# Patient Record
Sex: FEMALE | Race: WHITE | NOT HISPANIC OR LATINO
[De-identification: names, ages, dates, MRNs, and addresses within clinical notes are randomized per-mention and may not be internally consistent; named-entity substitution may affect disease eponyms.]

---

## 2017-12-07 ENCOUNTER — NEW PATIENT (OUTPATIENT)
Age: 77
End: 2017-12-07

## 2017-12-07 DIAGNOSIS — H35.363: ICD-10-CM

## 2017-12-07 DIAGNOSIS — H25.13: ICD-10-CM

## 2017-12-07 PROCEDURE — 99204 OFFICE O/P NEW MOD 45 MIN: CPT

## 2017-12-07 ASSESSMENT — TONOMETRY
OD_IOP_MMHG: 14
OS_IOP_MMHG: 15

## 2017-12-07 ASSESSMENT — KERATOMETRY
OD_K1POWER_DIOPTERS: 43.00
OS_K2POWER_DIOPTERS: 44.75
OD_AXISANGLE2_DEGREES: 173
OS_K1POWER_DIOPTERS: 42.00
OD_K2POWER_DIOPTERS: 45.00
OS_AXISANGLE_DEGREES: 76
OD_AXISANGLE_DEGREES: 83
OS_AXISANGLE2_DEGREES: 166

## 2017-12-07 ASSESSMENT — VISUAL ACUITY
OS_CC: 20/150+2
OD_CC: 20/60
OS_PH: 20/80-1

## 2017-12-22 ENCOUNTER — DIAGNOSTICS ONLY (OUTPATIENT)
Age: 77
End: 2017-12-22

## 2017-12-22 DIAGNOSIS — H25.13: ICD-10-CM

## 2017-12-22 PROCEDURE — 92014 COMPRE OPH EXAM EST PT 1/>: CPT

## 2017-12-22 PROCEDURE — 92136 OPHTHALMIC BIOMETRY: CPT

## 2017-12-22 ASSESSMENT — KERATOMETRY
OD_K2POWER_DIOPTERS: 45.00
OS_K1POWER_DIOPTERS: 42.25
OD_AXISANGLE_DEGREES: 80
OD_AXISANGLE2_DEGREES: 170
OD_K1POWER_DIOPTERS: 43.00
OS_AXISANGLE_DEGREES: 78
OS_K2POWER_DIOPTERS: 44.75
OS_AXISANGLE2_DEGREES: 168

## 2018-01-02 ASSESSMENT — KERATOMETRY
OS_AXISANGLE_DEGREES: 78
OD_K1POWER_DIOPTERS: 43.00
OS_AXISANGLE2_DEGREES: 168
OS_K1POWER_DIOPTERS: 42.25
OD_AXISANGLE_DEGREES: 80
OD_AXISANGLE2_DEGREES: 170
OS_K2POWER_DIOPTERS: 44.75
OD_K2POWER_DIOPTERS: 45.00

## 2018-01-03 ENCOUNTER — SURGERY/PROCEDURE (OUTPATIENT)
Age: 78
End: 2018-01-03

## 2018-01-03 DIAGNOSIS — H25.12: ICD-10-CM

## 2018-01-03 PROCEDURE — V2787 ASTIGMATISM-CORRECT FUNCTION: HCPCS

## 2018-01-03 PROCEDURE — 66984 XCAPSL CTRC RMVL W/O ECP: CPT

## 2018-01-04 ENCOUNTER — POST-OP CHECK (OUTPATIENT)
Age: 78
End: 2018-01-04

## 2018-01-04 DIAGNOSIS — Z96.1: ICD-10-CM

## 2018-01-04 DIAGNOSIS — H25.11: ICD-10-CM

## 2018-01-04 PROCEDURE — 99024 POSTOP FOLLOW-UP VISIT: CPT

## 2018-01-04 PROCEDURE — 92136 OPHTHALMIC BIOMETRY: CPT | Mod: 26,RT

## 2018-01-04 ASSESSMENT — KERATOMETRY
OS_K1POWER_DIOPTERS: 42.25
OD_AXISANGLE2_DEGREES: 170
OD_AXISANGLE_DEGREES: 80
OS_AXISANGLE2_DEGREES: 168
OD_K2POWER_DIOPTERS: 45.00
OS_AXISANGLE_DEGREES: 78
OD_K1POWER_DIOPTERS: 43.00
OS_K2POWER_DIOPTERS: 44.75

## 2018-01-04 ASSESSMENT — VISUAL ACUITY
OS_SC: 20/150-1
OS_SC: J5

## 2018-01-04 ASSESSMENT — TONOMETRY: OS_IOP_MMHG: 22

## 2018-01-12 ENCOUNTER — SURGERY/PROCEDURE (OUTPATIENT)
Age: 78
End: 2018-01-12

## 2018-01-12 ENCOUNTER — POST-OP CHECK (OUTPATIENT)
Age: 78
End: 2018-01-12

## 2018-01-12 DIAGNOSIS — H25.11: ICD-10-CM

## 2018-01-12 DIAGNOSIS — Z96.1: ICD-10-CM

## 2018-01-12 PROCEDURE — 99024 POSTOP FOLLOW-UP VISIT: CPT

## 2018-01-12 PROCEDURE — 66984 XCAPSL CTRC RMVL W/O ECP: CPT | Mod: 79,RT

## 2018-01-12 PROCEDURE — V2787 ASTIGMATISM-CORRECT FUNCTION: HCPCS

## 2018-01-12 ASSESSMENT — KERATOMETRY
OS_K2POWER_DIOPTERS: 44.75
OS_AXISANGLE_DEGREES: 78
OS_AXISANGLE2_DEGREES: 168
OS_K1POWER_DIOPTERS: 42.25
OD_K2POWER_DIOPTERS: 45.00
OD_K2POWER_DIOPTERS: 45.00
OS_K1POWER_DIOPTERS: 42.25
OD_AXISANGLE2_DEGREES: 170
OS_AXISANGLE_DEGREES: 78
OD_AXISANGLE_DEGREES: 80
OD_K1POWER_DIOPTERS: 43.00
OD_AXISANGLE2_DEGREES: 170
OS_K2POWER_DIOPTERS: 44.75
OD_AXISANGLE_DEGREES: 80
OD_K1POWER_DIOPTERS: 43.00
OS_AXISANGLE2_DEGREES: 168

## 2018-01-12 ASSESSMENT — VISUAL ACUITY
OD_SC: J2
OD_SC: 20/60+2
OS_SC: 20/80
OS_SC: J2

## 2018-01-12 ASSESSMENT — TONOMETRY
OD_IOP_MMHG: 40
OS_IOP_MMHG: 12

## 2018-01-26 ENCOUNTER — POST-OP CHECK (OUTPATIENT)
Age: 78
End: 2018-01-26

## 2018-01-26 DIAGNOSIS — Z96.1: ICD-10-CM

## 2018-01-26 PROCEDURE — 99024 POSTOP FOLLOW-UP VISIT: CPT

## 2018-01-26 ASSESSMENT — VISUAL ACUITY
OD_PH: 20/60
OD_SC: J10
OS_SC: 20/80
OD_SC: 20/100+2
OS_PH: 20/30
OS_SC: J2

## 2018-01-26 ASSESSMENT — KERATOMETRY
OS_AXISANGLE2_DEGREES: 160
OD_K1POWER_DIOPTERS: 42.75
OS_AXISANGLE_DEGREES: 70
OD_AXISANGLE_DEGREES: 81
OD_K2POWER_DIOPTERS: 45.25
OS_K1POWER_DIOPTERS: 42.50
OD_AXISANGLE2_DEGREES: 171
OS_K2POWER_DIOPTERS: 44.50

## 2018-01-26 ASSESSMENT — TONOMETRY
OD_IOP_MMHG: 17
OS_IOP_MMHG: 17

## 2018-02-08 ENCOUNTER — POST-OP CHECK (OUTPATIENT)
Age: 78
End: 2018-02-08

## 2018-02-08 DIAGNOSIS — H52.13: ICD-10-CM

## 2018-02-08 DIAGNOSIS — Z96.1: ICD-10-CM

## 2018-02-08 PROCEDURE — 92015 DETERMINE REFRACTIVE STATE: CPT

## 2018-02-08 PROCEDURE — 99024 POSTOP FOLLOW-UP VISIT: CPT

## 2018-02-08 ASSESSMENT — KERATOMETRY
OD_AXISANGLE2_DEGREES: 176
OS_AXISANGLE2_DEGREES: 156
OD_K1POWER_DIOPTERS: 43.00
OS_AXISANGLE_DEGREES: 66
OD_K2POWER_DIOPTERS: 45.00
OS_K1POWER_DIOPTERS: 43.00
OD_AXISANGLE_DEGREES: 86
OS_K2POWER_DIOPTERS: 44.25

## 2018-02-08 ASSESSMENT — VISUAL ACUITY
OD_PH: 20/40
OS_SC: 20/80+2
OS_SC: J1
OD_SC: J1
OD_SC: 20/70+1
OS_PH: 20/40+2

## 2018-02-08 ASSESSMENT — TONOMETRY
OS_IOP_MMHG: 18
OD_IOP_MMHG: 17

## 2018-08-09 ENCOUNTER — ESTABLISHED (OUTPATIENT)
Age: 78
End: 2018-08-09

## 2018-08-09 DIAGNOSIS — H35.363: ICD-10-CM

## 2018-08-09 DIAGNOSIS — Z96.1: ICD-10-CM

## 2018-08-09 PROCEDURE — 92250 FUNDUS PHOTOGRAPHY W/I&R: CPT

## 2018-08-09 PROCEDURE — 92014 COMPRE OPH EXAM EST PT 1/>: CPT

## 2018-08-09 ASSESSMENT — KERATOMETRY
OS_AXISANGLE_DEGREES: 66
OS_AXISANGLE2_DEGREES: 156
OS_K2POWER_DIOPTERS: 44.25
OD_K1POWER_DIOPTERS: 43.00
OD_K2POWER_DIOPTERS: 45.00
OD_AXISANGLE_DEGREES: 86
OD_AXISANGLE2_DEGREES: 176
OS_K1POWER_DIOPTERS: 43.00

## 2018-08-09 ASSESSMENT — VISUAL ACUITY
OD_CC: 20/30+1
OS_CC: 20/30-2

## 2018-08-09 ASSESSMENT — TONOMETRY
OD_IOP_MMHG: 13
OS_IOP_MMHG: 13

## 2019-09-06 ENCOUNTER — DILATED FUNDUS EXAM (OUTPATIENT)
Age: 79
End: 2019-09-06

## 2019-09-06 DIAGNOSIS — Z96.1: ICD-10-CM

## 2019-09-06 DIAGNOSIS — H35.363: ICD-10-CM

## 2019-09-06 DIAGNOSIS — H26.493: ICD-10-CM

## 2019-09-06 PROCEDURE — 92014 COMPRE OPH EXAM EST PT 1/>: CPT

## 2019-09-06 PROCEDURE — 92250 FUNDUS PHOTOGRAPHY W/I&R: CPT

## 2019-09-06 ASSESSMENT — TONOMETRY
OD_IOP_MMHG: 16
OS_IOP_MMHG: 15

## 2019-09-06 ASSESSMENT — VISUAL ACUITY
OS_CC: 20/30
OD_CC: 20/30-2

## 2020-10-20 ENCOUNTER — 1 YEAR COMPLETE EXAM (OUTPATIENT)
Age: 80
End: 2020-10-20

## 2020-10-20 DIAGNOSIS — H26.493: ICD-10-CM

## 2020-10-20 DIAGNOSIS — H35.363: ICD-10-CM

## 2020-10-20 DIAGNOSIS — H43.811: ICD-10-CM

## 2020-10-20 DIAGNOSIS — H04.123: ICD-10-CM

## 2020-10-20 DIAGNOSIS — H18.593: ICD-10-CM

## 2020-10-20 PROCEDURE — 92014 COMPRE OPH EXAM EST PT 1/>: CPT

## 2020-10-20 ASSESSMENT — TONOMETRY
OS_IOP_MMHG: 14
OD_IOP_MMHG: 16

## 2020-10-20 ASSESSMENT — VISUAL ACUITY
OS_CC: 20/40
OD_CC: 20/50+2
OU_CC: 20/30-2

## 2023-03-27 ENCOUNTER — NURSING HOME VISIT (OUTPATIENT)
Dept: POST ACUTE CARE | Facility: EXTERNAL LOCATION | Age: 83
End: 2023-03-27
Payer: COMMERCIAL

## 2023-03-27 DIAGNOSIS — I15.9 SECONDARY HYPERTENSION: ICD-10-CM

## 2023-03-27 DIAGNOSIS — E78.5 HYPERLIPIDEMIA, UNSPECIFIED HYPERLIPIDEMIA TYPE: ICD-10-CM

## 2023-03-27 DIAGNOSIS — M81.0 OSTEOPOROSIS, UNSPECIFIED OSTEOPOROSIS TYPE, UNSPECIFIED PATHOLOGICAL FRACTURE PRESENCE: ICD-10-CM

## 2023-03-27 DIAGNOSIS — G30.9 SEVERE ALZHEIMER'S DEMENTIA WITH MOOD DISTURBANCE, UNSPECIFIED TIMING OF DEMENTIA ONSET (MULTI): ICD-10-CM

## 2023-03-27 DIAGNOSIS — E83.42 HYPOMAGNESEMIA: ICD-10-CM

## 2023-03-27 DIAGNOSIS — F02.C3 SEVERE ALZHEIMER'S DEMENTIA WITH MOOD DISTURBANCE, UNSPECIFIED TIMING OF DEMENTIA ONSET (MULTI): ICD-10-CM

## 2023-03-27 DIAGNOSIS — G40.909 NONINTRACTABLE EPILEPSY WITHOUT STATUS EPILEPTICUS, UNSPECIFIED EPILEPSY TYPE (MULTI): ICD-10-CM

## 2023-03-27 PROCEDURE — 99309 SBSQ NF CARE MODERATE MDM 30: CPT | Performed by: INTERNAL MEDICINE

## 2023-03-27 NOTE — LETTER
Patient: Nay Frost  : 1940    Encounter Date: 2023    Name: Nay Frost  : 1940  MRN: [id number]  Visit Date: 3/27/2023  Chief Complaint: Monthly visit for management of chronic disease    HPI: 83 y/o CF who presented to [facility] due to acute confusion. Patient has a h/o alzheimer's dementia but her confusion was worse. Imaging was nondiagnostic. She was +ve for a UTI. She was treated with antibiotics. Patient improved and she was brought to [facility] for ongoing medical management and therapy services.    Subjective: Seen and examined today. Sitting up in chair in common area. Nonsensical speaking. Nursing reports no issues. I have reviewed nursing notes since my last visit and document any significant changes Reviewed orders, medications, Labs. Reviewed and updated Interval hx and meds reviewed. Reviewed chart looking at current medications, treatments, labs, x-rays etc.     ROS:  As above in subjective. Otherwise, all other systems have been reviewed and are negative for complaint.    Medications:  Medications reviewed and verified in NH chart.     Allergies:  Erythromycin, Pneumococcal Vaccines, Sulfa Antibiotics, Nuts, Bee Pollen, GRENADINE FLAVOR, IODINE, Shellfish, strawberry     Vital Signs: Reviewed in [facility]    Physical Exam:  CONSTITUTIONAL: elderly appearing, malnourished, nonsensical speaking, confused, able to follow simple commands, no distress  SKIN: Warm & Dry, no lesions, no rashes.  EYES: PERRL, EOMI, clear sclera  ENMT: Mucous membranes moist, no apparent injury, no lesions seen   HEAD/NECK: No lymphadenopathy, thyromegaly or JVD.  HEART: Regular rate & rhythm, no murmurs, 2+ equal pulses of the extremities, Normal S1 & S2.  LUNGS: Patent airways, diminished breath sounds with good chest expansion, thorax symmetric  ABDOMEN: Nondistended, soft, non-tender, +BS, no rebound tenderness or guarding.   EXTREMITIES: Normal extremities, no cyanosis, no  edema, no contusions, no clubbing  NEUROLOGIC: intact reflexes, confused     Results/Data:   Lab Results   Component Value Date    WBC 6.4 02/02/2022    HGB 12.8 02/02/2022    HCT 42.1 02/02/2022     (L) 02/02/2022    ALT 25 02/02/2022    AST 28 02/02/2022     (H) 02/02/2022    K 4.5 02/02/2022     (H) 02/02/2022    CREATININE 1.05 02/02/2022    BUN 33 (H) 02/02/2022    CO2 23 02/02/2022    INR 1.1 02/02/2022       Provider Impression:   Seizure like activity felt to be 2/2 UTI  - completed atb during hospitalization  - seizure precautions in place  - saw Neuro, Dr Meade on 7/1/2022 and did not rec medication at this time.   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 Irregular bradycardia noted (44-60bpm).  - nursing reports no seizure activity  - monitor closely, fall precautions    Alzheimer's Vascular Dementia  #mood disorder  - continue with vitamins  - c/w aricept  - consult psych as needed  - supportive care  - secure unit    HTN  - continue with metoprolol  - continue with baby ASA    Insomnia  - continue with trazodone    HLD  - continue with statin    Hypomag  - continue with supplementation    Osteoporosis  - c/w calcium+D    Constipation Prevention  - continue with stool softeners and laxatives PRN    Code Status  - DNRCCA    ----------------  Written by Penny Key RN acting as a scribe for Dr. Bernal. This note accurately reflects the work and decisions made by Dr. Bernal.     I, Dr. Bernal, attest all medical record entries made by the scribe were under my direction and were personally dictated by me. I have reviewed the chart and agree that the record accurately reflects my performance of the history, physical exam, and assessment and plan.       Electronically Signed By: Joesph Mckeon MD   5/10/23  1:32 PM

## 2023-05-04 ENCOUNTER — NURSING HOME VISIT (OUTPATIENT)
Dept: POST ACUTE CARE | Facility: EXTERNAL LOCATION | Age: 83
End: 2023-05-04
Payer: COMMERCIAL

## 2023-05-04 DIAGNOSIS — G30.9 SEVERE ALZHEIMER'S DEMENTIA WITH MOOD DISTURBANCE, UNSPECIFIED TIMING OF DEMENTIA ONSET (MULTI): ICD-10-CM

## 2023-05-04 DIAGNOSIS — I15.9 SECONDARY HYPERTENSION: ICD-10-CM

## 2023-05-04 DIAGNOSIS — F02.C3 SEVERE ALZHEIMER'S DEMENTIA WITH MOOD DISTURBANCE, UNSPECIFIED TIMING OF DEMENTIA ONSET (MULTI): ICD-10-CM

## 2023-05-04 DIAGNOSIS — E83.42 HYPOMAGNESEMIA: ICD-10-CM

## 2023-05-04 DIAGNOSIS — M81.0 OSTEOPOROSIS, UNSPECIFIED OSTEOPOROSIS TYPE, UNSPECIFIED PATHOLOGICAL FRACTURE PRESENCE: ICD-10-CM

## 2023-05-04 DIAGNOSIS — E78.5 HYPERLIPIDEMIA, UNSPECIFIED HYPERLIPIDEMIA TYPE: ICD-10-CM

## 2023-05-04 DIAGNOSIS — G40.909 NONINTRACTABLE EPILEPSY WITHOUT STATUS EPILEPTICUS, UNSPECIFIED EPILEPSY TYPE (MULTI): ICD-10-CM

## 2023-05-04 PROCEDURE — 99308 SBSQ NF CARE LOW MDM 20: CPT | Performed by: INTERNAL MEDICINE

## 2023-05-04 NOTE — LETTER
Patient: Nay Frost  : 1940    Encounter Date: 2023    Name: Nay Frost  : 1940  MRN: [id number]  Visit Date: 2023  Chief Complaint: Monthly visit for management of chronic disease    HPI: 81 y/o CF who presented to [facility] due to acute confusion. Patient has a h/o alzheimer's dementia but her confusion was worse. Imaging was nondiagnostic. She was +ve for a UTI. She was treated with antibiotics. Patient improved and she was brought to [facility] for ongoing medical management and therapy services.    Subjective: Seen and examined today. Sitting up in chair in common area. Nonsensical speaking. Nursing reports no issues. I have reviewed nursing notes since my last visit and document any significant changes Reviewed orders, medications, Labs. Reviewed and updated Interval hx and meds reviewed. Reviewed chart looking at current medications, treatments, labs, x-rays etc.     ROS:  As above in subjective. Otherwise, all other systems have been reviewed and are negative for complaint.    Medications:  Medications reviewed and verified in NH chart.     Allergies:  Erythromycin, Pneumococcal Vaccines, Sulfa Antibiotics, Nuts, Bee Pollen, GRENADINE FLAVOR, IODINE, Shellfish, strawberry     Vital Signs: Reviewed in [facility]    Physical Exam:  CONSTITUTIONAL: elderly appearing, malnourished, nonsensical speaking, confused, able to follow simple commands, no distress  SKIN: Warm & Dry, no lesions, no rashes.  EYES: PERRL, EOMI, clear sclera  ENMT: Mucous membranes moist, no apparent injury, no lesions seen   HEAD/NECK: No lymphadenopathy, thyromegaly or JVD.  HEART: Regular rate & rhythm, no murmurs, 2+ equal pulses of the extremities, Normal S1 & S2.  LUNGS: Patent airways, diminished breath sounds with good chest expansion, thorax symmetric  ABDOMEN: Nondistended, soft, non-tender, +BS, no rebound tenderness or guarding.   EXTREMITIES: Normal extremities, no cyanosis, no  edema, no contusions, no clubbing  NEUROLOGIC: intact reflexes, confused     Results/Data:   Lab Results   Component Value Date    WBC 6.4 02/02/2022    HGB 12.8 02/02/2022    HCT 42.1 02/02/2022     (L) 02/02/2022    ALT 25 02/02/2022    AST 28 02/02/2022     (H) 02/02/2022    K 4.5 02/02/2022     (H) 02/02/2022    CREATININE 1.05 02/02/2022    BUN 33 (H) 02/02/2022    CO2 23 02/02/2022    INR 1.1 02/02/2022       Provider Impression:   Seizure like activity felt to be 2/2 UTI  - completed atb during hospitalization  - seizure precautions in place  - saw Neuro, Dr Meade on 7/1/2022 and did not rec medication at this time.   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 Irregular bradycardia noted (44-60bpm).  - nursing reports no seizure activity  - monitor closely, fall precautions    Alzheimer's Vascular Dementia  #mood disorder  - continue with vitamins  - c/w aricept  - consult psych as needed  - supportive care  - secure unit    HTN  - continue with metoprolol  - continue with baby ASA    Insomnia  - continue with trazodone    HLD  - continue with statin    Hypomag  - continue with supplementation    Osteoporosis  - c/w calcium+D    Constipation Prevention  - continue with stool softeners and laxatives PRN    Code Status  - DNRCCA    ----------------  Written by Penny Key RN acting as a scribe for Dr. Bernal. This note accurately reflects the work and decisions made by Dr. Bernal.     I, Dr. Bernal, attest all medical record entries made by the scribe were under my direction and were personally dictated by me. I have reviewed the chart and agree that the record accurately reflects my performance of the history, physical exam, and assessment and plan.       Electronically Signed By: Joesph Mckeon MD   5/16/23  9:05 AM

## 2023-05-10 PROBLEM — G40.909 NONINTRACTABLE EPILEPSY WITHOUT STATUS EPILEPTICUS (MULTI): Status: ACTIVE | Noted: 2023-05-10

## 2023-05-10 PROBLEM — E83.42 HYPOMAGNESEMIA: Status: ACTIVE | Noted: 2023-05-10

## 2023-05-10 PROBLEM — E78.5 HYPERLIPIDEMIA: Status: ACTIVE | Noted: 2023-05-10

## 2023-05-10 PROBLEM — M81.0 OSTEOPOROSIS: Status: ACTIVE | Noted: 2023-05-10

## 2023-05-10 PROBLEM — I15.9 SECONDARY HYPERTENSION: Status: ACTIVE | Noted: 2023-05-10

## 2023-05-10 PROBLEM — F02.C3: Status: ACTIVE | Noted: 2023-05-10

## 2023-05-10 PROBLEM — G30.9: Status: ACTIVE | Noted: 2023-05-10

## 2023-05-10 NOTE — PROGRESS NOTES
Name: Nay Frost  : 1940  MRN: [id number]  Visit Date: 3/27/2023  Chief Complaint: Monthly visit for management of chronic disease    HPI: 83 y/o CF who presented to [facility] due to acute confusion. Patient has a h/o alzheimer's dementia but her confusion was worse. Imaging was nondiagnostic. She was +ve for a UTI. She was treated with antibiotics. Patient improved and she was brought to [facility] for ongoing medical management and therapy services.    Subjective: Seen and examined today. Sitting up in chair in common area. Nonsensical speaking. Nursing reports no issues. I have reviewed nursing notes since my last visit and document any significant changes Reviewed orders, medications, Labs. Reviewed and updated Interval hx and meds reviewed. Reviewed chart looking at current medications, treatments, labs, x-rays etc.     ROS:  As above in subjective. Otherwise, all other systems have been reviewed and are negative for complaint.    Medications:  Medications reviewed and verified in NH chart.     Allergies:  Erythromycin, Pneumococcal Vaccines, Sulfa Antibiotics, Nuts, Bee Pollen, GRENADINE FLAVOR, IODINE, Shellfish, strawberry     Vital Signs: Reviewed in [facility]    Physical Exam:  CONSTITUTIONAL: elderly appearing, malnourished, nonsensical speaking, confused, able to follow simple commands, no distress  SKIN: Warm & Dry, no lesions, no rashes.  EYES: PERRL, EOMI, clear sclera  ENMT: Mucous membranes moist, no apparent injury, no lesions seen   HEAD/NECK: No lymphadenopathy, thyromegaly or JVD.  HEART: Regular rate & rhythm, no murmurs, 2+ equal pulses of the extremities, Normal S1 & S2.  LUNGS: Patent airways, diminished breath sounds with good chest expansion, thorax symmetric  ABDOMEN: Nondistended, soft, non-tender, +BS, no rebound tenderness or guarding.   EXTREMITIES: Normal extremities, no cyanosis, no edema, no contusions, no clubbing  NEUROLOGIC: intact reflexes, confused      Results/Data:   Lab Results   Component Value Date    WBC 6.4 02/02/2022    HGB 12.8 02/02/2022    HCT 42.1 02/02/2022     (L) 02/02/2022    ALT 25 02/02/2022    AST 28 02/02/2022     (H) 02/02/2022    K 4.5 02/02/2022     (H) 02/02/2022    CREATININE 1.05 02/02/2022    BUN 33 (H) 02/02/2022    CO2 23 02/02/2022    INR 1.1 02/02/2022       Provider Impression:   Seizure like activity felt to be 2/2 UTI  - completed atb during hospitalization  - seizure precautions in place  - saw Neuro, Dr Meade on 7/1/2022 and did not rec medication at this time.   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 Irregular bradycardia noted (44-60bpm).  - nursing reports no seizure activity  - monitor closely, fall precautions    Alzheimer's Vascular Dementia  #mood disorder  - continue with vitamins  - c/w aricept  - consult psych as needed  - supportive care  - secure unit    HTN  - continue with metoprolol  - continue with baby ASA    Insomnia  - continue with trazodone    HLD  - continue with statin    Hypomag  - continue with supplementation    Osteoporosis  - c/w calcium+D    Constipation Prevention  - continue with stool softeners and laxatives PRN    Code Status  - DNRCCA    ----------------  Written by Penny Key RN acting as a scribe for Dr. Bernal. This note accurately reflects the work and decisions made by Dr. Bernal.     I, Dr. Bernal, attest all medical record entries made by the scribe were under my direction and were personally dictated by me. I have reviewed the chart and agree that the record accurately reflects my performance of the history, physical exam, and assessment and plan.

## 2023-05-15 NOTE — PROGRESS NOTES
Name: Nay Frost  : 1940  MRN: [id number]  Visit Date: 2023  Chief Complaint: Monthly visit for management of chronic disease    HPI: 83 y/o CF who presented to [facility] due to acute confusion. Patient has a h/o alzheimer's dementia but her confusion was worse. Imaging was nondiagnostic. She was +ve for a UTI. She was treated with antibiotics. Patient improved and she was brought to [facility] for ongoing medical management and therapy services.    Subjective: Seen and examined today. Sitting up in chair in common area. Nonsensical speaking. Nursing reports no issues. I have reviewed nursing notes since my last visit and document any significant changes Reviewed orders, medications, Labs. Reviewed and updated Interval hx and meds reviewed. Reviewed chart looking at current medications, treatments, labs, x-rays etc.     ROS:  As above in subjective. Otherwise, all other systems have been reviewed and are negative for complaint.    Medications:  Medications reviewed and verified in NH chart.     Allergies:  Erythromycin, Pneumococcal Vaccines, Sulfa Antibiotics, Nuts, Bee Pollen, GRENADINE FLAVOR, IODINE, Shellfish, strawberry     Vital Signs: Reviewed in [facility]    Physical Exam:  CONSTITUTIONAL: elderly appearing, malnourished, nonsensical speaking, confused, able to follow simple commands, no distress  SKIN: Warm & Dry, no lesions, no rashes.  EYES: PERRL, EOMI, clear sclera  ENMT: Mucous membranes moist, no apparent injury, no lesions seen   HEAD/NECK: No lymphadenopathy, thyromegaly or JVD.  HEART: Regular rate & rhythm, no murmurs, 2+ equal pulses of the extremities, Normal S1 & S2.  LUNGS: Patent airways, diminished breath sounds with good chest expansion, thorax symmetric  ABDOMEN: Nondistended, soft, non-tender, +BS, no rebound tenderness or guarding.   EXTREMITIES: Normal extremities, no cyanosis, no edema, no contusions, no clubbing  NEUROLOGIC: intact reflexes, confused      Results/Data:   Lab Results   Component Value Date    WBC 6.4 02/02/2022    HGB 12.8 02/02/2022    HCT 42.1 02/02/2022     (L) 02/02/2022    ALT 25 02/02/2022    AST 28 02/02/2022     (H) 02/02/2022    K 4.5 02/02/2022     (H) 02/02/2022    CREATININE 1.05 02/02/2022    BUN 33 (H) 02/02/2022    CO2 23 02/02/2022    INR 1.1 02/02/2022       Provider Impression:   Seizure like activity felt to be 2/2 UTI  - completed atb during hospitalization  - seizure precautions in place  - saw Neuro, Dr Meade on 7/1/2022 and did not rec medication at this time.   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 Irregular bradycardia noted (44-60bpm).  - nursing reports no seizure activity  - monitor closely, fall precautions    Alzheimer's Vascular Dementia  #mood disorder  - continue with vitamins  - c/w aricept  - consult psych as needed  - supportive care  - secure unit    HTN  - continue with metoprolol  - continue with baby ASA    Insomnia  - continue with trazodone    HLD  - continue with statin    Hypomag  - continue with supplementation    Osteoporosis  - c/w calcium+D    Constipation Prevention  - continue with stool softeners and laxatives PRN    Code Status  - DNRCCA    ----------------  Written by Penny Key RN acting as a scribe for Dr. Bernal. This note accurately reflects the work and decisions made by Dr. Bernal.     I, Dr. Bernal, attest all medical record entries made by the scribe were under my direction and were personally dictated by me. I have reviewed the chart and agree that the record accurately reflects my performance of the history, physical exam, and assessment and plan.

## 2023-07-15 ENCOUNTER — NURSING HOME VISIT (OUTPATIENT)
Dept: POST ACUTE CARE | Facility: EXTERNAL LOCATION | Age: 83
End: 2023-07-15
Payer: COMMERCIAL

## 2023-07-15 DIAGNOSIS — G30.9 SEVERE ALZHEIMER'S DEMENTIA WITH MOOD DISTURBANCE, UNSPECIFIED TIMING OF DEMENTIA ONSET (MULTI): ICD-10-CM

## 2023-07-15 DIAGNOSIS — I15.9 SECONDARY HYPERTENSION: ICD-10-CM

## 2023-07-15 DIAGNOSIS — E78.5 HYPERLIPIDEMIA, UNSPECIFIED HYPERLIPIDEMIA TYPE: ICD-10-CM

## 2023-07-15 DIAGNOSIS — F02.C3 SEVERE ALZHEIMER'S DEMENTIA WITH MOOD DISTURBANCE, UNSPECIFIED TIMING OF DEMENTIA ONSET (MULTI): ICD-10-CM

## 2023-07-15 DIAGNOSIS — M81.0 OSTEOPOROSIS, UNSPECIFIED OSTEOPOROSIS TYPE, UNSPECIFIED PATHOLOGICAL FRACTURE PRESENCE: ICD-10-CM

## 2023-07-15 DIAGNOSIS — G40.909 NONINTRACTABLE EPILEPSY WITHOUT STATUS EPILEPTICUS, UNSPECIFIED EPILEPSY TYPE (MULTI): ICD-10-CM

## 2023-07-15 DIAGNOSIS — E83.42 HYPOMAGNESEMIA: ICD-10-CM

## 2023-07-15 PROCEDURE — 99308 SBSQ NF CARE LOW MDM 20: CPT | Performed by: INTERNAL MEDICINE

## 2023-07-15 NOTE — LETTER
Patient: Nay Frost  : 1940    Encounter Date: 07/15/2023    Name: Nay Frost  : 1940  MRN: [id number]  Visit Date: 7/15/2023  Chief Complaint: Monthly visit for management of chronic disease    HPI: 84 y/o CF who presented to [facility] due to acute confusion. Patient has a h/o alzheimer's dementia but her confusion was worse. Imaging was nondiagnostic. She was +ve for a UTI. She was treated with antibiotics. Patient improved and she was brought to [facility] for ongoing medical management and therapy services.    Subjective: Seen and examined today. Sitting up in chair in common area. Nonsensical speaking. Nursing reports no issues. I have reviewed nursing notes since my last visit and document any significant changes Reviewed orders, medications, Labs. Reviewed and updated Interval hx and meds reviewed. Reviewed chart looking at current medications, treatments, labs, x-rays etc.     ROS:  As above in subjective. Otherwise, all other systems have been reviewed and are negative for complaint.    Medications:  Medications reviewed and verified in NH chart.     Allergies:  Erythromycin, Pneumococcal Vaccines, Sulfa Antibiotics, Nuts, Bee Pollen, GRENADINE FLAVOR, IODINE, Shellfish, strawberry     Vital Signs: Reviewed in [facility]    Physical Exam:  CONSTITUTIONAL: elderly appearing, malnourished, nonsensical speaking, confused, able to follow simple commands, no distress  SKIN: Warm & Dry, no lesions, no rashes.  EYES: PERRL, EOMI, clear sclera  ENMT: Mucous membranes moist, no apparent injury, no lesions seen   HEAD/NECK: No lymphadenopathy, thyromegaly or JVD.  HEART: Regular rate & rhythm, no murmurs, 2+ equal pulses of the extremities, Normal S1 & S2.  LUNGS: Patent airways, diminished breath sounds with good chest expansion, thorax symmetric  ABDOMEN: Nondistended, soft, non-tender, +BS, no rebound tenderness or guarding.   EXTREMITIES: Normal extremities, no cyanosis, no  edema, no contusions, no clubbing  NEUROLOGIC: intact reflexes, confused     Results/Data:   Lab Results   Component Value Date    WBC 6.4 02/02/2022    HGB 12.8 02/02/2022    HCT 42.1 02/02/2022     (L) 02/02/2022    ALT 25 02/02/2022    AST 28 02/02/2022     (H) 02/02/2022    K 4.5 02/02/2022     (H) 02/02/2022    CREATININE 1.05 02/02/2022    BUN 33 (H) 02/02/2022    CO2 23 02/02/2022    INR 1.1 02/02/2022       Provider Impression:   Seizure like activity felt to be 2/2 UTI  - completed atb during hospitalization  - seizure precautions in place  - saw Neuro, Dr Meade on 7/1/2022 and did not rec medication at this time.   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 Irregular bradycardia noted (44-60bpm).  - nursing reports no seizure activity  - monitor closely, fall precautions    Alzheimer's Vascular Dementia  #mood disorder  - continue with vitamins  - c/w aricept  - consult psych as needed  - supportive care  - secure unit    HTN  - continue with metoprolol  - continue with baby ASA    Insomnia  - continue with trazodone    HLD  - continue with statin    Hypomag  - continue with supplementation    Osteoporosis  - c/w calcium+D    Constipation Prevention  - continue with stool softeners and laxatives PRN    Code Status  - DNRCCA    ----------------  Written by Penny Key RN acting as a scribe for Dr. Bernal. This note accurately reflects the work and decisions made by Dr. Bernal.     I, Dr. Bernla, attest all medical record entries made by the scribe were under my direction and were personally dictated by me. I have reviewed the chart and agree that the record accurately reflects my performance of the history, physical exam, and assessment and plan.     Electronically Signed By: Joesph Mckeon MD   9/6/23  3:53 PM

## 2023-09-06 NOTE — PROGRESS NOTES
Name: Nay Frost  : 1940  MRN: [id number]  Visit Date: 7/15/2023  Chief Complaint: Monthly visit for management of chronic disease    HPI: 84 y/o CF who presented to [facility] due to acute confusion. Patient has a h/o alzheimer's dementia but her confusion was worse. Imaging was nondiagnostic. She was +ve for a UTI. She was treated with antibiotics. Patient improved and she was brought to [facility] for ongoing medical management and therapy services.    Subjective: Seen and examined today. Sitting up in chair in common area. Nonsensical speaking. Nursing reports no issues. I have reviewed nursing notes since my last visit and document any significant changes Reviewed orders, medications, Labs. Reviewed and updated Interval hx and meds reviewed. Reviewed chart looking at current medications, treatments, labs, x-rays etc.     ROS:  As above in subjective. Otherwise, all other systems have been reviewed and are negative for complaint.    Medications:  Medications reviewed and verified in NH chart.     Allergies:  Erythromycin, Pneumococcal Vaccines, Sulfa Antibiotics, Nuts, Bee Pollen, GRENADINE FLAVOR, IODINE, Shellfish, strawberry     Vital Signs: Reviewed in [facility]    Physical Exam:  CONSTITUTIONAL: elderly appearing, malnourished, nonsensical speaking, confused, able to follow simple commands, no distress  SKIN: Warm & Dry, no lesions, no rashes.  EYES: PERRL, EOMI, clear sclera  ENMT: Mucous membranes moist, no apparent injury, no lesions seen   HEAD/NECK: No lymphadenopathy, thyromegaly or JVD.  HEART: Regular rate & rhythm, no murmurs, 2+ equal pulses of the extremities, Normal S1 & S2.  LUNGS: Patent airways, diminished breath sounds with good chest expansion, thorax symmetric  ABDOMEN: Nondistended, soft, non-tender, +BS, no rebound tenderness or guarding.   EXTREMITIES: Normal extremities, no cyanosis, no edema, no contusions, no clubbing  NEUROLOGIC: intact reflexes, confused      Results/Data:   Lab Results   Component Value Date    WBC 6.4 02/02/2022    HGB 12.8 02/02/2022    HCT 42.1 02/02/2022     (L) 02/02/2022    ALT 25 02/02/2022    AST 28 02/02/2022     (H) 02/02/2022    K 4.5 02/02/2022     (H) 02/02/2022    CREATININE 1.05 02/02/2022    BUN 33 (H) 02/02/2022    CO2 23 02/02/2022    INR 1.1 02/02/2022       Provider Impression:   Seizure like activity felt to be 2/2 UTI  - completed atb during hospitalization  - seizure precautions in place  - saw Neuro, Dr Meade on 7/1/2022 and did not rec medication at this time.   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 Irregular bradycardia noted (44-60bpm).  - nursing reports no seizure activity  - monitor closely, fall precautions    Alzheimer's Vascular Dementia  #mood disorder  - continue with vitamins  - c/w aricept  - consult psych as needed  - supportive care  - secure unit    HTN  - continue with metoprolol  - continue with baby ASA    Insomnia  - continue with trazodone    HLD  - continue with statin    Hypomag  - continue with supplementation    Osteoporosis  - c/w calcium+D    Constipation Prevention  - continue with stool softeners and laxatives PRN    Code Status  - DNRCCA    ----------------  Written by Penny Key RN acting as a scribe for Dr. Bernal. This note accurately reflects the work and decisions made by Dr. Bernal.     I, Dr. Bernal, attest all medical record entries made by the scribe were under my direction and were personally dictated by me. I have reviewed the chart and agree that the record accurately reflects my performance of the history, physical exam, and assessment and plan.

## 2023-09-09 ENCOUNTER — NURSING HOME VISIT (OUTPATIENT)
Dept: POST ACUTE CARE | Facility: EXTERNAL LOCATION | Age: 83
End: 2023-09-09
Payer: COMMERCIAL

## 2023-09-09 DIAGNOSIS — E78.5 HYPERLIPIDEMIA, UNSPECIFIED HYPERLIPIDEMIA TYPE: ICD-10-CM

## 2023-09-09 DIAGNOSIS — E83.42 HYPOMAGNESEMIA: ICD-10-CM

## 2023-09-09 DIAGNOSIS — F02.C3 SEVERE ALZHEIMER'S DEMENTIA WITH MOOD DISTURBANCE, UNSPECIFIED TIMING OF DEMENTIA ONSET (MULTI): ICD-10-CM

## 2023-09-09 DIAGNOSIS — G30.9 SEVERE ALZHEIMER'S DEMENTIA WITH MOOD DISTURBANCE, UNSPECIFIED TIMING OF DEMENTIA ONSET (MULTI): ICD-10-CM

## 2023-09-09 DIAGNOSIS — M81.0 OSTEOPOROSIS, UNSPECIFIED OSTEOPOROSIS TYPE, UNSPECIFIED PATHOLOGICAL FRACTURE PRESENCE: ICD-10-CM

## 2023-09-09 DIAGNOSIS — I15.9 SECONDARY HYPERTENSION: ICD-10-CM

## 2023-09-09 DIAGNOSIS — G40.909 NONINTRACTABLE EPILEPSY WITHOUT STATUS EPILEPTICUS, UNSPECIFIED EPILEPSY TYPE (MULTI): ICD-10-CM

## 2023-09-09 PROCEDURE — 99308 SBSQ NF CARE LOW MDM 20: CPT | Performed by: INTERNAL MEDICINE

## 2023-09-09 NOTE — LETTER
Patient: Nay Frost  : 1940    Encounter Date: 2023    Name: Nay Frost  : 1940  MRN: [id number]  Visit Date: 2023  Chief Complaint: Monthly visit for management of chronic disease    HPI: 82 y/o CF who presented to [facility] due to acute confusion. Patient has a h/o alzheimer's dementia but her confusion was worse. Imaging was nondiagnostic. She was +ve for a UTI. She was treated with antibiotics. Patient improved and she was brought to [facility] for ongoing medical management and therapy services.    Subjective: Seen and examined today. Sitting up in chair in common area. Nonsensical speaking. Nursing reports no issues. I have reviewed nursing notes since my last visit and document any significant changes Reviewed orders, medications, Labs. Reviewed and updated Interval hx and meds reviewed. Reviewed chart looking at current medications, treatments, labs, x-rays etc.     ROS:  As above in subjective. Otherwise, all other systems have been reviewed and are negative for complaint.    Medications:  Medications reviewed and verified in NH chart.     Vital Signs: Reviewed in [facility]    Physical Exam:  CONSTITUTIONAL: elderly appearing, malnourished, nonsensical speaking, confused, able to follow simple commands, no distress  SKIN: Warm & Dry, no lesions, no rashes.  EYES: PERRL, EOMI, clear sclera  ENMT: Mucous membranes moist, no apparent injury, no lesions seen   HEAD/NECK: No lymphadenopathy, thyromegaly or JVD.  HEART: Regular rate & rhythm, no murmurs, 2+ equal pulses of the extremities, Normal S1 & S2.  LUNGS: Patent airways, diminished breath sounds with good chest expansion, thorax symmetric  ABDOMEN: Nondistended, soft, non-tender, +BS, no rebound tenderness or guarding.   EXTREMITIES: Normal extremities, no cyanosis, no edema, no contusions, no clubbing  NEUROLOGIC: intact reflexes, confused     Results/Data:   Lab Results   Component Value Date    WBC 7.9  12/07/2023    HGB 9.4 (L) 12/07/2023    HCT 30.5 (L) 12/07/2023    PLT 63 (L) 12/07/2023    ALT 26 12/04/2023    AST 28 12/04/2023     (H) 12/07/2023    K 4.0 12/07/2023     (H) 12/07/2023    CREATININE 1.01 12/07/2023    BUN 36 (H) 12/07/2023    CO2 23 12/07/2023    TSH 2.25 12/03/2023    INR 1.4 (H) 12/04/2023    HGBA1C 5.4 12/04/2023     Provider Impression:   Seizure like activity felt to be 2/2 UTI  - completed atb during hospitalization  - seizure precautions in place  - saw Neuro, Dr Meade on 7/1/2022 and did not rec medication at this time.   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 Irregular bradycardia noted (44-60bpm).  - nursing reports no seizure activity  - monitor closely, fall precautions    Alzheimer's Vascular Dementia  #mood disorder  - continue with vitamins  - c/w aricept  - consult psych as needed  - supportive care  - secure unit    HTN  - continue with metoprolol  - continue with baby ASA    Insomnia  - continue with trazodone    HLD  - continue with statin    Hypomag  - continue with supplementation    Osteoporosis  - c/w calcium+D    Constipation Prevention  - continue with stool softeners and laxatives PRN    Code Status  - DNRCCA    ----------------  Written by Penny Key RN acting as a scribe for Dr. Bernal. This note accurately reflects the work and decisions made by Dr. Bernal.     I, Dr. Bernal, attest all medical record entries made by the scribe were under my direction and were personally dictated by me. I have reviewed the chart and agree that the record accurately reflects my performance of the history, physical exam, and assessment and plan.     Electronically Signed By: Joesph Mckeon MD   1/5/24 12:59 PM

## 2023-10-09 ENCOUNTER — HOSPITAL ENCOUNTER (INPATIENT)
Facility: HOSPITAL | Age: 83
LOS: 3 days | Discharge: NURSING, REHAB OR LONG-TERM CARE | DRG: 522 | End: 2023-10-13
Attending: STUDENT IN AN ORGANIZED HEALTH CARE EDUCATION/TRAINING PROGRAM | Admitting: INTERNAL MEDICINE
Payer: COMMERCIAL

## 2023-10-09 ENCOUNTER — APPOINTMENT (OUTPATIENT)
Dept: RADIOLOGY | Facility: HOSPITAL | Age: 83
DRG: 522 | End: 2023-10-09
Payer: COMMERCIAL

## 2023-10-09 DIAGNOSIS — S72.001A CLOSED RIGHT HIP FRACTURE, INITIAL ENCOUNTER (MULTI): Primary | ICD-10-CM

## 2023-10-09 DIAGNOSIS — S72.91XA CLOSED FRACTURE OF RIGHT FEMUR, UNSPECIFIED FRACTURE MORPHOLOGY, UNSPECIFIED PORTION OF FEMUR, INITIAL ENCOUNTER (MULTI): ICD-10-CM

## 2023-10-09 DIAGNOSIS — T18.9XXA FOREIGN BODY IN DIGESTIVE TRACT, INITIAL ENCOUNTER: ICD-10-CM

## 2023-10-09 LAB
ALBUMIN SERPL BCP-MCNC: 3.9 G/DL (ref 3.4–5)
ALP SERPL-CCNC: 93 U/L (ref 33–136)
ALT SERPL W P-5'-P-CCNC: 27 U/L (ref 7–45)
ANION GAP SERPL CALC-SCNC: 13 MMOL/L (ref 10–20)
AST SERPL W P-5'-P-CCNC: 30 U/L (ref 9–39)
BASOPHILS # BLD AUTO: 0.02 X10*3/UL (ref 0–0.1)
BASOPHILS NFR BLD AUTO: 0.2 %
BILIRUB SERPL-MCNC: 0.9 MG/DL (ref 0–1.2)
BUN SERPL-MCNC: 42 MG/DL (ref 6–23)
CALCIUM SERPL-MCNC: 9.9 MG/DL (ref 8.6–10.3)
CHLORIDE SERPL-SCNC: 109 MMOL/L (ref 98–107)
CO2 SERPL-SCNC: 32 MMOL/L (ref 21–32)
CREAT SERPL-MCNC: 1.11 MG/DL (ref 0.5–1.05)
EOSINOPHIL # BLD AUTO: 0.01 X10*3/UL (ref 0–0.4)
EOSINOPHIL NFR BLD AUTO: 0.1 %
ERYTHROCYTE [DISTWIDTH] IN BLOOD BY AUTOMATED COUNT: 14.1 % (ref 11.5–14.5)
GFR SERPL CREATININE-BSD FRML MDRD: 49 ML/MIN/1.73M*2
GLUCOSE SERPL-MCNC: 122 MG/DL (ref 74–99)
HCT VFR BLD AUTO: 40.1 % (ref 36–46)
HGB BLD-MCNC: 12.6 G/DL (ref 12–16)
IMM GRANULOCYTES # BLD AUTO: 0.04 X10*3/UL (ref 0–0.5)
IMM GRANULOCYTES NFR BLD AUTO: 0.4 % (ref 0–0.9)
INR PPP: 1.2 (ref 0.9–1.1)
LYMPHOCYTES # BLD AUTO: 0.59 X10*3/UL (ref 0.8–3)
LYMPHOCYTES NFR BLD AUTO: 5.9 %
MCH RBC QN AUTO: 30.7 PG (ref 26–34)
MCHC RBC AUTO-ENTMCNC: 31.4 G/DL (ref 32–36)
MCV RBC AUTO: 98 FL (ref 80–100)
MONOCYTES # BLD AUTO: 0.36 X10*3/UL (ref 0.05–0.8)
MONOCYTES NFR BLD AUTO: 3.6 %
NEUTROPHILS # BLD AUTO: 8.93 X10*3/UL (ref 1.6–5.5)
NEUTROPHILS NFR BLD AUTO: 89.8 %
NRBC BLD-RTO: 0 /100 WBCS (ref 0–0)
PLATELET # BLD AUTO: 117 X10*3/UL (ref 150–450)
PMV BLD AUTO: 12.5 FL (ref 7.5–11.5)
POTASSIUM SERPL-SCNC: 4.1 MMOL/L (ref 3.5–5.3)
PROT SERPL-MCNC: 6.8 G/DL (ref 6.4–8.2)
PROTHROMBIN TIME: 13.4 SECONDS (ref 9.8–12.8)
RBC # BLD AUTO: 4.1 X10*6/UL (ref 4–5.2)
SODIUM SERPL-SCNC: 150 MMOL/L (ref 136–145)
WBC # BLD AUTO: 10 X10*3/UL (ref 4.4–11.3)

## 2023-10-09 PROCEDURE — 72170 X-RAY EXAM OF PELVIS: CPT | Mod: FR

## 2023-10-09 PROCEDURE — 86900 BLOOD TYPING SEROLOGIC ABO: CPT | Performed by: STUDENT IN AN ORGANIZED HEALTH CARE EDUCATION/TRAINING PROGRAM

## 2023-10-09 PROCEDURE — 72125 CT NECK SPINE W/O DYE: CPT | Performed by: RADIOLOGY

## 2023-10-09 PROCEDURE — 70450 CT HEAD/BRAIN W/O DYE: CPT | Performed by: RADIOLOGY

## 2023-10-09 PROCEDURE — 85025 COMPLETE CBC W/AUTO DIFF WBC: CPT | Performed by: STUDENT IN AN ORGANIZED HEALTH CARE EDUCATION/TRAINING PROGRAM

## 2023-10-09 PROCEDURE — 96360 HYDRATION IV INFUSION INIT: CPT

## 2023-10-09 PROCEDURE — 36415 COLL VENOUS BLD VENIPUNCTURE: CPT | Performed by: STUDENT IN AN ORGANIZED HEALTH CARE EDUCATION/TRAINING PROGRAM

## 2023-10-09 PROCEDURE — 72125 CT NECK SPINE W/O DYE: CPT | Mod: ME

## 2023-10-09 PROCEDURE — 72170 X-RAY EXAM OF PELVIS: CPT | Mod: FOREIGN READ | Performed by: RADIOLOGY

## 2023-10-09 PROCEDURE — 70450 CT HEAD/BRAIN W/O DYE: CPT | Mod: MG

## 2023-10-09 PROCEDURE — 80053 COMPREHEN METABOLIC PANEL: CPT | Performed by: STUDENT IN AN ORGANIZED HEALTH CARE EDUCATION/TRAINING PROGRAM

## 2023-10-09 PROCEDURE — 71045 X-RAY EXAM CHEST 1 VIEW: CPT | Mod: FY,FR

## 2023-10-09 PROCEDURE — 73560 X-RAY EXAM OF KNEE 1 OR 2: CPT | Mod: 50,FR

## 2023-10-09 PROCEDURE — 85610 PROTHROMBIN TIME: CPT | Performed by: STUDENT IN AN ORGANIZED HEALTH CARE EDUCATION/TRAINING PROGRAM

## 2023-10-09 PROCEDURE — 99285 EMERGENCY DEPT VISIT HI MDM: CPT | Performed by: STUDENT IN AN ORGANIZED HEALTH CARE EDUCATION/TRAINING PROGRAM

## 2023-10-09 PROCEDURE — 73560 X-RAY EXAM OF KNEE 1 OR 2: CPT | Mod: BILATERAL PROCEDURE | Performed by: RADIOLOGY

## 2023-10-09 PROCEDURE — 73552 X-RAY EXAM OF FEMUR 2/>: CPT | Mod: RT,FY,FR

## 2023-10-09 PROCEDURE — 73552 X-RAY EXAM OF FEMUR 2/>: CPT | Mod: RIGHT SIDE | Performed by: RADIOLOGY

## 2023-10-09 PROCEDURE — 71045 X-RAY EXAM CHEST 1 VIEW: CPT | Mod: FOREIGN READ | Performed by: RADIOLOGY

## 2023-10-09 ASSESSMENT — COLUMBIA-SUICIDE SEVERITY RATING SCALE - C-SSRS
1. IN THE PAST MONTH, HAVE YOU WISHED YOU WERE DEAD OR WISHED YOU COULD GO TO SLEEP AND NOT WAKE UP?: NO
1. IN THE PAST MONTH, HAVE YOU WISHED YOU WERE DEAD OR WISHED YOU COULD GO TO SLEEP AND NOT WAKE UP?: NO
6. HAVE YOU EVER DONE ANYTHING, STARTED TO DO ANYTHING, OR PREPARED TO DO ANYTHING TO END YOUR LIFE?: NO
2. HAVE YOU ACTUALLY HAD ANY THOUGHTS OF KILLING YOURSELF?: NO
6. HAVE YOU EVER DONE ANYTHING, STARTED TO DO ANYTHING, OR PREPARED TO DO ANYTHING TO END YOUR LIFE?: NO
2. HAVE YOU ACTUALLY HAD ANY THOUGHTS OF KILLING YOURSELF?: NO

## 2023-10-09 ASSESSMENT — LIFESTYLE VARIABLES
EVER HAD A DRINK FIRST THING IN THE MORNING TO STEADY YOUR NERVES TO GET RID OF A HANGOVER: NO
HAVE YOU EVER FELT YOU SHOULD CUT DOWN ON YOUR DRINKING: NO
HAVE PEOPLE ANNOYED YOU BY CRITICIZING YOUR DRINKING: NO
EVER FELT BAD OR GUILTY ABOUT YOUR DRINKING: NO

## 2023-10-09 ASSESSMENT — PAIN DESCRIPTION - DESCRIPTORS: DESCRIPTORS: THROBBING;ACHING

## 2023-10-09 ASSESSMENT — PAIN - FUNCTIONAL ASSESSMENT: PAIN_FUNCTIONAL_ASSESSMENT: FLACC (FACE, LEGS, ACTIVITY, CRY, CONSOLABILITY)

## 2023-10-09 NOTE — ED PROVIDER NOTES
HPI   Chief Complaint   Patient presents with    Fall     Unwitnessed fall c/o right hip pain. Pt Alert to self which is baseline    Knee Pain     PT presents to the ER with right knee pain. Pt states that her right knee has been sore for x2 weeks, today she felt a popping sensation and is unable to bare any weight on the knee or bend it without excruciating pain. PT states that over a year ago she fell on her knee and it hasn't been the same since. Msps are intact.        Is an 83-year-old female with significant PMH of dementia presents to the ED with cc of unwitnessed fall times today.  Patient is from nursing home and is endorsing pain in right lower extremity.  ROS is limited due to dementia.  Patient is denying any symptoms for me.  Patient is not on any blood thinners.  Patient normally uses a wheelchair and is assisted per nursing report.      History provided by:  Medical records and nursing home                      Grulla Coma Scale Score: 13                  Patient History   History reviewed. No pertinent past medical history.  History reviewed. No pertinent surgical history.  No family history on file.  Social History     Tobacco Use    Smoking status: Unknown    Smokeless tobacco: Not on file   Substance Use Topics    Alcohol use: Not on file    Drug use: Not on file       Physical Exam   ED Triage Vitals [10/09/23 1824]   Temp Heart Rate Resp BP   36.7 °C (98.1 °F) 64 18 146/74      SpO2 Temp src Heart Rate Source Patient Position   97 % -- -- --      BP Location FiO2 (%)     -- --       Physical Exam  HENT:      Head: Normocephalic.   Eyes:      Extraocular Movements: Extraocular movements intact.      Pupils: Pupils are equal, round, and reactive to light.   Cardiovascular:      Rate and Rhythm: Normal rate and regular rhythm.      Pulses: Normal pulses.      Heart sounds: Normal heart sounds.   Pulmonary:      Effort: Pulmonary effort is normal.      Breath sounds: Normal breath sounds.    Abdominal:      Palpations: Abdomen is soft.      Tenderness: There is no abdominal tenderness. There is no guarding.   Musculoskeletal:         General: Tenderness present. No deformity.      Cervical back: Normal range of motion. No tenderness.      Comments: Motion decreased in the right lower extremity with internal/external rotation of the right hip and flexion and extension of the right hip and right knee.  Pain on palpation to the right hip.  No pain on palpation to the right knee.  No pain on palpation elsewhere on exam.   Skin:     General: Skin is warm.   Neurological:      Mental Status: She is alert. Mental status is at baseline.         ED Course & MDM   Diagnoses as of 10/15/23 1141   Closed fracture of right femur, unspecified fracture morphology, unspecified portion of femur, initial encounter (CMS/[facility])       Medical Decision Making  Medical Decision Making:  Patient presented as described in HPI. Patient case including ROS, PE, and treatment and plan discussed with ED attending if attached as cosigner. Due to patients presentation orders completed include as documented.  Patient to the ER with cc of unwitnessed fall times today.  Patient is from nursing home and is ANO x0.  This is patient's baseline.  Patient has a history of dementia.  Patient was found to have fallen today and it was unwitnessed.  No known loss of consciousness no known blood thinners.  Patient normally uses a wheelchair.  Patient has pain on palpation to the right hip, nontoxic-appearing, no obvious focal deficits, decreased range of motion in the right hip with external/internal rotation and flexion and extension.  Patient has decreased range of motion of the right knee.  Patient has no pain on palpation to the right knee or elsewhere on the extremities full range of motion of the upper extremities and neck.  Patient has no mid spinous or paraspinous tenderness.  Pending imaging.  Patient's care will be continued by ER  attending and will be dispo once imaging returns.    This note has been transcribed using voice recognition and may contain grammatical errors, misplaced words, incorrect words, incorrect phrases or other errors.          Procedure  Procedures     Joan Clayton PA-C  10/09/23 1901       Joan Clayton PA-C  10/09/23 1953       Joan Clayton PA-C  10/15/23 1141

## 2023-10-10 ENCOUNTER — ANESTHESIA EVENT (OUTPATIENT)
Dept: OPERATING ROOM | Facility: HOSPITAL | Age: 83
DRG: 522 | End: 2023-10-10
Payer: COMMERCIAL

## 2023-10-10 ENCOUNTER — APPOINTMENT (OUTPATIENT)
Dept: RADIOLOGY | Facility: HOSPITAL | Age: 83
DRG: 522 | End: 2023-10-10
Payer: COMMERCIAL

## 2023-10-10 ENCOUNTER — ANESTHESIA (OUTPATIENT)
Dept: OPERATING ROOM | Facility: HOSPITAL | Age: 83
DRG: 522 | End: 2023-10-10
Payer: COMMERCIAL

## 2023-10-10 PROBLEM — T18.9XXA FOREIGN BODY IN ALIMENTARY TRACT: Status: ACTIVE | Noted: 2023-10-09

## 2023-10-10 LAB
ABO GROUP (TYPE) IN BLOOD: NORMAL
ABO GROUP (TYPE) IN BLOOD: NORMAL
ANION GAP SERPL CALC-SCNC: 14 MMOL/L (ref 10–20)
ANTIBODY SCREEN: NORMAL
BUN SERPL-MCNC: 38 MG/DL (ref 6–23)
CALCIUM SERPL-MCNC: 9.2 MG/DL (ref 8.6–10.3)
CHLORIDE SERPL-SCNC: 111 MMOL/L (ref 98–107)
CO2 SERPL-SCNC: 27 MMOL/L (ref 21–32)
CREAT SERPL-MCNC: 1 MG/DL (ref 0.5–1.05)
ERYTHROCYTE [DISTWIDTH] IN BLOOD BY AUTOMATED COUNT: 14 % (ref 11.5–14.5)
GFR SERPL CREATININE-BSD FRML MDRD: 55 ML/MIN/1.73M*2
GLUCOSE SERPL-MCNC: 128 MG/DL (ref 74–99)
HCT VFR BLD AUTO: 37.1 % (ref 36–46)
HGB BLD-MCNC: 11.6 G/DL (ref 12–16)
MCH RBC QN AUTO: 30.8 PG (ref 26–34)
MCHC RBC AUTO-ENTMCNC: 31.3 G/DL (ref 32–36)
MCV RBC AUTO: 98 FL (ref 80–100)
NRBC BLD-RTO: 0 /100 WBCS (ref 0–0)
PLATELET # BLD AUTO: 104 X10*3/UL (ref 150–450)
PMV BLD AUTO: 11.8 FL (ref 7.5–11.5)
POTASSIUM SERPL-SCNC: 3.9 MMOL/L (ref 3.5–5.3)
RBC # BLD AUTO: 3.77 X10*6/UL (ref 4–5.2)
RH FACTOR (ANTIGEN D): NORMAL
RH FACTOR (ANTIGEN D): NORMAL
SODIUM SERPL-SCNC: 148 MMOL/L (ref 136–145)
WBC # BLD AUTO: 8.4 X10*3/UL (ref 4.4–11.3)

## 2023-10-10 PROCEDURE — 1100000001 HC PRIVATE ROOM DAILY

## 2023-10-10 PROCEDURE — 36415 COLL VENOUS BLD VENIPUNCTURE: CPT | Performed by: INTERNAL MEDICINE

## 2023-10-10 PROCEDURE — 36415 COLL VENOUS BLD VENIPUNCTURE: CPT | Performed by: ORTHOPAEDIC SURGERY

## 2023-10-10 PROCEDURE — 80048 BASIC METABOLIC PNL TOTAL CA: CPT | Performed by: INTERNAL MEDICINE

## 2023-10-10 PROCEDURE — 99231 SBSQ HOSP IP/OBS SF/LOW 25: CPT | Performed by: NURSE PRACTITIONER

## 2023-10-10 PROCEDURE — 99223 1ST HOSP IP/OBS HIGH 75: CPT | Performed by: INTERNAL MEDICINE

## 2023-10-10 PROCEDURE — 99232 SBSQ HOSP IP/OBS MODERATE 35: CPT | Performed by: NURSE PRACTITIONER

## 2023-10-10 PROCEDURE — 27230 TREAT THIGH FRACTURE: CPT | Performed by: NURSE PRACTITIONER

## 2023-10-10 PROCEDURE — 74019 RADEX ABDOMEN 2 VIEWS: CPT | Performed by: RADIOLOGY

## 2023-10-10 PROCEDURE — 85027 COMPLETE CBC AUTOMATED: CPT | Performed by: INTERNAL MEDICINE

## 2023-10-10 PROCEDURE — 74019 RADEX ABDOMEN 2 VIEWS: CPT

## 2023-10-10 PROCEDURE — 2500000001 HC RX 250 WO HCPCS SELF ADMINISTERED DRUGS (ALT 637 FOR MEDICARE OP): Performed by: INTERNAL MEDICINE

## 2023-10-10 PROCEDURE — 2580000001 HC RX 258 IV SOLUTIONS: Performed by: STUDENT IN AN ORGANIZED HEALTH CARE EDUCATION/TRAINING PROGRAM

## 2023-10-10 PROCEDURE — 2500000004 HC RX 250 GENERAL PHARMACY W/ HCPCS (ALT 636 FOR OP/ED): Performed by: INTERNAL MEDICINE

## 2023-10-10 PROCEDURE — 99221 1ST HOSP IP/OBS SF/LOW 40: CPT | Performed by: NURSE PRACTITIONER

## 2023-10-10 RX ORDER — MAGNESIUM 250 MG
250 TABLET ORAL DAILY
COMMUNITY
End: 2023-12-09 | Stop reason: HOSPADM

## 2023-10-10 RX ORDER — GUAIFENESIN 600 MG/1
600 TABLET, EXTENDED RELEASE ORAL EVERY 12 HOURS PRN
Status: DISCONTINUED | OUTPATIENT
Start: 2023-10-10 | End: 2023-10-13 | Stop reason: HOSPADM

## 2023-10-10 RX ORDER — SENNOSIDES 8.6 MG/1
1 TABLET ORAL DAILY
COMMUNITY
End: 2023-12-09 | Stop reason: HOSPADM

## 2023-10-10 RX ORDER — ONDANSETRON HYDROCHLORIDE 2 MG/ML
4 INJECTION, SOLUTION INTRAVENOUS EVERY 8 HOURS PRN
Status: DISCONTINUED | OUTPATIENT
Start: 2023-10-10 | End: 2023-10-13 | Stop reason: HOSPADM

## 2023-10-10 RX ORDER — ACETAMINOPHEN 160 MG/5ML
650 SOLUTION ORAL EVERY 4 HOURS PRN
Status: DISCONTINUED | OUTPATIENT
Start: 2023-10-10 | End: 2023-10-13 | Stop reason: HOSPADM

## 2023-10-10 RX ORDER — ACETAMINOPHEN 325 MG/1
650 TABLET ORAL EVERY 4 HOURS PRN
Status: DISCONTINUED | OUTPATIENT
Start: 2023-10-10 | End: 2023-10-13 | Stop reason: HOSPADM

## 2023-10-10 RX ORDER — TRAZODONE HYDROCHLORIDE 50 MG/1
25 TABLET ORAL NIGHTLY
Status: DISCONTINUED | OUTPATIENT
Start: 2023-10-10 | End: 2023-10-13 | Stop reason: HOSPADM

## 2023-10-10 RX ORDER — LANOLIN ALCOHOL/MO/W.PET/CERES
100 CREAM (GRAM) TOPICAL DAILY
COMMUNITY
End: 2023-12-09 | Stop reason: HOSPADM

## 2023-10-10 RX ORDER — DOCUSATE SODIUM 100 MG/1
100 CAPSULE, LIQUID FILLED ORAL 2 TIMES DAILY
Status: DISCONTINUED | OUTPATIENT
Start: 2023-10-10 | End: 2023-10-13 | Stop reason: HOSPADM

## 2023-10-10 RX ORDER — ATORVASTATIN CALCIUM 40 MG/1
40 TABLET, FILM COATED ORAL DAILY
COMMUNITY
End: 2023-12-09 | Stop reason: HOSPADM

## 2023-10-10 RX ORDER — ACETAMINOPHEN 10 MG/ML
1000 INJECTION, SOLUTION INTRAVENOUS EVERY 6 HOURS SCHEDULED
Status: DISCONTINUED | OUTPATIENT
Start: 2023-10-10 | End: 2023-10-10

## 2023-10-10 RX ORDER — GUAIFENESIN/DEXTROMETHORPHAN 100-10MG/5
5 SYRUP ORAL EVERY 4 HOURS PRN
Status: DISCONTINUED | OUTPATIENT
Start: 2023-10-10 | End: 2023-10-13 | Stop reason: HOSPADM

## 2023-10-10 RX ORDER — LANOLIN ALCOHOL/MO/W.PET/CERES
100 CREAM (GRAM) TOPICAL DAILY
Status: DISCONTINUED | OUTPATIENT
Start: 2023-10-10 | End: 2023-10-13 | Stop reason: HOSPADM

## 2023-10-10 RX ORDER — MORPHINE SULFATE 2 MG/ML
1 INJECTION, SOLUTION INTRAMUSCULAR; INTRAVENOUS EVERY 4 HOURS PRN
Status: DISCONTINUED | OUTPATIENT
Start: 2023-10-10 | End: 2023-10-10

## 2023-10-10 RX ORDER — TRAZODONE HYDROCHLORIDE 50 MG/1
25 TABLET ORAL NIGHTLY
COMMUNITY
End: 2023-12-09 | Stop reason: HOSPADM

## 2023-10-10 RX ORDER — ASPIRIN 325 MG
100 TABLET, DELAYED RELEASE (ENTERIC COATED) ORAL DAILY
Status: ON HOLD | COMMUNITY
End: 2023-12-03 | Stop reason: ENTERED-IN-ERROR

## 2023-10-10 RX ORDER — CHOLECALCIFEROL (VITAMIN D3) 25 MCG
5000 TABLET ORAL DAILY
Status: DISCONTINUED | OUTPATIENT
Start: 2023-10-10 | End: 2023-10-13 | Stop reason: HOSPADM

## 2023-10-10 RX ORDER — ASPIRIN 81 MG/1
81 TABLET ORAL DAILY
Status: ON HOLD | COMMUNITY
End: 2023-10-13 | Stop reason: SDUPTHER

## 2023-10-10 RX ORDER — SODIUM CHLORIDE 450 MG/100ML
60 INJECTION, SOLUTION INTRAVENOUS CONTINUOUS
Status: DISCONTINUED | OUTPATIENT
Start: 2023-10-10 | End: 2023-10-13 | Stop reason: HOSPADM

## 2023-10-10 RX ORDER — ACETAMINOPHEN 650 MG/1
650 SUPPOSITORY RECTAL EVERY 4 HOURS PRN
Status: DISCONTINUED | OUTPATIENT
Start: 2023-10-10 | End: 2023-10-13 | Stop reason: HOSPADM

## 2023-10-10 RX ORDER — ONDANSETRON HYDROCHLORIDE 2 MG/ML
4 INJECTION, SOLUTION INTRAVENOUS EVERY 6 HOURS PRN
Status: DISCONTINUED | OUTPATIENT
Start: 2023-10-10 | End: 2023-10-13 | Stop reason: HOSPADM

## 2023-10-10 RX ORDER — FERROUS SULFATE, DRIED 160(50) MG
1 TABLET, EXTENDED RELEASE ORAL DAILY
Status: DISCONTINUED | OUTPATIENT
Start: 2023-10-10 | End: 2023-10-13 | Stop reason: HOSPADM

## 2023-10-10 RX ORDER — ACETAMINOPHEN 500 MG
5000 TABLET ORAL DAILY
COMMUNITY
End: 2023-12-09 | Stop reason: HOSPADM

## 2023-10-10 RX ORDER — MULTIVIT-MIN/IRON FUM/FOLIC AC 7.5 MG-4
1 TABLET ORAL DAILY
Status: DISCONTINUED | OUTPATIENT
Start: 2023-10-10 | End: 2023-10-13 | Stop reason: HOSPADM

## 2023-10-10 RX ORDER — DONEPEZIL HYDROCHLORIDE 5 MG/1
5 TABLET, FILM COATED ORAL NIGHTLY
COMMUNITY
End: 2023-12-09 | Stop reason: HOSPADM

## 2023-10-10 RX ORDER — MORPHINE SULFATE 2 MG/ML
2 INJECTION, SOLUTION INTRAMUSCULAR; INTRAVENOUS EVERY 4 HOURS PRN
Status: DISCONTINUED | OUTPATIENT
Start: 2023-10-10 | End: 2023-10-13 | Stop reason: HOSPADM

## 2023-10-10 RX ORDER — ADHESIVE BANDAGE
30 BANDAGE TOPICAL DAILY PRN
COMMUNITY
End: 2023-12-09 | Stop reason: HOSPADM

## 2023-10-10 RX ORDER — ALUMINUM HYDROXIDE, MAGNESIUM HYDROXIDE, AND SIMETHICONE 1200; 120; 1200 MG/30ML; MG/30ML; MG/30ML
30 SUSPENSION ORAL EVERY 6 HOURS PRN
Status: DISCONTINUED | OUTPATIENT
Start: 2023-10-10 | End: 2023-10-13 | Stop reason: HOSPADM

## 2023-10-10 RX ORDER — EPINEPHRINE 0.22MG
100 AEROSOL WITH ADAPTER (ML) INHALATION DAILY
Status: DISCONTINUED | OUTPATIENT
Start: 2023-10-10 | End: 2023-10-13 | Stop reason: HOSPADM

## 2023-10-10 RX ORDER — ATORVASTATIN CALCIUM 40 MG/1
40 TABLET, FILM COATED ORAL DAILY
Status: DISCONTINUED | OUTPATIENT
Start: 2023-10-10 | End: 2023-10-13 | Stop reason: HOSPADM

## 2023-10-10 RX ORDER — METOPROLOL TARTRATE 50 MG/1
25 TABLET ORAL DAILY
COMMUNITY
End: 2023-12-09 | Stop reason: HOSPADM

## 2023-10-10 RX ORDER — DONEPEZIL HYDROCHLORIDE 5 MG/1
5 TABLET, FILM COATED ORAL NIGHTLY
Status: DISCONTINUED | OUTPATIENT
Start: 2023-10-10 | End: 2023-10-13 | Stop reason: HOSPADM

## 2023-10-10 RX ORDER — ONDANSETRON 4 MG/1
4 TABLET, FILM COATED ORAL EVERY 8 HOURS PRN
Status: DISCONTINUED | OUTPATIENT
Start: 2023-10-10 | End: 2023-10-13 | Stop reason: HOSPADM

## 2023-10-10 RX ORDER — ASPIRIN 81 MG/1
81 TABLET ORAL DAILY
Status: DISCONTINUED | OUTPATIENT
Start: 2023-10-10 | End: 2023-10-13 | Stop reason: HOSPADM

## 2023-10-10 RX ORDER — CEFAZOLIN SODIUM 2 G/50ML
2 SOLUTION INTRAVENOUS ONCE
Status: CANCELLED | OUTPATIENT
Start: 2023-10-10

## 2023-10-10 RX ORDER — METOPROLOL TARTRATE 50 MG/1
50 TABLET ORAL DAILY
Status: DISCONTINUED | OUTPATIENT
Start: 2023-10-10 | End: 2023-10-13 | Stop reason: HOSPADM

## 2023-10-10 RX ADMIN — SODIUM CHLORIDE, SODIUM LACTATE, POTASSIUM CHLORIDE, AND CALCIUM CHLORIDE 1000 ML: 600; 310; 30; 20 INJECTION, SOLUTION INTRAVENOUS at 00:05

## 2023-10-10 RX ADMIN — SODIUM CHLORIDE 60 ML/HR: 4.5 INJECTION, SOLUTION INTRAVENOUS at 08:13

## 2023-10-10 RX ADMIN — TRAZODONE HYDROCHLORIDE 25 MG: 50 TABLET ORAL at 21:32

## 2023-10-10 RX ADMIN — DONEPEZIL HYDROCHLORIDE 5 MG: 5 TABLET ORAL at 21:32

## 2023-10-10 RX ADMIN — DOCUSATE SODIUM 100 MG: 100 CAPSULE, LIQUID FILLED ORAL at 21:32

## 2023-10-10 SDOH — SOCIAL STABILITY: SOCIAL INSECURITY: HAVE YOU HAD THOUGHTS OF HARMING ANYONE ELSE?: UNABLE TO ASSESS

## 2023-10-10 SDOH — SOCIAL STABILITY: SOCIAL INSECURITY: POSSIBLE ABUSE REPORTED TO:: OTHER (COMMENT)

## 2023-10-10 SDOH — SOCIAL STABILITY: SOCIAL INSECURITY: HAS ANYONE EVER THREATENED TO HURT YOUR FAMILY OR YOUR PETS?: UNABLE TO ASSESS

## 2023-10-10 SDOH — HEALTH STABILITY: MENTAL HEALTH: EXPERIENCED ANY OF THE FOLLOWING LIFE EVENTS: OTHER (COMMENT)

## 2023-10-10 SDOH — SOCIAL STABILITY: SOCIAL INSECURITY: ARE YOU OR HAVE YOU BEEN THREATENED OR ABUSED PHYSICALLY, EMOTIONALLY, OR SEXUALLY BY ANYONE?: UNABLE TO ASSESS

## 2023-10-10 SDOH — SOCIAL STABILITY: SOCIAL INSECURITY: ARE THERE ANY APPARENT SIGNS OF INJURIES/BEHAVIORS THAT COULD BE RELATED TO ABUSE/NEGLECT?: UNABLE TO ASSESS

## 2023-10-10 SDOH — SOCIAL STABILITY: SOCIAL INSECURITY: DO YOU FEEL UNSAFE GOING BACK TO THE PLACE WHERE YOU ARE LIVING?: UNABLE TO ASSESS

## 2023-10-10 SDOH — SOCIAL STABILITY: SOCIAL INSECURITY: ABUSE: ADULT

## 2023-10-10 SDOH — SOCIAL STABILITY: SOCIAL INSECURITY: DO YOU FEEL ANYONE HAS EXPLOITED OR TAKEN ADVANTAGE OF YOU FINANCIALLY OR OF YOUR PERSONAL PROPERTY?: UNABLE TO ASSESS

## 2023-10-10 SDOH — SOCIAL STABILITY: SOCIAL INSECURITY: DOES ANYONE TRY TO KEEP YOU FROM HAVING/CONTACTING OTHER FRIENDS OR DOING THINGS OUTSIDE YOUR HOME?: UNABLE TO ASSESS

## 2023-10-10 SDOH — SOCIAL STABILITY: SOCIAL INSECURITY: WERE YOU ABLE TO COMPLETE ALL THE BEHAVIORAL HEALTH SCREENINGS?: NO

## 2023-10-10 ASSESSMENT — ACTIVITIES OF DAILY LIVING (ADL)
HEARING - LEFT EAR: FUNCTIONAL
BATHING: NEEDS ASSISTANCE
DRESSING YOURSELF: NEEDS ASSISTANCE
HEARING - RIGHT EAR: FUNCTIONAL
LACK_OF_TRANSPORTATION: PATIENT DECLINED
FEEDING YOURSELF: NEEDS ASSISTANCE
ADEQUATE_TO_COMPLETE_ADL: UNABLE TO ASSESS
GROOMING: NEEDS ASSISTANCE
WALKS IN HOME: NEEDS ASSISTANCE
PATIENT'S MEMORY ADEQUATE TO SAFELY COMPLETE DAILY ACTIVITIES?: NO
ASSISTIVE_DEVICE: OTHER (COMMENT)
TOILETING: NEEDS ASSISTANCE
JUDGMENT_ADEQUATE_SAFELY_COMPLETE_DAILY_ACTIVITIES: NO

## 2023-10-10 ASSESSMENT — COGNITIVE AND FUNCTIONAL STATUS - GENERAL
MOBILITY SCORE: 6
CLIMB 3 TO 5 STEPS WITH RAILING: TOTAL
STANDING UP FROM CHAIR USING ARMS: A LOT
PERSONAL GROOMING: TOTAL
MOVING TO AND FROM BED TO CHAIR: TOTAL
TOILETING: TOTAL
TOILETING: A LOT
TURNING FROM BACK TO SIDE WHILE IN FLAT BAD: A LITTLE
DAILY ACTIVITIY SCORE: 13
DRESSING REGULAR LOWER BODY CLOTHING: TOTAL
MOBILITY SCORE: 6
WALKING IN HOSPITAL ROOM: TOTAL
CLIMB 3 TO 5 STEPS WITH RAILING: TOTAL
DAILY ACTIVITIY SCORE: 8
MOVING TO AND FROM BED TO CHAIR: A LOT
PATIENT BASELINE BEDBOUND: NO
MOBILITY SCORE: 12
DRESSING REGULAR UPPER BODY CLOTHING: TOTAL
HELP NEEDED FOR BATHING: TOTAL
DAILY ACTIVITIY SCORE: 6
MOVING FROM LYING ON BACK TO SITTING ON SIDE OF FLAT BED WITH BEDRAILS: A LITTLE
TURNING FROM BACK TO SIDE WHILE IN FLAT BAD: TOTAL
DRESSING REGULAR LOWER BODY CLOTHING: TOTAL
EATING MEALS: A LITTLE
MOVING FROM LYING ON BACK TO SITTING ON SIDE OF FLAT BED WITH BEDRAILS: TOTAL
HELP NEEDED FOR BATHING: TOTAL
MOVING TO AND FROM BED TO CHAIR: TOTAL
DRESSING REGULAR UPPER BODY CLOTHING: A LOT
DRESSING REGULAR LOWER BODY CLOTHING: TOTAL
TURNING FROM BACK TO SIDE WHILE IN FLAT BAD: TOTAL
PERSONAL GROOMING: TOTAL
DRESSING REGULAR UPPER BODY CLOTHING: TOTAL
STANDING UP FROM CHAIR USING ARMS: TOTAL
EATING MEALS: TOTAL
STANDING UP FROM CHAIR USING ARMS: TOTAL
TOILETING: TOTAL
HELP NEEDED FOR BATHING: A LOT
EATING MEALS: A LITTLE
WALKING IN HOSPITAL ROOM: TOTAL
PERSONAL GROOMING: A LITTLE
WALKING IN HOSPITAL ROOM: TOTAL
CLIMB 3 TO 5 STEPS WITH RAILING: TOTAL
MOVING FROM LYING ON BACK TO SITTING ON SIDE OF FLAT BED WITH BEDRAILS: TOTAL

## 2023-10-10 ASSESSMENT — ENCOUNTER SYMPTOMS
ABDOMINAL PAIN: 0
CONFUSION: 1
SORE THROAT: 0
SHORTNESS OF BREATH: 0
HEADACHES: 0
FEVER: 0

## 2023-10-10 ASSESSMENT — LIFESTYLE VARIABLES
HOW MANY STANDARD DRINKS CONTAINING ALCOHOL DO YOU HAVE ON A TYPICAL DAY: PATIENT DOES NOT DRINK
HOW OFTEN DO YOU HAVE 6 OR MORE DRINKS ON ONE OCCASION: NEVER
HOW OFTEN DO YOU HAVE A DRINK CONTAINING ALCOHOL: NEVER
AUDIT-C TOTAL SCORE: 0
AUDIT-C TOTAL SCORE: 0
SKIP TO QUESTIONS 9-10: 1

## 2023-10-10 ASSESSMENT — PATIENT HEALTH QUESTIONNAIRE - PHQ9
SUM OF ALL RESPONSES TO PHQ9 QUESTIONS 1 & 2: 0
2. FEELING DOWN, DEPRESSED OR HOPELESS: NOT AT ALL
1. LITTLE INTEREST OR PLEASURE IN DOING THINGS: NOT AT ALL

## 2023-10-10 ASSESSMENT — PAIN - FUNCTIONAL ASSESSMENT
PAIN_FUNCTIONAL_ASSESSMENT: FLACC (FACE, LEGS, ACTIVITY, CRY, CONSOLABILITY)
PAIN_FUNCTIONAL_ASSESSMENT: FLACC (FACE, LEGS, ACTIVITY, CRY, CONSOLABILITY)

## 2023-10-10 NOTE — CONSULTS
Reason For Consult  Right femoral neck fracture     History Of Present Illness  Nay Frost is a 83 y.o. female presenting after a fall.  Unclear how she fell as she is a poor historian due to dementia.  She was complaining of pain in her right leg and was sent to [facility] for evaluation.  CT head and c-spine negative for traumatic injuries.  Right pelvic/femur x-rays demonstrated a moderately displaced right hip fracture with impaction.  Medicine was consulted and have cleared her for the operating room today for right hemiarthroplasty.       Past Medical History  She has a past medical history of Alzheimer's disease (CMS/[facility]), Amnesia, COVID-19, Crohn's disease (CMS/[facility]), Hyperlipidemia, Hypertension, Insomnia, Migraine, Osteoporosis, and Seizures (CMS/[facility]).    Surgical History  She has no past surgical history on file.     Social History  She reports that she does not currently use alcohol. She reports that she does not currently use drugs. No history on file for tobacco use.    Family History  No family history on file.     Allergies  Bee pollen, Erythromycin, [place] flavor, Iodine, Pneumococcal vaccine, Shellfish derived, Strawberry, Sulfa (sulfonamide antibiotics), and Tree nuts    Review of Systems  12 point ROS limited due to patient's dementia     Physical Exam  HENT:      Head: Normocephalic and atraumatic.   Eyes:      Extraocular Movements: Extraocular movements intact.      Conjunctiva/sclera: Conjunctivae normal.      Pupils: Pupils are equal, round, and reactive to light.   Cardiovascular:      Rate and Rhythm: Normal rate and regular rhythm.      Pulses: Normal pulses.   Pulmonary:      Breath sounds: Normal breath sounds.   Abdominal:      General: Bowel sounds are normal.      Palpations: Abdomen is soft.   Musculoskeletal:      Comments: No facial grimacing to palpation of right hip and femur, does not appear to be externally rotated, no leg shortening, motion and sensations intact,  "cap refill < 2, 2+ DP/PT pulses   Skin:     General: Skin is warm and dry.      Capillary Refill: Capillary refill takes less than 2 seconds.      Findings: Bruising present.   Neurological:      General: No focal deficit present.      Mental Status: She is alert. She is disoriented.      Motor: Weakness present.      Comments: Oriented to person only         Last Recorded Vitals  Blood pressure 144/73, pulse 79, temperature 36.2 °C (97.2 °F), temperature source Temporal, resp. rate 18, height 1.6 m (5' 3\"), weight 50.1 kg (110 lb 7.2 oz), SpO2 94 %.    Relevant Results     XR knee 1-2 views bilateral    Result Date: 10/9/2023  STUDY: Knee Radiographs; 10/09/2023 INDICATION: Fall. Left knee and right knee pain. COMPARISON: None Available. ACCESSION NUMBER(S): [id number] ORDERING CLINICIAN: NOY MCMANUS TECHNIQUE:  Two view(s) of the right and left knee. FINDINGS: Right Knee: There is no displaced fracture.  The alignment is anatomic.  Mild degenerative change present.  There is osteopenia.  Vascular arterial calcifications present.  No soft tissue abnormality is seen.  There is no joint effusion. Left Knee: The total knee arthroplasty is in anatomic alignment.  There is no displaced fracture.  There is no evidence of orthopedic hardware complications.  No soft tissue abnormality is seen.    Right Knee:  No fracture. Left Knee:  No acute fracture or hardware complication Signed by Glynn Dey MD    XR femur right 2+ views    Result Date: 10/9/2023  STUDY: Femur Radiographs; 10/09/2023 8:07 pm INDICATION: Right hip pain. COMPARISON: None Available. ACCESSION NUMBER(S): [id number] ORDERING CLINICIAN: NOY MCMANUS TECHNIQUE:  Eight view(s) of the right femur. FINDINGS:  Left hip arthroplasty noted.  Deformity of the right hip present compatible with a moderately impacted and displaced fracture.  There is osteopenia.  No other fractures identified.  No soft tissue abnormality is seen.    Right hip " fracture. Signed by Glynn Dey MD    XR chest 1 view    Result Date: 10/9/2023  STUDY: Chest Radiograph;  10/9/2023 at 8:08 PM INDICATION: Fall. COMPARISON: XR chest 2/2/2022. ACCESSION NUMBER(S): [id number] ORDERING CLINICIAN: NOY MCMANUS TECHNIQUE:  Frontal chest was obtained at 8:08 PM. FINDINGS: CARDIOMEDIASTINAL SILHOUETTE: Cardiomediastinal silhouette is normal in size and configuration.  LUNGS: Lungs are clear. No consolidation is appreciated and there is no effusion or pneumothorax.  ABDOMEN: No remarkable upper abdominal findings.  BONES: No acute osseous changes.    No acute cardiopulmonary disease. There is no change when compared to the prior chest x-ray.. Signed by David Wilde MD    XR pelvis 1-2 views    Result Date: 10/9/2023  STUDY: Pelvis Radiographs; 10/09/2023 8:08 pm INDICATION: Fall.  Hip pain. COMPARISON: None Available. ACCESSION NUMBER(S): [id number] ORDERING CLINICIAN: NOY MCMANUS TECHNIQUE:  Single view(s) of the pelvis. FINDINGS:  The pelvic ring is intact. There is a fracture the neck of the right femur. The fracture is impacted. The distal fracture fragment is superior to its normal location. There is a left hip arthroplasty.    Fracture the proximal right femur. Signed by Evangelist Sandhu MD    CT cervical spine wo IV contrast    Result Date: 10/9/2023  Interpreted By:  Heath Elizabeth, STUDY: CT CERVICAL SPINE WO IV CONTRAST;  10/9/2023 7:24 pm   INDICATION: Signs/Symptoms:fall unwittnessed.   COMPARISON: None.   ACCESSION NUMBER(S): [id number]   ORDERING CLINICIAN: NOY MCMANUS   TECHNIQUE: Contiguous axial images of the cervical spine were obtained without intravenous contrast. Coronal and sagittal reformatted images were obtained from the axial images.   FINDINGS: No evidence acute fracture of the cervical spine. There is multilevel degenerative change of the cervical spine. There is multilevel intervertebral disc space narrowing and degenerative disc  disease. There is multilevel anterior osseous spurring. There is limited evaluation of the soft tissues of the spinal canal. There are multiple degenerative facet and uncovertebral arthropathy. No significant prevertebral soft tissue edema.       No evidence of acute fracture of the cervical spine.   Multilevel degenerative change of the cervical spine.   MACRO: None   Signed by: Heath Elizabeth 10/9/2023 8:06 PM Dictation workstation:   [id number]    CT head wo IV contrast    Result Date: 10/9/2023  Interpreted By:  Heath Elizabeth, STUDY: CT HEAD WO IV CONTRAST;  10/9/2023 7:24 pm   INDICATION: fall unwittnessed.   COMPARISON: 6/13/2023   ACCESSION NUMBER(S): [id number]   ORDERING CLINICIAN: NOY MCMANUS   TECHNIQUE: Contiguous axial images of the head were obtained without intravenous contrast.   FINDINGS: BRAIN PARENCHYMA:  There is cerebral atrophy and bilateral chronic white matter change. The gray white matter differentiation is preserved.  No mass effect or midline shift.   HEMORRHAGE:  No evidence of acute intracranial hemorrhage. VENTRICLES AND EXTRA-AXIAL SPACES:  The ventricles are stable in size and configuration with stable mild asymmetric dilatation of the right temporal horn. No evidence of abnormal extraaxial fluid collection. EXTRACRANIAL SOFT TISSUES:  Within normal limits. PARANASAL SINUSES/MASTOIDS:  The visualized paranasal sinuses and mastoid air cells are clear and well pneumatized. CALVARIUM:  No evidence of depressed calvarial fracture.   OTHER FINDINGS:  None       No evidence of acute intracranial hemorrhage or depressed calvarial fracture.   Cerebral atrophy and bilateral chronic white matter change.   MACRO: None   Signed by: Heath Elizabeth 10/9/2023 8:05 PM Dictation workstation:   [id number]      Scheduled medications  docusate sodium, 100 mg, oral, BID    Continuous medications     PRN medications  PRN medications: acetaminophen **OR** acetaminophen **OR** acetaminophen,  alum-mag hydroxide-simeth, dextromethorphan-guaifenesin, guaiFENesin, ondansetron **OR** ondansetron    Results for orders placed or performed during the hospital encounter of 10/09/23 (from the past 24 hour(s))   CBC and Auto Differential   Result Value Ref Range    WBC 10.0 4.4 - 11.3 x10*3/uL    nRBC 0.0 0.0 - 0.0 /100 WBCs    RBC 4.10 4.00 - 5.20 x10*6/uL    Hemoglobin 12.6 12.0 - 16.0 g/dL    Hematocrit 40.1 36.0 - 46.0 %    MCV 98 80 - 100 fL    MCH 30.7 26.0 - 34.0 pg    MCHC 31.4 (L) 32.0 - 36.0 g/dL    RDW 14.1 11.5 - 14.5 %    Platelets 117 (L) 150 - 450 x10*3/uL    MPV 12.5 (H) 7.5 - 11.5 fL    Neutrophils % 89.8 40.0 - 80.0 %    Immature Granulocytes %, Automated 0.4 0.0 - 0.9 %    Lymphocytes % 5.9 13.0 - 44.0 %    Monocytes % 3.6 2.0 - 10.0 %    Eosinophils % 0.1 0.0 - 6.0 %    Basophils % 0.2 0.0 - 2.0 %    Neutrophils Absolute 8.93 (H) 1.60 - 5.50 x10*3/uL    Immature Granulocytes Absolute, Automated 0.04 0.00 - 0.50 x10*3/uL    Lymphocytes Absolute 0.59 (L) 0.80 - 3.00 x10*3/uL    Monocytes Absolute 0.36 0.05 - 0.80 x10*3/uL    Eosinophils Absolute 0.01 0.00 - 0.40 x10*3/uL    Basophils Absolute 0.02 0.00 - 0.10 x10*3/uL   Comprehensive metabolic panel   Result Value Ref Range    Glucose 122 (H) 74 - 99 mg/dL    Sodium 150 (H) 136 - 145 mmol/L    Potassium 4.1 3.5 - 5.3 mmol/L    Chloride 109 (H) 98 - 107 mmol/L    Bicarbonate 32 21 - 32 mmol/L    Anion Gap 13 10 - 20 mmol/L    Urea Nitrogen 42 (H) 6 - 23 mg/dL    Creatinine 1.11 (H) 0.50 - 1.05 mg/dL    eGFR 49 (L) >60 mL/min/1.73m*2    Calcium 9.9 8.6 - 10.3 mg/dL    Albumin 3.9 3.4 - 5.0 g/dL    Alkaline Phosphatase 93 33 - 136 U/L    Total Protein 6.8 6.4 - 8.2 g/dL    AST 30 9 - 39 U/L    Bilirubin, Total 0.9 0.0 - 1.2 mg/dL    ALT 27 7 - 45 U/L   Protime-INR   Result Value Ref Range    Protime 13.4 (H) 9.8 - 12.8 seconds    INR 1.2 (H) 0.9 - 1.1   Type And Screen   Result Value Ref Range    ABO TYPE O     Rh TYPE POS     ANTIBODY SCREEN NEG     VERIFY ABO/Rh Group Test   Result Value Ref Range    ABO TYPE O     Rh TYPE POS    Basic metabolic panel   Result Value Ref Range    Glucose 128 (H) 74 - 99 mg/dL    Sodium 148 (H) 136 - 145 mmol/L    Potassium 3.9 3.5 - 5.3 mmol/L    Chloride 111 (H) 98 - 107 mmol/L    Bicarbonate 27 21 - 32 mmol/L    Anion Gap 14 10 - 20 mmol/L    Urea Nitrogen 38 (H) 6 - 23 mg/dL    Creatinine 1.00 0.50 - 1.05 mg/dL    eGFR 55 (L) >60 mL/min/1.73m*2    Calcium 9.2 8.6 - 10.3 mg/dL   CBC   Result Value Ref Range    WBC 8.4 4.4 - 11.3 x10*3/uL    nRBC 0.0 0.0 - 0.0 /100 WBCs    RBC 3.77 (L) 4.00 - 5.20 x10*6/uL    Hemoglobin 11.6 (L) 12.0 - 16.0 g/dL    Hematocrit 37.1 36.0 - 46.0 %    MCV 98 80 - 100 fL    MCH 30.8 26.0 - 34.0 pg    MCHC 31.3 (L) 32.0 - 36.0 g/dL    RDW 14.0 11.5 - 14.5 %    Platelets 104 (L) 150 - 450 x10*3/uL    MPV 11.8 (H) 7.5 - 11.5 fL        Assessment/Plan   83 year old female, mechanical fall.  Right femoral neck fracture with impaction  - medicine has cleared patient for the OR  - will attempt to obtain consent from patient's daughter Zeenat  - planning for right hip hemiarthroplasty today with Dr. Terrell  - will order [facility] for pre-op  - remainder of care per medicine    Patient discussed with Dr. Xander Nuñez, APRN-CNP    Addendum: Patient seen and examined and agree with above.  Multiple attempts at contacting patient's power of  have been unsuccessful.  After thoroughly reviewing associated risks, benefits and alternatives consent has been signed by another physician.

## 2023-10-10 NOTE — CONSULTS
Nutrition Assessment Note    Reason for Assessment  Reason for Assessment: Admission nursing screening, Dietitian discretion    Energy Intake: Poor < 50 %  Food and Nutrient History: Pt noted d/ox4, baseline dementia, lives in Alzheim[facility] Unit at [facility], spoke with RN Lynne, states pt does not eat well at baseline d/t confusion, has limited attention, will get up and walk around with food in her hand and sometimes take a few more bites. Pt requires a high amount of encouragement and reminds to eat, can get confused and pour drinks onto food. Will get irritated if she does not want to eat, then will refuse.    Previous Diet / Nutrition Education / Counseling: Per [facility]: Regular, thin liquids, danette cup with lid, finger foods. Boost VHC 240mL BID, House supplement Medpass 2.0 120mL TID, Magic cup 1/day      Physical Ability to Complete Tasks for Meal Preparation: No  Physical Ability to Self-Feed: Yes  Ability to Use Adaptive Eating Devices: Yes (danette cup per SNF)  Remembers to Eat: No  Recalls Eating: No    Difficulty chewing: ANGELINE  Difficulty swallowing: ANGELINE -no known history    Objective   Per Flowsheet Percent Meal intake:  none noted  Dietary Orders (From admission, onward)       Start     Ordered    10/10/23 0354  NPO Diet; Effective now  Diet effective now         10/10/23 0354                  Current Facility-Administered Medications   Medication Dose Route Frequency Provider Last Rate Last Admin    acetaminophen (Tylenol) tablet 650 mg  650 mg oral q4h PRN Bismark Tobar MD        Or    acetaminophen (Tylenol) oral liquid 650 mg  650 mg oral q4h PRN Bismark Tobar MD        Or    acetaminophen (Tylenol) suppository 650 mg  650 mg rectal q4h PRN Bismark Tobar MD        alum-mag hydroxide-simeth (Mylanta) 200-200-20 mg/5 mL oral suspension 30 mL  30 mL oral q6h PRN Bismark Tobar MD        aspirin EC tablet 81 mg  81 mg oral Daily Bismark Tobar MD        atorvastatin  (Lipitor) tablet 40 mg  40 mg oral Daily Bismark Tobar MD        calcium carbonate-vitamin D3 500 mg-5 mcg (200 unit) per tablet 1 tablet  1 tablet oral Daily Bismark Tobar MD        cholecalciferol (Vitamin D-3) tablet 5,000 Units  5,000 Units oral Daily Bismark Tobar MD        coenzyme Q-10 capsule 100 mg  100 mg oral Daily Bismark Tobar MD        dextromethorphan-guaifenesin (Robitussin DM)  mg/5 mL oral liquid 5 mL  5 mL oral q4h PRN Bismark Tobar MD        docusate sodium (Colace) capsule 100 mg  100 mg oral BID Bismark Tobar MD        donepezil (Aricept) tablet 5 mg  5 mg oral Nightly Bismark Tobar MD        guaiFENesin (Mucinex) 12 hr tablet 600 mg  600 mg oral q12h PRN Bismark Tobar MD        metoprolol tartrate (Lopressor) tablet 50 mg  50 mg oral Daily Bismark Tobar MD        morphine injection 1 mg  1 mg intravenous q4h PRN Bismark Tobar MD        multivitamin with minerals 1 tablet  1 tablet oral Daily Bismark Tobar MD        ondansetron (Zofran) tablet 4 mg  4 mg oral q8h PRN Bismark Tobar MD        Or    ondansetron (Zofran) injection 4 mg  4 mg intravenous q8h PRN Bismark Tobar MD        sodium chloride 0.45 % infusion  60 mL/hr intravenous Continuous Bismark Tobar MD 60 mL/hr at 10/10/23 0813 60 mL/hr at 10/10/23 0813    thiamine (Vitamin B-1) tablet 100 mg  100 mg oral Daily Bismark Tobar MD        traZODone (Desyrel) tablet 25 mg  25 mg oral Nightly Bismark Tobar MD         Past Medical History:   Diagnosis Date    Alzheimer's disease (CMS/HCC)     Amnesia     COVID-19     Crohn's disease (CMS/HCC)     Hyperlipidemia     Hypertension     Insomnia     Migraine     Osteoporosis     Seizures (CMS/HCC)      Principal Problem:    Closed right hip fracture, initial encounter (CMS/[facility])     Allergies: Bee pollen, Erythromycin, [place] flavor, Iodine, Pneumococcal vaccine, Shellfish derived, Strawberry, Sulfa  "(sulfonamide antibiotics), and Tree nuts      Anthropometrics:  Height: 160 cm (5' 3\")  Weight: 50.1 kg (110 lb 7.2 oz)  BMI (Calculated): 19.57      Daily Weight  10/10/23 : 50.1 kg (110 lb 7.2 oz)  07/01/22 : 55.5 kg (122 lb 6 oz)    Estimated Energy Needs: 1252-1503kcal (25-30kcal/kg CBW)  Estimated Protein Needs: 50g (1g/kg CBW)  Estimated Fluid Needs: 1mL/kcal EEN    Results from last 7 days   Lab Units 10/10/23  0506 10/09/23  2318   GLUCOSE mg/dL 128* 122*   SODIUM mmol/L 148* 150*   POTASSIUM mmol/L 3.9 4.1   CHLORIDE mmol/L 111* 109*   CO2 mmol/L 27 32   BUN mg/dL 38* 42*   CREATININE mg/dL 1.00 1.11*   EGFR mL/min/1.73m*2 55* 49*   CALCIUM mg/dL 9.2 9.9     No results found for: \"HGBA1C\"        Nutrition Focused Physical Findings: Defer at this time, pt confused, not appropriate and planned surgery today    Skin: no noted areas  (please see nursing/wound notes for further details)  Edema: none noted  Score: FLACC (Rest):  [2-4]      GI per flowsheet:  Gastrointestinal  Gastrointestinal (WDL): Within Defined Limits  Last bowel movement documented:      Diagnosis      Pt has Nutrition Diagnosis: Yes  Diagnosis Status (1): New  Nutrition Diagnosis 1: Inadequate energy intake  Related to (1): altered mental status  As Evidenced by (1): noted pt forgets to eat, often poor intakes, weight loss over 1year      Intervention:  Coordination of Care: Lynne CORREA from [facility] care   Individualized Nutrition Prescription Provided for : Ensure Plus HP BID (350kcal, 20g protein each), Magic cup 1/day (290,9g protein each)    Goals:  PO >50% meals  Consumes >50% oral nutrition supplements    Recommendation(s):  Encourage PO as able once diet advance  Ensure Plus HP BID  Magic cup 1/day    Monitoring and Evaluation   Weights  Labs  PO intake  Supplement intake/acceptance            Time Spent (min): 90 minutes  Last Date of Nutrition Visit: 10/10/23  Nutrition Follow-Up Needed?: Dietitian to reassess per " policy

## 2023-10-10 NOTE — PROGRESS NOTES
Nay Frost is a 83 y.o. female on day 0 of admission presenting with Closed right hip fracture, initial encounter (CMS/[facility]).      Subjective   Patient appears uncomfortable       Objective     Last Recorded Vitals  /87   Pulse 85   Temp 37 °C (98.6 °F) (Temporal)   Resp 18   Wt 50.1 kg (110 lb 7.2 oz)   SpO2 98%   Intake/Output last 3 Shifts:    Intake/Output Summary (Last 24 hours) at 10/10/2023 1858  Last data filed at 10/10/2023 0447  Gross per 24 hour   Intake 1000 ml   Output --   Net 1000 ml       Admission Weight  Weight: 61 kg (134 lb 7.7 oz) (10/09/23 1824)    Daily Weight  10/10/23 : 50.1 kg (110 lb 7.2 oz)    Image Results  XR knee 1-2 views bilateral  Narrative: STUDY:  Knee Radiographs; 10/09/2023  INDICATION:  Fall. Left knee and right knee pain.  COMPARISON:  None Available.  ACCESSION NUMBER(S):  [id number]  ORDERING CLINICIAN:  NOY MCMANUS  TECHNIQUE:  Two view(s) of the right and left knee.  FINDINGS:   Right Knee:  There is no displaced fracture.  The alignment is anatomic.  Mild  degenerative change present.  There is osteopenia.  Vascular arterial  calcifications present.  No soft tissue abnormality is seen.  There is  no joint effusion.   Left Knee:  The total knee arthroplasty is in anatomic alignment.  There is no  displaced fracture.  There is no evidence of orthopedic hardware  complications.  No soft tissue abnormality is seen.  Impression: Right Knee:  No fracture.  Left Knee:  No acute fracture or hardware complication  Signed by Glynn Dey MD  XR femur right 2+ views  Narrative: STUDY:  Femur Radiographs; 10/09/2023 8:07 pm  INDICATION:  Right hip pain.  COMPARISON:  None Available.  ACCESSION NUMBER(S):  [id number]  ORDERING CLINICIAN:  NOY MCMANUS  TECHNIQUE:  Eight view(s) of the right femur.  FINDINGS:    Left hip arthroplasty noted.  Deformity of the right hip present  compatible with a moderately impacted and displaced fracture.   There  is osteopenia.  No other fractures identified.  No soft tissue  abnormality is seen.  Impression: Right hip fracture.  Signed by Glynn Dey MD  XR chest 1 view  Narrative: STUDY:  Chest Radiograph;  10/9/2023 at 8:08 PM  INDICATION:  Fall.  COMPARISON:  XR chest 2/2/2022.  ACCESSION NUMBER(S):  [id number]  ORDERING CLINICIAN:  NOY MCMANUS  TECHNIQUE:  Frontal chest was obtained at 8:08 PM.  FINDINGS:  CARDIOMEDIASTINAL SILHOUETTE:  Cardiomediastinal silhouette is normal in size and configuration.     LUNGS:  Lungs are clear. No consolidation is appreciated and there is no  effusion or pneumothorax.     ABDOMEN:  No remarkable upper abdominal findings.     BONES:  No acute osseous changes.  Impression: No acute cardiopulmonary disease. There is no change when compared to  the prior chest x-ray..  Signed by David Wilde MD  XR pelvis 1-2 views  Narrative: STUDY:  Pelvis Radiographs; 10/09/2023 8:08 pm  INDICATION:  Fall.  Hip pain.  COMPARISON:  None Available.  ACCESSION NUMBER(S):  [id number]  ORDERING CLINICIAN:  NOY MCMANUS  TECHNIQUE:  Single view(s) of the pelvis.  FINDINGS:    The pelvic ring is intact. There is a fracture the neck of the right  femur. The fracture is impacted. The distal fracture fragment is  superior to its normal location. There is a left hip arthroplasty.  Impression: Fracture the proximal right femur.  Signed by Evangelist Sandhu MD  CT cervical spine wo IV contrast  Narrative: Interpreted By:  Heath Elizabeth,   STUDY:  CT CERVICAL SPINE WO IV CONTRAST;  10/9/2023 7:24 pm      INDICATION:  Signs/Symptoms:fall unwittnessed.      COMPARISON:  None.      ACCESSION NUMBER(S):  [id number]      ORDERING CLINICIAN:  NOY MCMANUS      TECHNIQUE:  Contiguous axial images of the cervical spine were obtained without  intravenous contrast. Coronal and sagittal reformatted images were  obtained from the axial images.      FINDINGS:  No evidence acute fracture of the  cervical spine. There is multilevel  degenerative change of the cervical spine. There is multilevel  intervertebral disc space narrowing and degenerative disc disease.  There is multilevel anterior osseous spurring. There is limited  evaluation of the soft tissues of the spinal canal. There are  multiple degenerative facet and uncovertebral arthropathy. No  significant prevertebral soft tissue edema.      Impression: No evidence of acute fracture of the cervical spine.      Multilevel degenerative change of the cervical spine.      MACRO:  None      Signed by: Heath Elizabeth 10/9/2023 8:06 PM  Dictation workstation:   [id number]  CT head wo IV contrast  Narrative: Interpreted By:  Heath Elizabeth,   STUDY:  CT HEAD WO IV CONTRAST;  10/9/2023 7:24 pm      INDICATION:  fall unwittnessed.      COMPARISON:  6/13/2023      ACCESSION NUMBER(S):  [id number]      ORDERING CLINICIAN:  NOY MCMANUS      TECHNIQUE:  Contiguous axial images of the head were obtained without intravenous  contrast.      FINDINGS:  BRAIN PARENCHYMA:  There is cerebral atrophy and bilateral chronic  white matter change. The gray white matter differentiation is  preserved.  No mass effect or midline shift.      HEMORRHAGE:  No evidence of acute intracranial hemorrhage.  VENTRICLES AND EXTRA-AXIAL SPACES:  The ventricles are stable in size  and configuration with stable mild asymmetric dilatation of the right  temporal horn. No evidence of abnormal extraaxial fluid collection.  EXTRACRANIAL SOFT TISSUES:  Within normal limits. PARANASAL  SINUSES/MASTOIDS:  The visualized paranasal sinuses and mastoid air  cells are clear and well pneumatized. CALVARIUM:  No evidence of  depressed calvarial fracture.      OTHER FINDINGS:  None      Impression: No evidence of acute intracranial hemorrhage or depressed calvarial  fracture.      Cerebral atrophy and bilateral chronic white matter change.      MACRO:  None      Signed by: Heath Elizabeth 10/9/2023  8:05 PM  Dictation workstation:   [id number]      Physical Exam  Constitutional:       Appearance: She is ill-appearing.   HENT:      Mouth/Throat:      Mouth: Mucous membranes are dry.   Eyes:      Extraocular Movements: Extraocular movements intact.   Cardiovascular:      Rate and Rhythm: Normal rate.   Musculoskeletal:         General: Swelling present.      Right lower leg: Edema present.   Skin:     Coloration: Skin is pale.   Neurological:      Mental Status: She is alert. She is disoriented.   Psychiatric:         Cognition and Memory: Cognition is impaired.         Relevant Results               Assessment/Plan                  Principal Problem:    Closed right hip fracture, initial encounter (CMS/[facility])    Closed right hip fracture, initial encounter (CMS/[facility])  Hypernatremia secondary to free water depletion  Azotemia related to #2  Advanced Alzheimer's Dementia     PLAN:    Orthopedics consulted  Pain control  Although there is no evidence of pt being able to do >4METS, the absence of any structural heart disease or any active cardiac symptoms permits her to undergo surgery with acceptable cardiovascular  risk  Will volume expand with hypotonic IVF  Repeat BMP  Fall precaution      Dispo: Ortho to take the patient to OR on 10/11/23.              Michell Myers, APRN-CNP

## 2023-10-10 NOTE — PROGRESS NOTES
Signed out to me pending admission.  Found to have a femur fracture.  Previous team spoke to Ortho, Dr. Terrell.  Preop labs obtained and patient has a hypernatremia.  Given a liter of IV fluids.  Patient admitted to medicine with Ortho on consult.    Francisca Marie,

## 2023-10-10 NOTE — H&P
History Of Present Illness  Nay Frost is an 83 y.o. female, [facility] resident, with history of dementia who presented with a fall and pain in right hip. Pt is not able to provide any history at all due to dementia. Per EMR and [facility] documentation, she had an unwitnessed fall at the [facility] yesterday. Afterwards, she complained of pain in right leg particularly the right hip. She was brought to [facility] ED and imaging studies revealed a fracture of the right proximal femur. Pt denies having any chest pain, palpitations or SOB. She supposedly is wheel chair bound. She has no documented history of structural heart disease.      Past Medical History  Past Medical History:   Diagnosis Date    Alzheimer's disease (CMS/[facility])     Amnesia     COVID-19     Crohn's disease (CMS/[facility])     Hyperlipidemia     Hypertension     Insomnia     Migraine     Osteoporosis     Seizures (CMS/[facility])        Surgical History  History reviewed. No pertinent surgical history.     Social History  She reports that she does not currently use alcohol. She reports that she does not currently use drugs. No history on file for tobacco use.    Family History  Patient unable to provide      Allergies  Erythromycin, Iodine, Shellfish derived, and Sulfa (sulfonamide antibiotics)    Review of Systems   Constitutional:  Negative for fever.   HENT:  Negative for sore throat.    Respiratory:  Negative for shortness of breath.    Cardiovascular:  Negative for chest pain.   Gastrointestinal:  Negative for abdominal pain.   Musculoskeletal:         See HPI   Neurological:  Negative for headaches.   Psychiatric/Behavioral:  Positive for confusion.         Physical Exam  Constitutional:       General: She is in acute distress.      Appearance: She is not ill-appearing, toxic-appearing or diaphoretic.      Comments: Elderly female who is alert and oriented only to self. She appears uncomfortable due to pain    HENT:      Head: Normocephalic and  "atraumatic.      Nose: Nose normal.      Mouth/Throat:      Mouth: Mucous membranes are dry.   Eyes:      General: No scleral icterus.        Right eye: No discharge.         Left eye: No discharge.      Conjunctiva/sclera: Conjunctivae normal.   Neck:      Vascular: No carotid bruit.   Cardiovascular:      Rate and Rhythm: Normal rate and regular rhythm.      Heart sounds: Normal heart sounds. No murmur heard.  Pulmonary:      Breath sounds: No wheezing, rhonchi or rales.   Abdominal:      General: Abdomen is flat.      Palpations: There is no mass.      Tenderness: There is no abdominal tenderness. There is no guarding or rebound.   Musculoskeletal:         General: Tenderness present.      Right lower leg: No edema.      Left lower leg: No edema.      Comments: LROM and tenderness in right hip   Lymphadenopathy:      Cervical: No cervical adenopathy.   Skin:     Findings: No lesion or rash.   Neurological:      Mental Status: She is alert. She is disoriented.   Psychiatric:         Mood and Affect: Mood normal.         Behavior: Behavior normal.          Last Recorded Vitals  Blood pressure 157/74, pulse 80, temperature 36.3 °C (97.3 °F), temperature source Temporal, resp. rate 18, height 1.651 m (5' 5\"), weight 61 kg (134 lb 7.7 oz), SpO2 94 %.    Relevant Results     Latest Reference Range & Units 10/09/23 23:18   ANTIBODY SCREEN  NEG   ABO TYPE  O   Rh Type  POS   GLUCOSE 74 - 99 mg/dL 122 (H)   SODIUM 136 - 145 mmol/L 150 (H)   POTASSIUM 3.5 - 5.3 mmol/L 4.1   CHLORIDE 98 - 107 mmol/L 109 (H)   Bicarbonate 21 - 32 mmol/L 32   Anion Gap 10 - 20 mmol/L 13   Blood Urea Nitrogen 6 - 23 mg/dL 42 (H)   Creatinine 0.50 - 1.05 mg/dL 1.11 (H)   EGFR >60 mL/min/1.73m*2 49 (L)   Calcium 8.6 - 10.3 mg/dL 9.9   Albumin 3.4 - 5.0 g/dL 3.9   Alkaline Phosphatase 33 - 136 U/L 93   ALT 7 - 45 U/L 27   AST 9 - 39 U/L 30   Bilirubin Total 0.0 - 1.2 mg/dL 0.9   Total Protein 6.4 - 8.2 g/dL 6.8   INR 0.9 - 1.1  1.2 (H)   Protime " 9.8 - 12.8 seconds 13.4 (H)   WBC 4.4 - 11.3 x10*3/uL 10.0   nRBC 0.0 - 0.0 /100 WBCs 0.0   RBC 4.00 - 5.20 x10*6/uL 4.10   HEMOGLOBIN 12.0 - 16.0 g/dL 12.6   HEMATOCRIT 36.0 - 46.0 % 40.1   MCV 80 - 100 fL 98   MCH 26.0 - 34.0 pg 30.7   MCHC 32.0 - 36.0 g/dL 31.4 (L)   RED CELL DISTRIBUTION WIDTH 11.5 - 14.5 % 14.1   Platelets 150 - 450 x10*3/uL 117 (L)   MEAN PLATELET VOLUME 7.5 - 11.5 fL 12.5 (H)   Neutrophils % 40.0 - 80.0 % 89.8   Immature Granulocytes %, Automated 0.0 - 0.9 % 0.4   Lymphocytes % 13.0 - 44.0 % 5.9   Monocytes % 2.0 - 10.0 % 3.6   Eosinophils % 0.0 - 6.0 % 0.1   Basophils % 0.0 - 2.0 % 0.2   Neutrophils Absolute 1.60 - 5.50 x10*3/uL 8.93 (H)   Immature Granulocytes Absolute, Automated 0.00 - 0.50 x10*3/uL 0.04   Lymphocytes Absolute 0.80 - 3.00 x10*3/uL 0.59 (L)   Monocytes Absolute 0.05 - 0.80 x10*3/uL 0.36   Eosinophils Absolute 0.00 - 0.40 x10*3/uL 0.01   Basophils Absolute 0.00 - 0.10 x10*3/uL 0.02   (H): Data is abnormally high  (L): Data is abnormally low    XRAY PELVIS and HIP:  Right proximal femur fracture       Assessment/Plan   Principal Problem:  Closed right hip fracture, initial encounter (CMS/[facility])  Hypernatremia secondary to free water depletion  Azotemia related to #2  Advanced Alzheimer's Dementia    PLAN:    Orthopedics consulted  Pain control  Although there is no evidence of pt being able to do >4METS, the absence of any structural heart disease or any active cardiac symptoms permits her to undergo surgery with acceptable cardiovascular  risk  Will volume expand with hypotonic IVF      I spent 60 minutes in the professional and overall care of this patient.      Bismark Tobar MD

## 2023-10-10 NOTE — CARE PLAN
Patient admitted via wheelchair to room. ID bands cross matched with L&D rn. Oriented to room. Safety precautions initiated. Bed in low position Call light in reach. D/C teachings initiated bedside.  Plan of care discussed Pt told to call for asst when aramis The patient's goals for the shift include  to feel better    The clinical goals for the shift include to rest without discomfort    Over the shift, the patient did not make progress toward the following goals. Barriers to progression include. Dementia diagnosis   Recommendations to address these barriers include .

## 2023-10-10 NOTE — CARE PLAN
The patient's goals for the shift include      The clinical goals for the shift include to rest without discomfort    Over the shift, the patient did not make progress toward the following goals. Barriers to progression include. Recommendations to address these barriers include .

## 2023-10-10 NOTE — PROGRESS NOTES
10/10/23 1142   Discharge Planning   Living Arrangements Alone  ([facility])   Support Systems Children   Assistance Needed Patient is from [facility]. Called over to [facility] to get patient baseline and message was left.Will await for return call.   Type of Residence Nursing home/residential care   Home or Post Acute Services Post acute facilities (Rehab/SNF/etc)   Type of Post Acute Facility Services Long term care   Patient expects to be discharged to: Plan is to return to [facility]   Does the patient need discharge transport arranged? Yes   RoundTrip coordination needed? Yes   Has discharge transport been arranged? No

## 2023-10-11 ENCOUNTER — APPOINTMENT (OUTPATIENT)
Dept: RADIOLOGY | Facility: HOSPITAL | Age: 83
DRG: 522 | End: 2023-10-11
Payer: COMMERCIAL

## 2023-10-11 LAB
ANION GAP SERPL CALC-SCNC: 13 MMOL/L (ref 10–20)
BUN SERPL-MCNC: 30 MG/DL (ref 6–23)
CALCIUM SERPL-MCNC: 9.3 MG/DL (ref 8.6–10.3)
CHLORIDE SERPL-SCNC: 108 MMOL/L (ref 98–107)
CO2 SERPL-SCNC: 29 MMOL/L (ref 21–32)
CREAT SERPL-MCNC: 0.9 MG/DL (ref 0.5–1.05)
ERYTHROCYTE [DISTWIDTH] IN BLOOD BY AUTOMATED COUNT: 14.2 % (ref 11.5–14.5)
GFR SERPL CREATININE-BSD FRML MDRD: 63 ML/MIN/1.73M*2
GLUCOSE SERPL-MCNC: 132 MG/DL (ref 74–99)
HCT VFR BLD AUTO: 38.4 % (ref 36–46)
HGB BLD-MCNC: 11.8 G/DL (ref 12–16)
MCH RBC QN AUTO: 30.5 PG (ref 26–34)
MCHC RBC AUTO-ENTMCNC: 30.7 G/DL (ref 32–36)
MCV RBC AUTO: 99 FL (ref 80–100)
NRBC BLD-RTO: 0 /100 WBCS (ref 0–0)
PLATELET # BLD AUTO: 99 X10*3/UL (ref 150–450)
PMV BLD AUTO: 12.1 FL (ref 7.5–11.5)
POTASSIUM SERPL-SCNC: 4.3 MMOL/L (ref 3.5–5.3)
RBC # BLD AUTO: 3.87 X10*6/UL (ref 4–5.2)
SODIUM SERPL-SCNC: 146 MMOL/L (ref 136–145)
WBC # BLD AUTO: 9.3 X10*3/UL (ref 4.4–11.3)

## 2023-10-11 PROCEDURE — 99221 1ST HOSP IP/OBS SF/LOW 40: CPT | Performed by: SURGERY

## 2023-10-11 PROCEDURE — 2500000005 HC RX 250 GENERAL PHARMACY W/O HCPCS: Performed by: NURSE ANESTHETIST, CERTIFIED REGISTERED

## 2023-10-11 PROCEDURE — 2500000004 HC RX 250 GENERAL PHARMACY W/ HCPCS (ALT 636 FOR OP/ED): Performed by: INTERNAL MEDICINE

## 2023-10-11 PROCEDURE — 2580000001 HC RX 258 IV SOLUTIONS: Performed by: NURSE ANESTHETIST, CERTIFIED REGISTERED

## 2023-10-11 PROCEDURE — A9999 DME SUPPLY OR ACCESSORY, NOS: HCPCS | Performed by: ORTHOPAEDIC SURGERY

## 2023-10-11 PROCEDURE — 2500000004 HC RX 250 GENERAL PHARMACY W/ HCPCS (ALT 636 FOR OP/ED): Performed by: ORTHOPAEDIC SURGERY

## 2023-10-11 PROCEDURE — 1100000001 HC PRIVATE ROOM DAILY

## 2023-10-11 PROCEDURE — 0SRR0J9 REPLACEMENT OF RIGHT HIP JOINT, FEMORAL SURFACE WITH SYNTHETIC SUBSTITUTE, CEMENTED, OPEN APPROACH: ICD-10-PCS | Performed by: ORTHOPAEDIC SURGERY

## 2023-10-11 PROCEDURE — 3700000001 HC GENERAL ANESTHESIA TIME - INITIAL BASE CHARGE: Performed by: ORTHOPAEDIC SURGERY

## 2023-10-11 PROCEDURE — 96372 THER/PROPH/DIAG INJ SC/IM: CPT | Performed by: ORTHOPAEDIC SURGERY

## 2023-10-11 PROCEDURE — P9045 ALBUMIN (HUMAN), 5%, 250 ML: HCPCS | Mod: JZ | Performed by: NURSE ANESTHETIST, CERTIFIED REGISTERED

## 2023-10-11 PROCEDURE — C1776 JOINT DEVICE (IMPLANTABLE): HCPCS | Performed by: ORTHOPAEDIC SURGERY

## 2023-10-11 PROCEDURE — 87081 CULTURE SCREEN ONLY: CPT | Mod: CMCLAB,GEALAB | Performed by: NURSE PRACTITIONER

## 2023-10-11 PROCEDURE — 36415 COLL VENOUS BLD VENIPUNCTURE: CPT | Performed by: NURSE PRACTITIONER

## 2023-10-11 PROCEDURE — 3600000005 HC OR TIME - INITIAL BASE CHARGE - PROCEDURE LEVEL FIVE: Performed by: ORTHOPAEDIC SURGERY

## 2023-10-11 PROCEDURE — 2500000001 HC RX 250 WO HCPCS SELF ADMINISTERED DRUGS (ALT 637 FOR MEDICARE OP): Performed by: INTERNAL MEDICINE

## 2023-10-11 PROCEDURE — 80048 BASIC METABOLIC PNL TOTAL CA: CPT | Performed by: NURSE PRACTITIONER

## 2023-10-11 PROCEDURE — 3700000002 HC GENERAL ANESTHESIA TIME - EACH INCREMENTAL 1 MINUTE: Performed by: ORTHOPAEDIC SURGERY

## 2023-10-11 PROCEDURE — 7100000001 HC RECOVERY ROOM TIME - INITIAL BASE CHARGE: Performed by: ORTHOPAEDIC SURGERY

## 2023-10-11 PROCEDURE — 99232 SBSQ HOSP IP/OBS MODERATE 35: CPT | Performed by: NURSE PRACTITIONER

## 2023-10-11 PROCEDURE — 72170 X-RAY EXAM OF PELVIS: CPT | Performed by: RADIOLOGY

## 2023-10-11 PROCEDURE — 27236 TREAT THIGH FRACTURE: CPT | Performed by: ORTHOPAEDIC SURGERY

## 2023-10-11 PROCEDURE — 7100000002 HC RECOVERY ROOM TIME - EACH INCREMENTAL 1 MINUTE: Performed by: ORTHOPAEDIC SURGERY

## 2023-10-11 PROCEDURE — 2580000001 HC RX 258 IV SOLUTIONS: Performed by: ANESTHESIOLOGY

## 2023-10-11 PROCEDURE — 2780000003 HC OR 278 NO HCPCS: Performed by: ORTHOPAEDIC SURGERY

## 2023-10-11 PROCEDURE — 85027 COMPLETE CBC AUTOMATED: CPT | Performed by: NURSE PRACTITIONER

## 2023-10-11 PROCEDURE — 2500000004 HC RX 250 GENERAL PHARMACY W/ HCPCS (ALT 636 FOR OP/ED): Performed by: NURSE ANESTHETIST, CERTIFIED REGISTERED

## 2023-10-11 PROCEDURE — 2500000001 HC RX 250 WO HCPCS SELF ADMINISTERED DRUGS (ALT 637 FOR MEDICARE OP): Performed by: ORTHOPAEDIC SURGERY

## 2023-10-11 PROCEDURE — 3600000010 HC OR TIME - EACH INCREMENTAL 1 MINUTE - PROCEDURE LEVEL FIVE: Performed by: ORTHOPAEDIC SURGERY

## 2023-10-11 PROCEDURE — A6213 FOAM DRG >16<=48 SQ IN W/BDR: HCPCS | Performed by: ORTHOPAEDIC SURGERY

## 2023-10-11 PROCEDURE — 2720000007 HC OR 272 NO HCPCS: Performed by: ORTHOPAEDIC SURGERY

## 2023-10-11 PROCEDURE — 72170 X-RAY EXAM OF PELVIS: CPT | Mod: FY

## 2023-10-11 DEVICE — DISTAL SPACER
Type: IMPLANTABLE DEVICE | Site: HIP | Status: FUNCTIONAL
Brand: ACCOLADE

## 2023-10-11 DEVICE — TOBRA FULL DOSE ANTIBIOTIC BONE CEMENT, 10 PACK CATALOG NUMBER IS 6197-9-010
Type: IMPLANTABLE DEVICE | Site: HIP | Status: FUNCTIONAL
Brand: SIMPLEX

## 2023-10-11 DEVICE — BIPOLAR COMPONENT
Type: IMPLANTABLE DEVICE | Site: HIP | Status: FUNCTIONAL
Brand: UHR

## 2023-10-11 DEVICE — 132 DEGREE CEMENTED HIP STEM
Type: IMPLANTABLE DEVICE | Site: HIP | Status: FUNCTIONAL
Brand: ACCOLADE

## 2023-10-11 DEVICE — V40 FEMORAL HEAD
Type: IMPLANTABLE DEVICE | Site: HIP | Status: FUNCTIONAL
Brand: V40 HEAD

## 2023-10-11 RX ORDER — ATROPINE SULFATE 0.4 MG/ML
INJECTION, SOLUTION ENDOTRACHEAL; INTRAMEDULLARY; INTRAMUSCULAR; INTRAVENOUS; SUBCUTANEOUS AS NEEDED
Status: DISCONTINUED | OUTPATIENT
Start: 2023-10-11 | End: 2023-10-11

## 2023-10-11 RX ORDER — ALBUMIN HUMAN 50 G/1000ML
SOLUTION INTRAVENOUS AS NEEDED
Status: DISCONTINUED | OUTPATIENT
Start: 2023-10-11 | End: 2023-10-11

## 2023-10-11 RX ORDER — ALBUTEROL SULFATE 0.83 MG/ML
2.5 SOLUTION RESPIRATORY (INHALATION) ONCE AS NEEDED
Status: DISCONTINUED | OUTPATIENT
Start: 2023-10-11 | End: 2023-10-11 | Stop reason: HOSPADM

## 2023-10-11 RX ORDER — CEFAZOLIN SODIUM 2 G/100ML
2 INJECTION, SOLUTION INTRAVENOUS EVERY 8 HOURS
Status: COMPLETED | OUTPATIENT
Start: 2023-10-11 | End: 2023-10-12

## 2023-10-11 RX ORDER — ROCURONIUM BROMIDE 10 MG/ML
INJECTION, SOLUTION INTRAVENOUS AS NEEDED
Status: DISCONTINUED | OUTPATIENT
Start: 2023-10-11 | End: 2023-10-11

## 2023-10-11 RX ORDER — SODIUM CHLORIDE, SODIUM LACTATE, POTASSIUM CHLORIDE, CALCIUM CHLORIDE 600; 310; 30; 20 MG/100ML; MG/100ML; MG/100ML; MG/100ML
100 INJECTION, SOLUTION INTRAVENOUS CONTINUOUS
Status: DISCONTINUED | OUTPATIENT
Start: 2023-10-11 | End: 2023-10-11 | Stop reason: HOSPADM

## 2023-10-11 RX ORDER — PROPOFOL 10 MG/ML
INJECTION, EMULSION INTRAVENOUS AS NEEDED
Status: DISCONTINUED | OUTPATIENT
Start: 2023-10-11 | End: 2023-10-11

## 2023-10-11 RX ORDER — PHENYLEPHRINE HCL IN 0.9% NACL 0.4MG/10ML
SYRINGE (ML) INTRAVENOUS AS NEEDED
Status: DISCONTINUED | OUTPATIENT
Start: 2023-10-11 | End: 2023-10-11

## 2023-10-11 RX ORDER — FENTANYL CITRATE 50 UG/ML
INJECTION, SOLUTION INTRAMUSCULAR; INTRAVENOUS AS NEEDED
Status: DISCONTINUED | OUTPATIENT
Start: 2023-10-11 | End: 2023-10-11

## 2023-10-11 RX ORDER — ENOXAPARIN SODIUM 100 MG/ML
30 INJECTION SUBCUTANEOUS EVERY 12 HOURS SCHEDULED
Status: DISCONTINUED | OUTPATIENT
Start: 2023-10-11 | End: 2023-10-13 | Stop reason: HOSPADM

## 2023-10-11 RX ORDER — CEFAZOLIN 1 G/1
INJECTION, POWDER, FOR SOLUTION INTRAVENOUS AS NEEDED
Status: DISCONTINUED | OUTPATIENT
Start: 2023-10-11 | End: 2023-10-11

## 2023-10-11 RX ORDER — ONDANSETRON HYDROCHLORIDE 2 MG/ML
8 INJECTION, SOLUTION INTRAVENOUS ONCE
Status: DISCONTINUED | OUTPATIENT
Start: 2023-10-11 | End: 2023-10-11 | Stop reason: HOSPADM

## 2023-10-11 RX ORDER — ACETAMINOPHEN 325 MG/1
650 TABLET ORAL EVERY 4 HOURS PRN
Status: DISCONTINUED | OUTPATIENT
Start: 2023-10-11 | End: 2023-10-11 | Stop reason: HOSPADM

## 2023-10-11 RX ADMIN — ATORVASTATIN CALCIUM 40 MG: 40 TABLET, FILM COATED ORAL at 10:32

## 2023-10-11 RX ADMIN — DOCUSATE SODIUM 100 MG: 100 CAPSULE, LIQUID FILLED ORAL at 10:31

## 2023-10-11 RX ADMIN — Medication 10 MCG: at 13:07

## 2023-10-11 RX ADMIN — Medication 1 TABLET: at 09:00

## 2023-10-11 RX ADMIN — ONDANSETRON 4 MG: 2 INJECTION INTRAMUSCULAR; INTRAVENOUS at 13:51

## 2023-10-11 RX ADMIN — GUAIFENESIN 600 MG: 600 TABLET ORAL at 10:35

## 2023-10-11 RX ADMIN — ROCURONIUM BROMIDE 25 MG: 10 INJECTION, SOLUTION INTRAVENOUS at 12:54

## 2023-10-11 RX ADMIN — MULTIPLE VITAMINS W/ MINERALS TAB 1 TABLET: TAB at 10:31

## 2023-10-11 RX ADMIN — FENTANYL CITRATE 25 MCG: 50 INJECTION, SOLUTION INTRAMUSCULAR; INTRAVENOUS at 13:40

## 2023-10-11 RX ADMIN — Medication 2 UNITS: at 14:19

## 2023-10-11 RX ADMIN — ALBUMIN (HUMAN) 250 ML: 12.5 INJECTION, SOLUTION INTRAVENOUS at 14:34

## 2023-10-11 RX ADMIN — PROPOFOL 50 MG: 10 INJECTION, EMULSION INTRAVENOUS at 12:54

## 2023-10-11 RX ADMIN — ONDANSETRON 4 MG: 2 INJECTION INTRAMUSCULAR; INTRAVENOUS at 16:51

## 2023-10-11 RX ADMIN — METOPROLOL TARTRATE 50 MG: 50 TABLET, FILM COATED ORAL at 10:32

## 2023-10-11 RX ADMIN — CEFAZOLIN SODIUM 2 G: 1 POWDER, FOR SOLUTION INTRAMUSCULAR; INTRAVENOUS at 13:14

## 2023-10-11 RX ADMIN — Medication 80 MCG: at 14:19

## 2023-10-11 RX ADMIN — ENOXAPARIN SODIUM 30 MG: 30 INJECTION SUBCUTANEOUS at 21:11

## 2023-10-11 RX ADMIN — ASPIRIN 81 MG: 81 TABLET, COATED ORAL at 10:32

## 2023-10-11 RX ADMIN — SUGAMMADEX 200 MG: 100 INJECTION, SOLUTION INTRAVENOUS at 14:59

## 2023-10-11 RX ADMIN — FENTANYL CITRATE 25 MCG: 50 INJECTION, SOLUTION INTRAMUSCULAR; INTRAVENOUS at 15:00

## 2023-10-11 RX ADMIN — ATROPINE SULFATE 0.4 MG: 0.4 INJECTION, SOLUTION INTRAMUSCULAR; INTRAVENOUS; SUBCUTANEOUS at 13:05

## 2023-10-11 RX ADMIN — SODIUM CHLORIDE 60 ML/HR: 4.5 INJECTION, SOLUTION INTRAVENOUS at 05:22

## 2023-10-11 RX ADMIN — Medication: at 15:15

## 2023-10-11 RX ADMIN — Medication 100 MG: at 10:31

## 2023-10-11 RX ADMIN — Medication 8 UNITS: at 13:15

## 2023-10-11 RX ADMIN — TRAZODONE HYDROCHLORIDE 25 MG: 50 TABLET ORAL at 21:10

## 2023-10-11 RX ADMIN — SODIUM CHLORIDE, SODIUM LACTATE, POTASSIUM CHLORIDE, AND CALCIUM CHLORIDE: 600; 310; 30; 20 INJECTION, SOLUTION INTRAVENOUS at 12:47

## 2023-10-11 RX ADMIN — Medication 5000 UNITS: at 10:32

## 2023-10-11 RX ADMIN — FENTANYL CITRATE 25 MCG: 50 INJECTION, SOLUTION INTRAMUSCULAR; INTRAVENOUS at 12:54

## 2023-10-11 RX ADMIN — SODIUM CHLORIDE, POTASSIUM CHLORIDE, SODIUM LACTATE AND CALCIUM CHLORIDE 100 ML/HR: 600; 310; 30; 20 INJECTION, SOLUTION INTRAVENOUS at 16:51

## 2023-10-11 RX ADMIN — THIAMINE HCL TAB 100 MG 100 MG: 100 TAB at 10:31

## 2023-10-11 RX ADMIN — Medication 4 UNITS: at 12:58

## 2023-10-11 RX ADMIN — DOCUSATE SODIUM 100 MG: 100 CAPSULE, LIQUID FILLED ORAL at 21:10

## 2023-10-11 RX ADMIN — Medication 2 L/MIN: at 18:30

## 2023-10-11 RX ADMIN — Medication 1 TABLET: at 10:30

## 2023-10-11 RX ADMIN — CEFAZOLIN SODIUM 2 G: 2 INJECTION, SOLUTION INTRAVENOUS at 18:21

## 2023-10-11 RX ADMIN — Medication 2 UNITS: at 14:40

## 2023-10-11 RX ADMIN — DONEPEZIL HYDROCHLORIDE 5 MG: 5 TABLET ORAL at 21:14

## 2023-10-11 RX ADMIN — Medication 100 MCG: at 13:03

## 2023-10-11 SDOH — HEALTH STABILITY: MENTAL HEALTH: CURRENT SMOKER: 0

## 2023-10-11 ASSESSMENT — ACTIVITIES OF DAILY LIVING (ADL): LACK_OF_TRANSPORTATION: NO

## 2023-10-11 ASSESSMENT — COGNITIVE AND FUNCTIONAL STATUS - GENERAL
TURNING FROM BACK TO SIDE WHILE IN FLAT BAD: TOTAL
MOVING TO AND FROM BED TO CHAIR: TOTAL
TOILETING: TOTAL
DRESSING REGULAR UPPER BODY CLOTHING: TOTAL
TURNING FROM BACK TO SIDE WHILE IN FLAT BAD: TOTAL
STANDING UP FROM CHAIR USING ARMS: TOTAL
STANDING UP FROM CHAIR USING ARMS: TOTAL
CLIMB 3 TO 5 STEPS WITH RAILING: TOTAL
CLIMB 3 TO 5 STEPS WITH RAILING: TOTAL
HELP NEEDED FOR BATHING: TOTAL
EATING MEALS: TOTAL
DRESSING REGULAR LOWER BODY CLOTHING: TOTAL
MOVING TO AND FROM BED TO CHAIR: TOTAL
DAILY ACTIVITIY SCORE: 6
MOVING FROM LYING ON BACK TO SITTING ON SIDE OF FLAT BED WITH BEDRAILS: TOTAL
PERSONAL GROOMING: TOTAL
PERSONAL GROOMING: A LOT
DRESSING REGULAR UPPER BODY CLOTHING: TOTAL
DAILY ACTIVITIY SCORE: 7
WALKING IN HOSPITAL ROOM: TOTAL
MOBILITY SCORE: 7
MOBILITY SCORE: 6
TOILETING: TOTAL
WALKING IN HOSPITAL ROOM: TOTAL
MOVING FROM LYING ON BACK TO SITTING ON SIDE OF FLAT BED WITH BEDRAILS: A LOT
HELP NEEDED FOR BATHING: TOTAL
EATING MEALS: TOTAL
DRESSING REGULAR LOWER BODY CLOTHING: TOTAL

## 2023-10-11 NOTE — ANESTHESIA POSTPROCEDURE EVALUATION
Patient: Nay Frost    Procedure Summary       Date: 10/11/23 Room / Location: [facility] OR 02 / Virtual [facility] OR    Anesthesia Start: 1247 Anesthesia Stop: 1519    Procedure: RIGHT PENELOPE HIP ARTHROPLASTY (Right: Hip) Diagnosis:       Closed right hip fracture, initial encounter (CMS/[facility])      (Closed right hip fracture, initial encounter (CMS/[facility]) [S72.001A])    Surgeons: Ronnell Terrell MD Responsible Provider: Gael Jeong MD    Anesthesia Type: general ASA Status: 4 - Emergent            Anesthesia Type: general    Vitals Value Taken Time   /63 10/11/23 1619   Temp 36.1 °C (97 °F) 10/11/23 1519   Pulse 65 10/11/23 1619   Resp 14 10/11/23 1619   SpO2 100 % 10/11/23 1619       Anesthesia Post Evaluation    Patient location during evaluation: PACU  Patient participation: complete - patient participated  Level of consciousness: awake  Pain management: adequate  Multimodal analgesia pain management approach  Airway patency: patent  Two or more strategies used to mitigate risk of obstructive sleep apnea  Cardiovascular status: acceptable  Respiratory status: acceptable  Hydration status: acceptable        No notable events documented.

## 2023-10-11 NOTE — ANESTHESIA PREPROCEDURE EVALUATION
Patient: Nay CASILLAS [facility]    Procedure Information       Date/Time: 10/11/23 0900    Procedure: RIGHT PENELOPE HIP ARTHROPLASTY (Right: Hip)    Location: [facility] PROC 1 / [facility] OR    Surgeons: Ronnell Terrell MD            Relevant Problems   Cardiovascular   (+) Hyperlipidemia   (+) Secondary hypertension      Neuro/Psych   (+) Nonintractable epilepsy without status epilepticus (CMS/HCC)   (+) Severe Alzheimer's dementia with mood disturbance (CMS/HCC)       Clinical information reviewed:   Tobacco  Allergies  Meds  Problems  Med Hx  Surg Hx   Fam Hx  Soc   Hx        NPO Detail:  NPO/Void Status  Date of Last Liquid: 10/10/23  Time of Last Liquid: 0000  Date of Last Solid: 10/10/23         Physical Exam    Airway  Mallampati: II     Cardiovascular - normal exam     Dental    Pulmonary    Abdominal          Anesthesia Plan    ASA 4 - emergent     general     The patient is not a current smoker.  Patient was not previously instructed to abstain from smoking on day of procedure.  Patient did not smoke on day of procedure.  Education provided regarding risk of obstructive sleep apnea.  intravenous induction   Anesthetic plan and risks discussed with patient.  Use of blood products discussed with patient who.    Plan discussed with CRNA.

## 2023-10-11 NOTE — PROGRESS NOTES
Nay Frost is a 83 y.o. female on day 1 of admission presenting with Closed right hip fracture, initial encounter (CMS/[facility]).    Subjective   Patient bit fork last night and bit off a portion of the fork.  Witnessed by nursing.  Surgery was notified and would like to perform EGD but staff unable to contact POA (daughter Zeenat).  Yesterday, I did speak with her daughter to update her on her mom's plan of care.  She was advised that her mom needed a hemiarthroplasty of her hip due to her fracture.  Daughter asked what type of insurance her mom had and how much the surgery would cost.  I advised her I did not know those answers but it was in her mom's best interest to have her fracture repaired.  The daughter then told me that she is out of the country and she is 8 hours ahead of us.  She then abruptly ended the call and turned her phone off.  Since then, several attempts have been made by nursing and other providers to obtain contact but phone is pushed straight to voicemail.          Objective     Physical Exam  HENT:      Head: Normocephalic and atraumatic.   Eyes:      Extraocular Movements: Extraocular movements intact.      Conjunctiva/sclera: Conjunctivae normal.      Pupils: Pupils are equal, round, and reactive to light.   Cardiovascular:      Rate and Rhythm: Normal rate and regular rhythm.      Pulses: Normal pulses.   Pulmonary:      Breath sounds: Normal breath sounds.   Abdominal:      General: Bowel sounds are normal.      Palpations: Abdomen is soft.   Musculoskeletal:      Comments: Decreased ROM to right hip due to pain, TTP over right lateral hip, 2+ DP/PT RLE, cap refill < 2 RLE, sensations intact distal to injury   Skin:     General: Skin is warm and dry.      Capillary Refill: Capillary refill takes less than 2 seconds.   Neurological:      General: No focal deficit present.      Mental Status: She is alert and oriented to person, place, and time.       Last Recorded Vitals  Blood  "pressure 129/63, pulse 72, temperature 36.4 °C (97.5 °F), temperature source Temporal, resp. rate 16, height 1.6 m (5' 3\"), weight 50.1 kg (110 lb 7.2 oz), SpO2 95 %.  Intake/Output last 3 Shifts:  I/O last 3 completed shifts:  In: 1000 (20 mL/kg) [IV Piggyback:1000]  Out: - (0 mL/kg)   Weight: 50.1 kg     Relevant Results            Scheduled medications  aspirin, 81 mg, oral, Daily  atorvastatin, 40 mg, oral, Daily  calcium carbonate-vitamin D3, 1 tablet, oral, Daily  cholecalciferol, 5,000 Units, oral, Daily  co-enzyme Q-10, 100 mg, oral, Daily  docusate sodium, 100 mg, oral, BID  donepezil, 5 mg, oral, Nightly  metoprolol tartrate, 50 mg, oral, Daily  multivitamin with minerals, 1 tablet, oral, Daily  thiamine, 100 mg, oral, Daily  traZODone, 25 mg, oral, Nightly      Continuous medications  sodium chloride, 60 mL/hr, Last Rate: 60 mL/hr (10/11/23 1247)      PRN medications  PRN medications: acetaminophen **OR** acetaminophen **OR** acetaminophen, alum-mag hydroxide-simeth, dextromethorphan-guaifenesin, guaiFENesin, morphine, ondansetron **OR** ondansetron, ondansetron      Results for orders placed or performed during the hospital encounter of 10/09/23 (from the past 24 hour(s))   CBC   Result Value Ref Range    WBC 9.3 4.4 - 11.3 x10*3/uL    nRBC 0.0 0.0 - 0.0 /100 WBCs    RBC 3.87 (L) 4.00 - 5.20 x10*6/uL    Hemoglobin 11.8 (L) 12.0 - 16.0 g/dL    Hematocrit 38.4 36.0 - 46.0 %    MCV 99 80 - 100 fL    MCH 30.5 26.0 - 34.0 pg    MCHC 30.7 (L) 32.0 - 36.0 g/dL    RDW 14.2 11.5 - 14.5 %    Platelets 99 (L) 150 - 450 x10*3/uL    MPV 12.1 (H) 7.5 - 11.5 fL   Basic Metabolic Panel   Result Value Ref Range    Glucose 132 (H) 74 - 99 mg/dL    Sodium 146 (H) 136 - 145 mmol/L    Potassium 4.3 3.5 - 5.3 mmol/L    Chloride 108 (H) 98 - 107 mmol/L    Bicarbonate 29 21 - 32 mmol/L    Anion Gap 13 10 - 20 mmol/L    Urea Nitrogen 30 (H) 6 - 23 mg/dL    Creatinine 0.90 0.50 - 1.05 mg/dL    eGFR 63 >60 mL/min/1.73m*2    " Calcium 9.3 8.6 - 10.3 mg/dL      Assessment/Plan   Principal Problem:    Closed right hip fracture, initial encounter (CMS/[facility])  Active Problems:    Foreign body in alimentary tract    83 year old female, fall with right hip fracture  - planning for OR today with Dr. Terrell for hemiarthroplasty  - post op labs tomorrow   - NPO   - MIVF  - pain control per primary  - PT/OT post-op  - will plan to return to [facility] [place]  - remainder of care per primary team.      Plan discussed with Dr. Terrell who agrees with above       I spent 30 minutes in the professional and overall care of this patient.    Bethanie Nuñez, APRN-CNP

## 2023-10-11 NOTE — TREATMENT PLAN
Multiple attempts made by myself and orthopedic surgical team to contact daughter (Zeenat) who is listed as POA for patient without response. I myself have called and left three messages for daughter asking for emergent response but now number is going directly to voicemail. Spoke with Dr. Terrell (orthopedic surgery) and we agree that having surgical correction urgently is in the best interest of the patient as we have already delayed surgery for 24 hours and further delay can cause increased mortality for patient. We both signed consent and will proceed to surgery later today.    Of note, patient is unable to consent for herself due to advanced dementia.

## 2023-10-11 NOTE — PROGRESS NOTES
Nay Frost is a 83 y.o. female on day 0 of admission presenting with Closed right hip fracture, initial encounter (CMS/[facility]).    Subjective   Called to assess pt since she bit down on her fork and swallow a piece while nurse was assisted with her feed. 1 cm of one prong and 2 cm from a second prong are missing. See picture in chart. Nursing notified surgery. Attempted to notify LESLIE Epperson but no answer and left a message to call floor asap. She has no complaints in the ROS.     Objective   Last Recorded Vitals  /87   Pulse 85   Temp 37 °C (98.6 °F) (Temporal)   Resp 18   Wt 50.1 kg (110 lb 7.2 oz)   SpO2 98%   Intake/Output last 3 Shifts:    Intake/Output Summary (Last 24 hours) at 10/10/2023 231  Last data filed at 10/10/2023 0440  Gross per 24 hour   Intake 1000 ml   Output --   Net 1000 ml     Admission Weight  Weight: 61 kg (134 lb 7.7 oz) (10/09/23 1824)    Daily Weight  10/10/23 : 50.1 kg (110 lb 7.2 oz)    Image Results  Awaiting KUB results.    ROS  Review of Systems -Limited d/t dementia/AMS  Constitutional:  Negative for chills, diaphoresis, fever and unexpected weight change.   Respiratory: Neg for cough, shortness of breath and wheezing.    Cardiovascular:  Negative for chest pain, leg swelling.   Gastrointestinal:  Negative for abdominal pain, nausea and vomiting.   Psychiatric/Behavioral: Positive for confusion (baseline). The patient is not nervous/anxious.      PE:  Constitutional:       Appearance: Normal appearance. She is normal weight. In no acute distress.Resting comfortably.  HENT:      Head: Normocephalic and atraumatic.      Mouth/Throat:      Mouth: Mucous membranes are moist.      Pharynx: Oropharynx is clear. No oropharyngeal exudate or posterior oropharyngeal erythema.   Neck: No JVD or use of accessory muscles.  Eyes:      Extraocular Movements: Extraocular movements intact.      Conjunctiva/sclera: Conjunctivae normal.   Cardiovascular:      Rate and Rhythm:  Normal rate and regular rhythm.      Pulses: Normal pulses.      Heart sounds: Normal heart sounds.   Pulmonary:      Effort: No respiratory distress.      Breath sounds: Negative for  wheezing or rhonchi. Negative for tachypnea & labored breathing, No cyanosis.     General: Bowel sounds are normal. There is no distension.      Palpations: Abdomen is soft.      Tenderness: There is no abdominal tenderness. There is no guarding or rebound.   Extremities: No swelling.   Skin:     Coloration: Skin is pale.   Neurological:      Mental Status: She is oriented to 0 (baseline).  Psychiatric:         Mood and Affect: Mood normal.         Behavior: Behavior normal. Pleasantly confused.      Assessment/Plan   Principal Problem:    Closed right hip fracture, initial encounter (CMS/[facility])  Active Problems:    Foreign body in alimentary tract  Attempted to call Zeenat daughter () regarding above and message left to call back asap (consent needed). Also left message with family friend listed as contact Deja Fountain to call back asap.  Surgery notified and aware and plans on a possible EGD tonight to remove foreign object, but consent needed first and not able to reach POA.  Keep NPO  KUB-f/u  Surgery consulted    DNR-CCA    Iris King, APRN-CNP

## 2023-10-11 NOTE — ANESTHESIA PROCEDURE NOTES
Airway  Date/Time: 10/11/2023 12:58 PM  Urgency: elective      Staffing  Performed: CRNA   Authorized by: Gael Jeong MD    Performed by: UMM Decker-SREEKANTH  Patient location during procedure: OR    Indications and Patient Condition  Indications for airway management: anesthesia  Sedation level: deep  Preoxygenated: yes  Mask difficulty assessment: 1 - vent by mask    Final Airway Details  Final airway type: endotracheal airway      Successful airway: ETT  Cuffed: yes   Successful intubation technique: video laryngoscopy  Facilitating devices/methods: intubating stylet  Endotracheal tube insertion site: oral  Blade size: #3  ETT size (mm): 7.5  Cormack-Lehane Classification: grade I - full view of glottis  Placement verified by: capnometry   Number of attempts at approach: 1  Ventilation between attempts: none  Number of other approaches attempted: 0

## 2023-10-11 NOTE — CARE PLAN
The patient's goals for the shift include  sleep    Problem: Pain - Adult  Goal: Verbalizes/displays adequate comfort level or baseline comfort level  Outcome: Progressing     Problem: Safety - Adult  Goal: Free from fall injury  Outcome: Progressing     Problem: Skin  Goal: Prevent/minimize sheer/friction injuries  Outcome: Progressing     Problem: Fall/Injury  Goal: Not fall by end of shift  Outcome: Progressing  Goal: Be free from injury by end of the shift  Outcome: Progressing       The clinical goals for the shift include to rest without discomfort    Over the shift, the patient was able to sleep for at least 5 hours. Patients VS remained stable. Patient accidentally swallowed a piece of a fork when eating. Dr. Marx was notified and the incident is being taken care of. Patient is resting quietly.

## 2023-10-11 NOTE — PROGRESS NOTES
Nay Frost is a 83 y.o. female on day 1 of admission presenting with Closed right hip fracture, initial encounter (CMS/[facility]).      Subjective   Patient appears uncomfortable       Objective     Last Recorded Vitals  /63 (BP Location: Left arm, Patient Position: Lying)   Pulse 65   Temp 36.1 °C (97 °F) (Temporal)   Resp 14   Wt 50.1 kg (110 lb 7.2 oz)   SpO2 100%   Intake/Output last 3 Shifts:    Intake/Output Summary (Last 24 hours) at 10/11/2023 1643  Last data filed at 10/11/2023 1505  Gross per 24 hour   Intake 760 ml   Output 100 ml   Net 660 ml         Admission Weight  Weight: 61 kg (134 lb 7.7 oz) (10/09/23 1824)    Daily Weight  10/10/23 : 50.1 kg (110 lb 7.2 oz)    Image Results  XR pelvis 1-2 views  Narrative: Interpreted By:  Kj Glover,   STUDY:  XR PELVIS 1-2 VIEWS;  10/11/2023 3:53 pm      INDICATION:  Signs/Symptoms:Post op hip.      COMPARISON:  None.      ACCESSION NUMBER(S):  [id number]      ORDERING CLINICIAN:  KIA RIVAS      FINDINGS:  Portable AP views of the pelvis demonstrates bilateral total hip  arthroplasties with intact alignment. Limited exam due to exclusion  of the superior aspect of the pelvis.      Impression: No acute fracture or dislocation.          Signed by: Kj Glover 10/11/2023 4:22 PM  Dictation workstation:   [id number]  XR abdomen 2 views supine and decubitus  Narrative: Interpreted By:  Heath Elizabeth,   STUDY:  XR ABDOMEN 2 VIEWS SUPINE AND DECUBITUS;  10/10/2023 10:48 pm      INDICATION:  Signs/Symptoms:swallow foreign object-fork prong.      COMPARISON:  Pelvis radiograph 10/9/2023      ACCESSION NUMBER(S):  [id number]      ORDERING CLINICIAN:  DARELL AYON      FINDINGS:  There is nonspecific nonobstructive bowel gas pattern. No evidence of  significant free abdominal air.      No evidence of radiopaque foreign body.      There is redemonstration of displaced fracture of the right femoral  neck. Left hip arthroplasty.       Impression: No evidence of radiopaque foreign body.      Nonspecific nonobstructive bowel gas pattern.      Stable displaced fracture of right femoral neck.      MACRO:  None      Signed by: Heath Elizabeth 10/11/2023 3:18 AM  Dictation workstation:   [id number]      Physical Exam  Constitutional:       Appearance: She is ill-appearing.   HENT:      Mouth/Throat:      Mouth: Mucous membranes are dry.   Eyes:      Extraocular Movements: Extraocular movements intact.   Cardiovascular:      Rate and Rhythm: Normal rate.   Musculoskeletal:         General: Swelling present.      Right lower leg: Edema present.   Skin:     Coloration: Skin is pale.   Neurological:      Mental Status: She is alert. She is disoriented.   Psychiatric:         Cognition and Memory: Cognition is impaired.         Relevant Results               Assessment/Plan                  Principal Problem:    Closed right hip fracture, initial encounter (CMS/[facility])  Active Problems:    Foreign body in alimentary tract    #Closed right hip fracture, initial encounter (CMS/[facility])  #Orthopedics consulted  Pain control  Although there is no evidence of pt being able to do >4METS, the absence of any structural heart disease or any active cardiac symptoms permits her to undergo surgery with acceptable cardiovascular  risk  Fall precaution  PT/OT eval    #Hypernatremia secondary to free water depletion  #Azotemia related to #2  Will volume expand with hypotonic IVF    #Advanced Alzheimer's Dementia  Will continue home medications     #DVT prophylaxis  Will discuss with ortho    Dispo: Discharge HH when medically ready.              UMM Conroy-CNP

## 2023-10-11 NOTE — PROGRESS NOTES
10/11/23 1007   Discharge Planning   Living Arrangements Other (Comment)  ([facility] long term)   Support Systems Children   Assistance Needed Call placed to [facility] at this time to obtain patient's baseline with no answer from the nurse.   Type of Residence Nursing home/residential care   Who is requesting discharge planning? Provider   Home or Post Acute Services Post acute facilities (Rehab/SNF/etc)   Type of Post Acute Facility Services Long term care   Patient expects to be discharged to: Plan is for patient to return to [facility] no pre-cert is needed.   Does the patient need discharge transport arranged? Yes   RoundTrip coordination needed? Yes   Has discharge transport been arranged? No   What day is the transport expected? 10/14/23   Financial Resource Strain   How hard is it for you to pay for the very basics like food, housing, medical care, and heating? Patient refu  (Patient lives in LTC facility)   Housing Stability   In the last 12 months, was there a time when you were not able to pay the mortgage or rent on time? DE  (Patient lives in LTC facility)   In the last 12 months, was there a time when you did not have a steady place to sleep or slept in a shelter (including now)? DE  (Patient lives in LTC facility)   Transportation Needs   In the past 12 months, has lack of transportation kept you from medical appointments or from getting medications? no   In the past 12 months, has lack of transportation kept you from meetings, work, or from getting things needed for daily living? No   Patient Choice   Patient / Family choosing to utilize agency / facility established prior to hospitalization Yes       10/12/2023 1040 LSW reached [facility] regarding this patient. LSW was told by [facility] the daughter prefers to be reached via Email rather then telephone because she is currently out of the country. Her Email is [email or web address]. Per [facility] daughter can also be  reached via text at [phone].

## 2023-10-11 NOTE — OP NOTE
Pre-Operative Diagnosis: right hip fracture  Post-Operative Diagnosis: same  Procedure: right hip hemiarthroplasty for femoral neck fracture  Surgeon: Xander  Assistant: Padmini  Anesthesia: General  Complications: none  Estimated blood loss: 100 mL  Specimen: none  Findings: Hematoma suggestive of subacute fracture  Implants:  Implants       Type Name Action Serial No.      Joint CEMENT, BONE SIMPLEX P W/TOBRA - [id number] Implanted      Joint CEMENT, BONE SIMPLEX P W/TOBRA - DANA - [id number] Implanted HI     Joint SPACER, DISTAL 13MM MED ACCOLADE - [id number] Implanted      Joint BIPOLAR, UHR, 26 X 46MM - [id number] Implanted      Joint HEAD, FEMUR V40 26/-3 COCR - [id number] Implanted      Joint STEM, FEMUR 132D SZ 4 137MM ACCOLADE C - [id number] Implanted             Disposition: Good/PACU      Indications: Patient presented to the hospital and found to have displaced femoral neck fracture.  Patient's power of  was not reachable for formal consent after multiple attempts.  Because of this discussion with hospitalist team was undertaken with review of the risks, benefits and alternatives and decision made to proceed with operative intervention, as indicated.      Operative course: Patient was greeted in the preoperative holding area and the operative site was marked with indelible marker. They were brought back to the operating room suite where general anesthetic was induced by the anesthesia team. Patient was then positioned in a lateral position with the affected side up.   Lower extremity was then prepped and draped in standard sterile fashion and timeout procedure was performed as per standard protocol. A curvilinear posterolateral incision was made centered overlying the greater trochanter. Dissection was carried down to the fascia which was divided in line with the incision. Hemostasis obtained with electrocautery. A Charnley retractor was placed, taking care to protect the underlying sciatic nerve. The  trochanteric bursa was reflected posteriorly and the short external rotators were identified and released off of their femoral attachments.  0 Vicryl tag sutures were placed into the short external rotators while carefully protecting the nearby sciatic nerve.  A revision femoral neck cut was then performed while protecting the surrounding soft tissues. Following this the femoral head was removed with the assistance of the corkscrew device. The acetabulum was confirmed to be clear of debris and trial heads were placed until appropriate size was determined. Attention was then taken to the preparation of the femoral canal. A box cutting osteotome was used to lateralize the insertion site. Canal finder was placed and following this canal was sequentially broached to an appropriate size and a trial reduction was performed. The hip had excellent stability as well as appropriate soft tissue tensioning. The trial implants were removed and canal was prepped for cementing. Distal cement restrictor was placed and implant was inserted once cement was placed. After cement hardened a bipolar head was attached and hip was once again reduced. The hip was confirmed to be stable in the position of sleep as well as with the hip flexed to 90° with over 60° internal rotation. Soft tissue tensions were appropriate. Wound was copiously irrigated and Betadine soak was performed as per standard protocol. Wound was again irrigated and following this short external rotators were repaired to the greater trochanter with nonabsorbable suture. Fascia was then reapproximated with 0 Vicryl in a figure-of-eight fashion. 2-0 Monocryl was utilized for a buried interrupted stitch for the skin.  3-0 V-Loc subcuticular stitch was used for final skin closure followed by Exofin mesh dressing on the skin.  Patient was moved to their hospital bed and awoken from anesthesia uneventfully.    Patient will be weightbearing as tolerated with posterior hip  precautions. They will remain on DVT prophylaxis for at least 6 weeks postoperatively.  Plan for follow-up radiographs at 6 week postoperative appointment.

## 2023-10-11 NOTE — CONSULTS
Reason For Consult  Swallowed foreign body    History Of Present Illness  Nay Frost is a 83 y.o. female with PMH of dementia who is admitted currently for a hip fracture on the right. [facility] her [facility]. She is unable to provide any history 2/2 dementia. During her hospitalization, she evidently bit down on a plastic fork while being fed and broke off two prongs. Before the plastic could be fished out of her mouth, she swallowed the plastic. She denies abdominal pain today and is nontender on exam. She is currently NPO for operative fixation of the right hip today. She was going to have an EGD last night, although the daughter POA is apparently out of the country and hung up/proceeded to not accept any phone calls from providers. Consent therefore was not obtained in a timely fashion and EGD was unable to be performed.     Past Medical History  She has a past medical history of Alzheimer's disease (CMS/[facility]), Amnesia, COVID-19, Crohn's disease (CMS/[facility]), Dementia (CMS/[facility]), Hip fracture, right (CMS/[facility]) (10/09/2023), Hyperlipidemia, Hypertension, Insomnia, Migraine, Osteoporosis, and Seizures (CMS/[facility]).    Surgical History  She has no past surgical history on file.     Social History  She reports that she does not currently use alcohol. She reports that she does not currently use drugs. No history on file for tobacco use.    Family History  No family history on file.     Allergies  Bee pollen, Erythromycin, [place] flavor, Iodine, Pneumococcal vaccine, Shellfish derived, Strawberry, Sulfa (sulfonamide antibiotics), and Tree nuts    Review of Systems  A complete 10 point review of systems was performed and is negative except as noted in the history of present illness.       Physical Exam  Constitutional: No acute distress, sitting up in bed.  Neuro: Alert, oriented x 0. Follows some commands.   Eyes: EOMI. No scleral icterus. Conjunctiva pink.  ENT: MMM.   Heart: Regular rate.  Respiratory: No increased  "work of breathing or audible wheeze.  Abdomen: Soft, nondistended.  Nontender.  MSK: Limited ROM right hip.  Vascular: Palpable pulses throughout, no pitting edema.  Skin: No rashes.   Psychological: Appropriate mood and behavior.       Last Recorded Vitals  Blood pressure 132/54, pulse 64, temperature 35.9 °C (96.6 °F), resp. rate 16, height 1.6 m (5' 3\"), weight 50.1 kg (110 lb 7.2 oz), SpO2 97 %.    Relevant Results  .  Results for orders placed or performed during the hospital encounter of 10/09/23 (from the past 24 hour(s))   CBC   Result Value Ref Range    WBC 9.3 4.4 - 11.3 x10*3/uL    nRBC 0.0 0.0 - 0.0 /100 WBCs    RBC 3.87 (L) 4.00 - 5.20 x10*6/uL    Hemoglobin 11.8 (L) 12.0 - 16.0 g/dL    Hematocrit 38.4 36.0 - 46.0 %    MCV 99 80 - 100 fL    MCH 30.5 26.0 - 34.0 pg    MCHC 30.7 (L) 32.0 - 36.0 g/dL    RDW 14.2 11.5 - 14.5 %    Platelets 99 (L) 150 - 450 x10*3/uL    MPV 12.1 (H) 7.5 - 11.5 fL   Basic Metabolic Panel   Result Value Ref Range    Glucose 132 (H) 74 - 99 mg/dL    Sodium 146 (H) 136 - 145 mmol/L    Potassium 4.3 3.5 - 5.3 mmol/L    Chloride 108 (H) 98 - 107 mmol/L    Bicarbonate 29 21 - 32 mmol/L    Anion Gap 13 10 - 20 mmol/L    Urea Nitrogen 30 (H) 6 - 23 mg/dL    Creatinine 0.90 0.50 - 1.05 mg/dL    eGFR 63 >60 mL/min/1.73m*2    Calcium 9.3 8.6 - 10.3 mg/dL        XR abdomen 2 views supine and decubitus    Result Date: 10/11/2023    No evidence of radiopaque foreign body.   Nonspecific nonobstructive bowel gas pattern.   Stable displaced fracture of right femoral neck.   MACRO: None   Signed by: Heath Elizabeth 10/11/2023 3:18 AM Dictation workstation:   [id number]       Assessment/Plan   83 year old demented female who is admitted for right hip fracture s/p fall at [facility]. Surgery consulted for foreign body ingestion after biting down on plastic fork while being fed last night. Swallowed 2 plastic prongs from the fork. Was planning EGD for possible retrieval last night, although POA could not " be contacted in order to receive consent in a timely fashion.     Will plan to monitor for abdominal symptoms during hospital stay. Cannot follow FB on imaging given it is not radiopaque.     Plan:  -- Advance diet as tolerated  -- Monitor for abdominal symptoms, blood in stool etc      Patient was seen by and discussed with  Dr. Marx who is in agreement with plan.      Francisca Delacruz PA-C    I personally obtained the key portions of the history and physical exam. I reviewed the documentation and discussed the patient with SHERRY/resident. I agree with the assessment and plan as documented in the note. I made necessary editing and addendum.  Was not able to reach family for consent for EGD last night   Two pieces of plastic fork prongs small: one about 1cm, the other one about 2cm. Likely she will be able to pass them with no problem.   Will monitor for now. No intervention is needed at this point.

## 2023-10-12 LAB
APPEARANCE UR: ABNORMAL
BACTERIA #/AREA URNS AUTO: ABNORMAL /HPF
BILIRUB UR STRIP.AUTO-MCNC: NEGATIVE MG/DL
COLOR UR: YELLOW
GLUCOSE UR STRIP.AUTO-MCNC: NEGATIVE MG/DL
HYALINE CASTS #/AREA URNS AUTO: ABNORMAL /LPF
KETONES UR STRIP.AUTO-MCNC: ABNORMAL MG/DL
LEUKOCYTE ESTERASE UR QL STRIP.AUTO: ABNORMAL
NITRITE UR QL STRIP.AUTO: POSITIVE
PH UR STRIP.AUTO: 5 [PH]
PROT UR STRIP.AUTO-MCNC: ABNORMAL MG/DL
RBC # UR STRIP.AUTO: NEGATIVE /UL
RBC #/AREA URNS AUTO: ABNORMAL /HPF
SP GR UR STRIP.AUTO: 1.02
SQUAMOUS #/AREA URNS AUTO: ABNORMAL /HPF
UROBILINOGEN UR STRIP.AUTO-MCNC: <2 MG/DL
WBC #/AREA URNS AUTO: ABNORMAL /HPF

## 2023-10-12 PROCEDURE — 2500000004 HC RX 250 GENERAL PHARMACY W/ HCPCS (ALT 636 FOR OP/ED): Performed by: NURSE PRACTITIONER

## 2023-10-12 PROCEDURE — 97166 OT EVAL MOD COMPLEX 45 MIN: CPT | Mod: GO

## 2023-10-12 PROCEDURE — 81001 URINALYSIS AUTO W/SCOPE: CPT | Performed by: NURSE PRACTITIONER

## 2023-10-12 PROCEDURE — 99232 SBSQ HOSP IP/OBS MODERATE 35: CPT | Performed by: NURSE PRACTITIONER

## 2023-10-12 PROCEDURE — 96372 THER/PROPH/DIAG INJ SC/IM: CPT | Performed by: ORTHOPAEDIC SURGERY

## 2023-10-12 PROCEDURE — 2500000004 HC RX 250 GENERAL PHARMACY W/ HCPCS (ALT 636 FOR OP/ED): Performed by: ORTHOPAEDIC SURGERY

## 2023-10-12 PROCEDURE — 97161 PT EVAL LOW COMPLEX 20 MIN: CPT | Mod: GP

## 2023-10-12 PROCEDURE — 2500000001 HC RX 250 WO HCPCS SELF ADMINISTERED DRUGS (ALT 637 FOR MEDICARE OP): Performed by: ORTHOPAEDIC SURGERY

## 2023-10-12 PROCEDURE — 1100000001 HC PRIVATE ROOM DAILY

## 2023-10-12 RX ORDER — CEFAZOLIN SODIUM 2 G/100ML
2 INJECTION, SOLUTION INTRAVENOUS EVERY 8 HOURS
Status: COMPLETED | OUTPATIENT
Start: 2023-10-12 | End: 2023-10-13

## 2023-10-12 RX ADMIN — DOCUSATE SODIUM 100 MG: 100 CAPSULE, LIQUID FILLED ORAL at 09:34

## 2023-10-12 RX ADMIN — DONEPEZIL HYDROCHLORIDE 5 MG: 5 TABLET ORAL at 21:38

## 2023-10-12 RX ADMIN — METOPROLOL TARTRATE 50 MG: 50 TABLET, FILM COATED ORAL at 09:33

## 2023-10-12 RX ADMIN — THIAMINE HCL TAB 100 MG 100 MG: 100 TAB at 09:33

## 2023-10-12 RX ADMIN — ATORVASTATIN CALCIUM 40 MG: 40 TABLET, FILM COATED ORAL at 09:33

## 2023-10-12 RX ADMIN — MULTIPLE VITAMINS W/ MINERALS TAB 1 TABLET: TAB at 09:34

## 2023-10-12 RX ADMIN — Medication 2 L/MIN: at 10:00

## 2023-10-12 RX ADMIN — ACETAMINOPHEN 650 MG: 325 TABLET ORAL at 09:34

## 2023-10-12 RX ADMIN — DOCUSATE SODIUM 100 MG: 100 CAPSULE, LIQUID FILLED ORAL at 21:38

## 2023-10-12 RX ADMIN — ENOXAPARIN SODIUM 30 MG: 30 INJECTION SUBCUTANEOUS at 21:40

## 2023-10-12 RX ADMIN — CEFAZOLIN SODIUM 2 G: 2 INJECTION, SOLUTION INTRAVENOUS at 13:50

## 2023-10-12 RX ADMIN — ASPIRIN 81 MG: 81 TABLET, COATED ORAL at 09:33

## 2023-10-12 RX ADMIN — ENOXAPARIN SODIUM 30 MG: 30 INJECTION SUBCUTANEOUS at 09:34

## 2023-10-12 RX ADMIN — CEFAZOLIN SODIUM 2 G: 2 INJECTION, SOLUTION INTRAVENOUS at 04:04

## 2023-10-12 RX ADMIN — Medication 5000 UNITS: at 09:33

## 2023-10-12 RX ADMIN — Medication 100 MG: at 09:34

## 2023-10-12 RX ADMIN — SODIUM CHLORIDE 60 ML/HR: 4.5 INJECTION, SOLUTION INTRAVENOUS at 09:37

## 2023-10-12 RX ADMIN — TRAZODONE HYDROCHLORIDE 25 MG: 50 TABLET ORAL at 21:38

## 2023-10-12 RX ADMIN — Medication 1 TABLET: at 09:34

## 2023-10-12 RX ADMIN — CEFAZOLIN SODIUM 2 G: 2 INJECTION, SOLUTION INTRAVENOUS at 21:38

## 2023-10-12 ASSESSMENT — COGNITIVE AND FUNCTIONAL STATUS - GENERAL
DRESSING REGULAR LOWER BODY CLOTHING: TOTAL
HELP NEEDED FOR BATHING: TOTAL
TOILETING: A LOT
DRESSING REGULAR UPPER BODY CLOTHING: TOTAL
DRESSING REGULAR LOWER BODY CLOTHING: TOTAL
TURNING FROM BACK TO SIDE WHILE IN FLAT BAD: A LOT
WALKING IN HOSPITAL ROOM: TOTAL
MOVING FROM LYING ON BACK TO SITTING ON SIDE OF FLAT BED WITH BEDRAILS: TOTAL
EATING MEALS: TOTAL
DRESSING REGULAR UPPER BODY CLOTHING: TOTAL
TURNING FROM BACK TO SIDE WHILE IN FLAT BAD: A LOT
MOBILITY SCORE: 8
TOILETING: A LOT
PERSONAL GROOMING: TOTAL
CLIMB 3 TO 5 STEPS WITH RAILING: TOTAL
MOVING FROM LYING ON BACK TO SITTING ON SIDE OF FLAT BED WITH BEDRAILS: A LOT
TOILETING: TOTAL
CLIMB 3 TO 5 STEPS WITH RAILING: TOTAL
STANDING UP FROM CHAIR USING ARMS: TOTAL
EATING MEALS: TOTAL
DAILY ACTIVITIY SCORE: 7
DRESSING REGULAR UPPER BODY CLOTHING: TOTAL
MOBILITY SCORE: 8
EATING MEALS: A LOT
DAILY ACTIVITIY SCORE: 6
STANDING UP FROM CHAIR USING ARMS: TOTAL
HELP NEEDED FOR BATHING: TOTAL
MOVING TO AND FROM BED TO CHAIR: TOTAL
PERSONAL GROOMING: TOTAL
DAILY ACTIVITIY SCORE: 8
WALKING IN HOSPITAL ROOM: TOTAL
PERSONAL GROOMING: TOTAL
CLIMB 3 TO 5 STEPS WITH RAILING: TOTAL
HELP NEEDED FOR BATHING: TOTAL
MOVING TO AND FROM BED TO CHAIR: TOTAL
TURNING FROM BACK TO SIDE WHILE IN FLAT BAD: TOTAL
STANDING UP FROM CHAIR USING ARMS: TOTAL
MOVING FROM LYING ON BACK TO SITTING ON SIDE OF FLAT BED WITH BEDRAILS: A LOT
WALKING IN HOSPITAL ROOM: TOTAL
MOVING TO AND FROM BED TO CHAIR: TOTAL
MOBILITY SCORE: 6
DRESSING REGULAR LOWER BODY CLOTHING: TOTAL

## 2023-10-12 ASSESSMENT — PAIN SCALES - WONG BAKER
WONGBAKER_NUMERICALRESPONSE: NO HURT
WONGBAKER_NUMERICALRESPONSE: HURTS LITTLE BIT

## 2023-10-12 ASSESSMENT — PAIN SCALES - PAIN ASSESSMENT IN ADVANCED DEMENTIA (PAINAD)
FACIALEXPRESSION: SMILING OR INEXPRESSIVE
BODYLANGUAGE: RELAXED
FACIALEXPRESSION: SMILING OR INEXPRESSIVE
TOTALSCORE: 0
BODYLANGUAGE: TENSE, DISTRESSED PACING, FIDGETING
BODYLANGUAGE: TENSE, DISTRESSED PACING, FIDGETING
TOTALSCORE: 0
NEGVOCALIZATION: OCCASIONAL MOAN/GROAN, LOW SPEECH, NEGATIVE/DISAPPROVING QUALITY
BODYLANGUAGE: OCCASIONAL MOAN/GROAN, LOW SPEECH, NEGATIVE/DISAPPROVING QUALITY
NEGVOCALIZATION: OCCASIONAL MOAN/GROAN, LOW SPEECH, NEGATIVE/DISAPPROVING QUALITY
BODYLANGUAGE: TENSE, DISTRESSED PACING, FIDGETING
CONSOLABILITY: NO NEED TO CONSOLE
FACIALEXPRESSION: SMILING OR INEXPRESSIVE
CONSOLABILITY: NO NEED TO CONSOLE
BREATHING: NORMAL
FACIALEXPRESSION: SMILING OR INEXPRESSIVE
BREATHING: NORMAL
FACIALEXPRESSION: SAD, FRIGHTENED, FROWN
BODYLANGUAGE: RELAXED
TOTALSCORE: REPOSITIONED

## 2023-10-12 ASSESSMENT — PAIN - FUNCTIONAL ASSESSMENT
PAIN_FUNCTIONAL_ASSESSMENT: PAINAD (PAIN ASSESSMENT IN ADVANCED DEMENTIA SCALE)
PAIN_FUNCTIONAL_ASSESSMENT: WONG-BAKER FACES

## 2023-10-12 NOTE — PROGRESS NOTES
Nay Frost is a 83 y.o. female on day 2 of admission presenting with Closed right hip fracture, initial encounter (CMS/[facility]).      Subjective   Patient appears uncomfortable       Objective     Last Recorded Vitals  BP (!) 102/46   Pulse 102   Temp 36 °C (96.8 °F)   Resp 16   Wt 50.1 kg (110 lb 7.2 oz)   SpO2 (!) 74%   Intake/Output last 3 Shifts:    Intake/Output Summary (Last 24 hours) at 10/12/2023 1607  Last data filed at 10/12/2023 1138  Gross per 24 hour   Intake --   Output 1280 ml   Net -1280 ml         Admission Weight  Weight: 61 kg (134 lb 7.7 oz) (10/09/23 1824)    Daily Weight  10/10/23 : 50.1 kg (110 lb 7.2 oz)    Image Results  XR pelvis 1-2 views  Narrative: Interpreted By:  Kj Glover,   STUDY:  XR PELVIS 1-2 VIEWS;  10/11/2023 3:53 pm      INDICATION:  Signs/Symptoms:Post op hip.      COMPARISON:  None.      ACCESSION NUMBER(S):  [id number]      ORDERING CLINICIAN:  KIA RIVAS      FINDINGS:  Portable AP views of the pelvis demonstrates bilateral total hip  arthroplasties with intact alignment. Limited exam due to exclusion  of the superior aspect of the pelvis.      Impression: No acute fracture or dislocation.          Signed by: Kj Glover 10/11/2023 4:22 PM  Dictation workstation:   [id number]  XR abdomen 2 views supine and decubitus  Narrative: Interpreted By:  Heath Elizabeth,   STUDY:  XR ABDOMEN 2 VIEWS SUPINE AND DECUBITUS;  10/10/2023 10:48 pm      INDICATION:  Signs/Symptoms:swallow foreign object-fork prong.      COMPARISON:  Pelvis radiograph 10/9/2023      ACCESSION NUMBER(S):  [id number]      ORDERING CLINICIAN:  DARELL AYON      FINDINGS:  There is nonspecific nonobstructive bowel gas pattern. No evidence of  significant free abdominal air.      No evidence of radiopaque foreign body.      There is redemonstration of displaced fracture of the right femoral  neck. Left hip arthroplasty.      Impression: No evidence of radiopaque foreign body.       Nonspecific nonobstructive bowel gas pattern.      Stable displaced fracture of right femoral neck.      MACRO:  None      Signed by: Heath Farleycher 10/11/2023 3:18 AM  Dictation workstation:   [id number]      Physical Exam  Constitutional:       Appearance: She is ill-appearing.   HENT:      Mouth/Throat:      Mouth: Mucous membranes are dry.   Eyes:      Extraocular Movements: Extraocular movements intact.   Cardiovascular:      Rate and Rhythm: Normal rate.   Musculoskeletal:         General: Swelling present.      Right lower leg: Edema present.   Skin:     Coloration: Skin is pale.   Neurological:      Mental Status: She is alert. She is disoriented.   Psychiatric:         Cognition and Memory: Cognition is impaired.         Relevant Results  No current facility-administered medications on file prior to encounter.     Current Outpatient Medications on File Prior to Encounter   Medication Sig Dispense Refill    aspirin 81 mg EC tablet Take 1 tablet (81 mg) by mouth once daily.      atorvastatin (Lipitor) 40 mg tablet Take 1 tablet (40 mg) by mouth once daily.      bisacodyL 5 mg tablet Take 2 tablets (10 mg) by mouth once daily as needed for constipation. Do not crush, chew, or split.      calcium citrate-vitamin D2 250 mg-2.5 mcg (100 unit) tablet Take 1 tablet by mouth once daily.      cholecalciferol (Vitamin D-3) 5,000 Units tablet Take 1 tablet (5,000 Units) by mouth once daily.      co-enzyme Q-10 50 mg capsule Take 2 capsules (100 mg) by mouth once daily.      donepezil (Aricept) 5 mg tablet Take 1 tablet (5 mg) by mouth once daily at bedtime.      magnesium 250 mg tablet Take 1 tablet (250 mg) by mouth once daily.      magnesium hydroxide (Milk of Magnesia) 400 mg/5 mL suspension Take 30 mL by mouth once daily as needed for constipation.      metoprolol tartrate (Lopressor) 50 mg tablet Take 1 tablet by mouth once daily.      multivitamin capsule Take 1 capsule by mouth once daily.      sennosides  (Senokot) 8.6 mg tablet Take 1 tablet (8.6 mg) by mouth once daily.      thiamine 100 mg tablet Take 1 tablet (100 mg) by mouth once daily.      traZODone (Desyrel) 50 mg tablet Take 0.5 tablets (25 mg) by mouth once daily at bedtime.        Results for orders placed or performed during the hospital encounter of 10/09/23 (from the past 24 hour(s))   Urinalysis with Reflex Microscopic   Result Value Ref Range    Color, Urine Yellow Straw, Yellow    Appearance, Urine Hazy (N) Clear    Specific Gravity, Urine 1.021 1.005 - 1.035    pH, Urine 5.0 5.0, 5.5, 6.0, 6.5, 7.0, 7.5, 8.0    Protein, Urine 30 (1+) (N) NEGATIVE mg/dL    Glucose, Urine NEGATIVE NEGATIVE mg/dL    Blood, Urine NEGATIVE NEGATIVE    Ketones, Urine 5 (TRACE) (A) NEGATIVE mg/dL    Bilirubin, Urine NEGATIVE NEGATIVE    Urobilinogen, Urine <2.0 <2.0 mg/dL    Nitrite, Urine POSITIVE (A) NEGATIVE    Leukocyte Esterase, Urine MODERATE (2+) (A) NEGATIVE   Urinalysis Microscopic Only   Result Value Ref Range    WBC, Urine 21-50 (A) 1-5, NONE /HPF    RBC, Urine 1-2 NONE, 1-2, 3-5 /HPF    Squamous Epithelial Cells, Urine 1-9 (SPARSE) Reference range not established. /HPF    Bacteria, Urine 1+ (A) NONE SEEN /HPF    Hyaline Casts, Urine OCCASIONAL (A) NONE /LPF                   Assessment/Plan                  Principal Problem:    Closed right hip fracture, initial encounter (CMS/[facility])  Active Problems:    Foreign body in alimentary tract    #Closed right hip fracture, initial encounter (CMS/[facility])  Orthopedics following  Pain control  Although there is no evidence of pt being able to do >4METS, the absence of any structural heart disease or any active cardiac symptoms permits her to undergo surgery with acceptable cardiovascular  risk  Fall precautions  Will return to  once medically stable    #Urinary retention s/p anesthetic  #UTI  Place patient on the bedside commode if possible  Pending UC  Will continue to cefazolin   Bladder scan q 8 hours and straight  cath >400  Will try Flomax if not better  Strict I&O  Repeat RFP     #Hypernatremia secondary to free water depletion (improving)  #Azotemia related to #2  Will continue volume expansion with hypotonic IVF    #Advanced Alzheimer's Dementia  Will continue home medications     #DVT prophylaxis  Lovenox started    Dispo: Discharge HH when medically ready.              Michell Myers, APRN-CNP

## 2023-10-12 NOTE — PROGRESS NOTES
"Nay Frost is a 83 y.o. female on day 2 of admission presenting with Closed right hip fracture, initial encounter (CMS/[facility]).    Subjective   Patient is POD 1 from right hip hemiarthroplasty.  Unable to obtain review of systems due to patient's advanced dementia and inability to answer questions appropriately.  Patient appears comfortable on AM exam and nursing staff report no new concerns.     Objective     Physical Exam  HENT:      Head: Normocephalic and atraumatic.   Eyes:      Extraocular Movements: Extraocular movements intact.      Conjunctiva/sclera: Conjunctivae normal.      Pupils: Pupils are equal, round, and reactive to light.   Cardiovascular:      Rate and Rhythm: Normal rate and regular rhythm.      Pulses: Normal pulses.   Pulmonary:      Breath sounds: Normal breath sounds.   Abdominal:      General: Bowel sounds are normal.      Palpations: Abdomen is soft.   Musculoskeletal:      Cervical back: Neck supple.      Comments: Right hip incision dressing C/D/I, soft tissues around surgical site soft and compressible, motion and sensations intact, 2+ DP/PT pulses on RLE, capillary refill < 2 RLE   Skin:     General: Skin is warm and dry.      Capillary Refill: Capillary refill takes less than 2 seconds.   Neurological:      Mental Status: Mental status is at baseline. She is disoriented.      Comments: Responds to name, confused to place and time, follows simple commands with encouragement        Last Recorded Vitals  Blood pressure 125/61, pulse 81, temperature 36 °C (96.8 °F), resp. rate 16, height 1.6 m (5' 3\"), weight 50.1 kg (110 lb 7.2 oz), SpO2 95 %.  Intake/Output last 3 Shifts:  I/O last 3 completed shifts:  In: 760 (15.2 mL/kg) [I.V.:60 (1.2 mL/kg); IV Piggyback:700]  Out: 400 (8 mL/kg) [Urine:300 (0.2 mL/kg/hr); Blood:100]  Weight: 50.1 kg     Relevant Results      Scheduled medications  aspirin, 81 mg, oral, Daily  atorvastatin, 40 mg, oral, Daily  calcium carbonate-vitamin D3, 1 " tablet, oral, Daily  ceFAZolin, 2 g, intravenous, q8h  cholecalciferol, 5,000 Units, oral, Daily  co-enzyme Q-10, 100 mg, oral, Daily  docusate sodium, 100 mg, oral, BID  donepezil, 5 mg, oral, Nightly  enoxaparin, 30 mg, subcutaneous, q12h PHU  metoprolol tartrate, 50 mg, oral, Daily  multivitamin with minerals, 1 tablet, oral, Daily  thiamine, 100 mg, oral, Daily  traZODone, 25 mg, oral, Nightly      Continuous medications  oxygen, 2 L/min  sodium chloride, 60 mL/hr, Last Rate: Stopped (10/11/23 1247)      PRN medications  PRN medications: acetaminophen **OR** acetaminophen **OR** acetaminophen, alum-mag hydroxide-simeth, dextromethorphan-guaifenesin, guaiFENesin, morphine, ondansetron **OR** ondansetron, ondansetron  No results found for this or any previous visit (from the past 24 hour(s)).               Assessment/Plan   Principal Problem:    Closed right hip fracture, initial encounter (CMS/[facility])  Active Problems:    Foreign body in alimentary tract    Right hip fracture:  - POD 1 from right hip hemiarthroplasty  - doing well post-operatively  - PT evaluate and treat  - continue recs per medicine  - does not appear uncomfortable  - will need to follow up in ortho clinic 2 weeks post-op  - ortho will sign off, please page with any questions or concerns    Plan discussed with Dr. Terrell who agrees with above.       I spent 25 minutes in the professional and overall care of this patient.    Bethanie Nuñez, APRN-CNP

## 2023-10-12 NOTE — CARE PLAN
Ethics consult placed re: decision maker.  Consulted Dr. David.  Will contact Mabel Wilkins re: other contact numbers.  ADDED [id number] - Mabel Wilkins , Samara, called back to  to say pt's dtr is currently out of the country and can be reached by email at [email or web address] or text at [phone].  Consulted with [facility] who is messaging the team with this information.  I will update Dr. David.

## 2023-10-12 NOTE — CARE PLAN
The patient's goals for the shift include  get out of bed         The clinical goals for the shift include Rest without discomfort    Over the shift, the patient did not make progress toward the following goals. Barriers to progression include advance dementia. Recommendations to address these barriers include reorient.

## 2023-10-12 NOTE — CARE PLAN
The patient's goals for the shift include  sleeping    The clinical goals for the shift include to rest without discomfort      Over the shift, the patient did make progress toward the following goals. Barriers to progression include pain. Recommendations to address these barriers include medications, relaxation, repositioning, and ice.   Patient tolerated turning this shift. Patient was able to void. Patient was able to sleep at least 6 hours this shift, on and off.

## 2023-10-12 NOTE — DISCHARGE INSTR - ACTIVITY
Total Hip Replacement Posterior Approach: for 6 weeks: NO bending more than 90 degrees at the waist, NO crossing legs or ankles, NO twisting, No rolling your leg inward, NO sitting on low surfaces.  Light activity. Frequent rest with ice. Hip precautions 6 + weeks.  Pillow between legs in beds. Elevated Commode.  Do not remove Mepilex AG dressing from the hip  until follow up visit in 2 weeks with doctor. If dressing becomes saturated and starts leaking  notify your surgeon.   Call the office if having increased redness, pain, swelling, drainage at incision or if you have fever and chills.

## 2023-10-12 NOTE — CARE PLAN
POC  Problem: Balance  Goal: Pt will demo good static/dynamic sitting balance and FAIR standing balance during ADL and functional activity with UE support for improved independence and participation in ADL   Outcome: Progressing     Problem: Dressings Lower Extremities  Goal: Patient to complete lower body dressing at mod A  Outcome: Progressing     Problem: Dressing Upper Extremities  Goal: Patient will dress upper body at min A  Outcome: Progressing     Problem: Toileting  Goal: Patient will complete toileting tasks with FWW at mod A  Outcome: Progressing     Problem: Transfers   Goal: Pt to demonstrate transfers with FWW at min A  Outcome: Progressing  Goal: Pt will demonstrate bed mobility at min A  Outcome: Progressing

## 2023-10-12 NOTE — PROGRESS NOTES
Occupational Therapy    Evaluation    Patient Name: Nay Frost  MRN: [id number]  Today's Date: 10/12/2023  Time Calculation  Start Time: 0904  Stop Time: 0924  Time Calculation (min): 20 min    Assessment  IP OT Assessment  OT Assessment: Pt is a 84 y/o woman who was admitted for fall and is POD #1 from a R hip hemiarthroplasty. Pt is from [facility] and has signifcant cognitive deficits. Unable to gather PLOF from patient or family. Pt presents with pain, decreased balance, endurance, and strength. Pt is a high fall risk.  Prognosis: Fair  Evaluation/Treatment Tolerance: Patient tolerated treatment well  Medical Staff Made Aware: Yes  Plan:  Treatment Interventions: ADL retraining, Functional transfer training, UE strengthening/ROM, Endurance training  OT Frequency: 3 times per week  OT Discharge Recommendations: Low intensity level of continued care, 24 hr supervision due to cognition  OT Recommended Transfer Status: Dependent    Subjective   Current Problem:  1. Closed right hip fracture, initial encounter (CMS/[facility])  Case Request Operating Room: Right hip hemiarthroplasty    Case Request Operating Room: Right hip hemiarthroplasty      2. Foreign body in digestive tract, initial encounter  CANCELED: Case Request Operating Room: Esophagogastroduodenoscopy with Removal Foreign Body    CANCELED: Case Request Operating Room: Esophagogastroduodenoscopy with Removal Foreign Body        General:  General  Reason for Referral: Pt is a 84 y/o woman who was admitted s/p fall and is POD #1 from a R hip hemiarthroplasty  Past Medical History Relevant to Rehab: PMHx: Alzheimer's disease, Amnesia, COVID-19, Crohn's disease, Hyperlipidemia, Hypertension, Insomnia   , Migraine, Osteoporosis, Seizures  Family/Caregiver Present: No  Co-Treatment: PT  Co-Treatment Reason: To maximize pt's outcomes  Prior to Session Communication: Bedside nurse  Patient Position Received: Bed, 3 rail up, Alarm on  General Comment: Pt was lying in  the bed and agreeable to OT eval. Pt oriented to self and is pleasantly confused. Pt is requiring two person assist for bed mobility and is dependent for stand pivot transfer. Pt unable to understand/follow hip precautions  Precautions:  LE Weight Bearing Status: Weight Bearing as Tolerated (R LE)  Medical Precautions: Fall precautions (IV, pure-wick, SCD's, waffle boots, alarms)  Post-Surgical Precautions: Right hip precautions (posterior hip precautions)  Precautions Comment: Pt unable to understand posterior hip precautions d/t cognition  Pain:  Pain Assessment  [place] FACES Pain Rating: Hurts little bit  Pain Type: Surgical pain  Pain Location: Hip  Pain Orientation: Right    Objective   Cognition:  Overall Cognitive Status: Impaired at baseline  Attention: Exceptions to WFL  Memory: Exceptions to WFL  Safety/Judgement: Exceptions to WFL  Home Living:  Type of Home: Long Term Care facility  Lives With: Alone   Prior Function:  Level of [place]: Unable to asses at this time  Prior Function Comments: Pt is from [facility]. Unable to gather PLOF d/t pt's cognition and pt's daughter being unavailable. Anticipate pt needs assist with ADL's d/t safety/ cognition.  ADL:  ADL Comments: Anticipate pt to be total A for LB ADL's and max-total A for UB ADL's  Activity Tolerance:  Endurance: Tolerates less than 10 min exercise, no significant change in vital signs  Bed Mobility/Transfers: Bed Mobility  Bed Mobility: Yes  Bed Mobility 1  Bed Mobility 1: Supine to sitting  Level of Assistance 1: Maximum assistance, Maximum verbal cues, Maximum tactile cues (Max A x 2)  Bed Mobility Comments 1: Pt sat EOB and tended to lean towards the L side to offset pain on the R hip. Pt requiring max-min A for sitting balance   and Transfers  Transfer: Yes  Transfer 1  Trials/Comments 1: Attempted x2 sit to stands from the bed with the FWW at max A x2. Pt able to stand up on the 2nd trial, but both legs seem to buckle and pt  would sit down abruptly.  Transfers 2  Transfer From 2: Bed to  Transfer to 2: Chair with arms  Technique 2: Stand pivot  Transfer Level of Assistance 2: Dependent     Strength:  Strength Comments: Decreased UB strength bilaterally    Extremities: RUE   RUE : Exceptions to WFL and LUE   LUE: Within Functional Limits    Outcome Measures: [facility] Daily Activity  Putting on and taking off regular lower body clothing: Total  Bathing (including washing, rinsing, drying): Total  Putting on and taking off regular upper body clothing: Total  Toileting, which includes using toilet, bedpan or urinal: Total  Taking care of personal grooming such as brushing teeth: Total  Eating Meals: Total  Daily Activity - Total Score: 6      Education Documentation  No documentation found.  Education Comments  No comments found.      Goals:   Encounter Problems       Encounter Problems (Active)             Balance       Pt will demo good static/dynamic sitting balance and FAIR standing balance during ADL and functional activity with UE support for improved independence and participation in ADL  (Progressing)       Start:  10/12/23    Expected End:  10/26/23               Dressing Upper Extremities       Patient will dress upper body at min A (Progressing)       Start:  10/12/23    Expected End:  10/26/23               Dressings Lower Extremities       Patient to complete lower body dressing at mod A (Progressing)       Start:  10/12/23    Expected End:  10/26/23                     Toileting       Patient will complete toileting tasks with FWW at mod A (Progressing)       Start:  10/12/23    Expected End:  10/26/23               Transfers       transfers (Progressing)       Start:  10/12/23    Expected End:  10/26/23       Pt will complete all functional transfers with Jose Antonio x1 necessary to initiate return to PLOF               Transfers        Pt to demonstrate transfers with FWW at min A (Progressing)       Start:  10/12/23    Expected End:   10/26/23            Pt will demonstrate bed mobility at min A (Progressing)       Start:  10/12/23    Expected End:  10/26/23

## 2023-10-12 NOTE — CONSULTS
"Reason For Consult  Absence of decision maker    History Of Present Illness  Nay Frost is a 83 y.o. female presenting with Hip fracture.    Per H&P  \"Nay Frost is an 83 y.o. female, [facility] resident, with history of dementia who presented with a fall and pain in right hip. Pt is not able to provide any history at all due to dementia. Per EMR and ECF documentation, she had an unwitnessed fall at the F yesterday. Afterwards, she complained of pain in right leg particularly the right hip. She was brought to [facility] ED and imaging studies revealed a fracture of the right proximal femur. Pt denies having any chest pain, palpitations or SOB. She supposedly is wheel chair bound. She has no documented history of structural heart disease.\"    Per CNP when patient swallowed a form piece  \"Patient bit fork last night and bit off a portion of the fork.  Witnessed by nursing.  Surgery was notified and would like to perform EGD but staff unable to contact POA (daughter Zeenat).  Yesterday, I did speak with her daughter to update her on her mom's plan of care.  She was advised that her mom needed a hemiarthroplasty of her hip due to her fracture.  Daughter asked what type of insurance her mom had and how much the surgery would cost.  I advised her I did not know those answers but it was in her mom's best interest to have her fracture repaired.  The daughter then told me that she is out of the country and she is 8 hours ahead of us.  She then abruptly ended the call and turned her phone off.  Since then, several attempts have been made by nursing and other providers to obtain contact but phone is pushed straight to voicemail.\"      Per IM note  \"Multiple attempts made by myself and orthopedic surgical team to contact daughter (Zeenat) who is listed as POA for patient without response. I myself have called and left three messages for daughter asking for emergent response but now number is " [facility] Spoke with Dr. Terrell (orthopedic surgery) and we agree that having surgical correction urgently is in the best interest of the patient as we have already delayed surgery for 24 hours and further delay can cause increased mortality for patient. We both signed consent and will proceed to surgery later today. \"       Past Medical History  She has a past medical history of Alzheimer's disease (CMS/[facility]), Amnesia, COVID-19, Crohn's disease (CMS/[facility]), Dementia (CMS/[facility]), Hip fracture, right (CMS/[facility]) (10/09/2023), Hyperlipidemia, Hypertension, Insomnia, Migraine, Osteoporosis, and Seizures (CMS/[facility]).    Surgical History  She has no past surgical history on file.     Social History  She reports that she does not currently use alcohol. She reports that she does not currently use drugs. No history on file for tobacco use.    Family History  No family history on file.     Allergies  Bee pollen, Erythromycin, [place] flavor, Iodine, Pneumococcal vaccine, Shellfish derived, Strawberry, Sulfa (sulfonamide antibiotics), and Tree nuts         Last Recorded Vitals  Blood pressure 125/61, pulse 81, temperature 36 °C (96.8 °F), resp. rate 16, height 1.6 m (5' 3\"), weight 50.1 kg (110 lb 7.2 oz), SpO2 95 %.       Assessment/Plan   Issues with contacting [facility]  From an ethics standpoint in this case, it is appropriate to proceed with any treatments that are felt by the medical team to be necessary emergently to keep the patient alive until the [facility]- can be reached again. Based on the information in the notes on the chart, this would be the cause in terms of the planned surgery as Im and ortho felt delay would have increased the risk of mortality to the patient.    I spent 15 minutes in the professional and overall care of this patient.      Tan David, DO    "

## 2023-10-12 NOTE — CARE PLAN
Problem: Balance  Goal: balance  Description: Pt will demo static/dynamic sitting balance x10 min and standing balance x3+ minutes with B UE support and Jose Antonio x1 to improve stability and decrease falls    Outcome: Progressing     Problem: Mobility  Goal: strength  Description: X10+ reps ther ex to increase general strength and improve functional independence     Outcome: Progressing  Goal: bed mobility   Description: Pt will completed bed mobility with Jose Antonio x1 necessary for homegoing     Outcome: Progressing     Problem: Transfers  Goal: transfers  Description: Pt will complete all functional transfers with Jose Antonio x1 necessary to initiate return to PLOF     Outcome: Progressing

## 2023-10-12 NOTE — CARE PLAN
Problem: Nutrition  Goal: Less than 5 days NPO/clear liquids  10/12/2023 1017 by Lynne Tee V, RN  Outcome: Progressing  10/12/2023 1008 by Lynne Tee V, RN  Outcome: Progressing  Goal: Oral intake greater than 50%  10/12/2023 1017 by Lynne Tee V, RN  Outcome: Progressing  10/12/2023 1008 by Lynne Tee V, RN  Outcome: Progressing  Goal: Oral intake greater 75%  10/12/2023 1017 by Lynne Tee V, RN  Outcome: Progressing  10/12/2023 1008 by Lynne Tee V, RN  Outcome: Progressing  Goal: Consume prescribed supplement  10/12/2023 1017 by Lynne Tee V, RN  Outcome: Progressing  10/12/2023 1008 by Lynne Tee V, RN  Outcome: Progressing  Goal: Adequate PO fluid intake  10/12/2023 1017 by Lynne Tee V, RN  Outcome: Progressing  10/12/2023 1008 by Lynne Tee V, RN  Outcome: Progressing  Goal: Nutrition support goals are met within 48 hrs  10/12/2023 1017 by Lynne Tee V, RN  Outcome: Progressing  10/12/2023 1008 by Lynne Tee V, RN  Outcome: Progressing  Goal: Nutrition support is meeting 75% of nutrient needs  10/12/2023 1017 by Lynne Tee V, RN  Outcome: Progressing  10/12/2023 1008 by Lynne Tee V, RN  Outcome: Progressing  Goal: Tube feed tolerance  10/12/2023 1017 by Lynne Tee V, RN  Outcome: Progressing  10/12/2023 1008 by Lynne Tee V, RN  Outcome: Progressing  Goal: BG  mg/dL  10/12/2023 1017 by Lynne Tee V, RN  Outcome: Progressing  10/12/2023 1008 by Lynne Tee V, RN  Outcome: Progressing  Goal: Lab values WNL  10/12/2023 1017 by Lynne Tee V, RN  Outcome: Progressing  10/12/2023 1008 by Lynne Tee V, RN  Outcome: Progressing  Goal: Electrolytes WNL  10/12/2023 1017 by Lynne Tee V, RN  Outcome: Progressing  10/12/2023 1008 by Lynne Tee V, RN  Outcome: Progressing  Goal: Promote healing  10/12/2023 1017 by Lynne Tee V RN  Outcome: Progressing  10/12/2023 1008 by Lynne Tee V,  RN  Outcome: Progressing  Goal: Maintain stable weight  10/12/2023 1017 by Lynne Tee V, RN  Outcome: Progressing  10/12/2023 1008 by Lynne Tee V, RN  Outcome: Progressing  Goal: Reduce weight from edema/fluid  10/12/2023 1017 by Lynne Tee V, RN  Outcome: Progressing  10/12/2023 1008 by Lynne Tee V, RN  Outcome: Progressing  Goal: Gradual weight gain  10/12/2023 1017 by Lynne Tee V, RN  Outcome: Progressing  10/12/2023 1008 by Lynne Tee V, RN  Outcome: Progressing  Goal: Improve ostomy output  10/12/2023 1017 by Lynne Tee V, RN  Outcome: Progressing  10/12/2023 1008 by Lynne Tee V, RN  Outcome: Progressing     Problem: Pain - Adult  Goal: Verbalizes/displays adequate comfort level or baseline comfort level  10/12/2023 1017 by Lynne Tee V, RN  Outcome: Progressing  10/12/2023 1008 by Lynne Tee V, RN  Outcome: Progressing     Problem: Safety - Adult  Goal: Free from fall injury  10/12/2023 1017 by Lynne Tee V, RN  Outcome: Progressing  10/12/2023 1008 by Lynne Tee V, RN  Outcome: Progressing     Problem: Discharge Planning  Goal: Discharge to home or other facility with appropriate resources  10/12/2023 1017 by Lynne Tee V, RN  Outcome: Progressing  10/12/2023 1008 by Lynne Tee V, RN  Outcome: Progressing     Problem: Chronic Conditions and Co-morbidities  Goal: Patient's chronic conditions and co-morbidity symptoms are monitored and maintained or improved  10/12/2023 1017 by Lynne Tee V, RN  Outcome: Progressing  10/12/2023 1008 by Lynne Tee V, RN  Outcome: Progressing     Problem: Skin  Goal: Decreased wound size/increased tissue granulation at next dressing change  10/12/2023 1017 by Lynne Tee V, RN  Outcome: Progressing  10/12/2023 1008 by Lynne Tee V, RN  Outcome: Progressing  Goal: Participates in plan/prevention/treatment measures  10/12/2023 1017 by Lynne Tee V, RN  Outcome: Progressing  10/12/2023  1008 by Lynne Tee V, RN  Outcome: Progressing  Goal: Prevent/manage excess moisture  10/12/2023 1017 by Lynne Tee V, RN  Outcome: Progressing  10/12/2023 1008 by Lynne Tee V, RN  Outcome: Progressing  Goal: Prevent/minimize sheer/friction injuries  10/12/2023 1017 by Lynne Tee V, RN  Outcome: Progressing  10/12/2023 1008 by Lynne Tee V, RN  Outcome: Progressing  Goal: Promote/optimize nutrition  10/12/2023 1017 by Lynne Tee V, RN  Outcome: Progressing  10/12/2023 1008 by Lynne Tee V, RN  Outcome: Progressing  Goal: Promote skin healing  10/12/2023 1017 by Lynne Tee V, RN  Outcome: Progressing  10/12/2023 1008 by Lynne Tee V, RN  Outcome: Progressing     Problem: Fall/Injury  Goal: Not fall by end of shift  10/12/2023 1017 by Lynne Tee V, RN  Outcome: Progressing  10/12/2023 1008 by Lynne Tee V, RN  Outcome: Progressing  Goal: Be free from injury by end of the shift  10/12/2023 1017 by Lynne Tee V, RN  Outcome: Progressing  10/12/2023 1008 by Lynne Tee V, RN  Outcome: Progressing  Goal: Verbalize understanding of personal risk factors for fall in the hospital  10/12/2023 1017 by Lynne Tee V, RN  Outcome: Progressing  10/12/2023 1008 by Lynne Tee V, RN  Outcome: Progressing  Goal: Verbalize understanding of risk factor reduction measures to prevent injury from fall in the home  10/12/2023 1017 by Lynne Tee V, RN  Outcome: Progressing  10/12/2023 1008 by Lynne Tee V, RN  Outcome: Progressing  Goal: Use assistive devices by end of the shift  10/12/2023 1017 by Lynne Tee V, RN  Outcome: Progressing  10/12/2023 1008 by Lynne Tee V, RN  Outcome: Progressing  Goal: Pace activities to prevent fatigue by end of the shift  10/12/2023 1017 by Lynne Tee V, RN  Outcome: Progressing  10/12/2023 1008 by Lynne Tee V, RN  Outcome: Progressing   The patient's goals for the shift include      The clinical goals  for the shift include out of bed    Over the shift, the patient did not make progress toward the following goals. Barriers to progression include dementia. Recommendations to address these barriers include reorient patient.

## 2023-10-13 ENCOUNTER — PHARMACY VISIT (OUTPATIENT)
Dept: PHARMACY | Facility: CLINIC | Age: 83
End: 2023-10-13
Payer: COMMERCIAL

## 2023-10-13 VITALS
RESPIRATION RATE: 16 BRPM | OXYGEN SATURATION: 93 % | TEMPERATURE: 97.3 F | HEART RATE: 59 BPM | WEIGHT: 110.45 LBS | SYSTOLIC BLOOD PRESSURE: 100 MMHG | HEIGHT: 63 IN | BODY MASS INDEX: 19.57 KG/M2 | DIASTOLIC BLOOD PRESSURE: 68 MMHG

## 2023-10-13 PROBLEM — T18.9XXA FOREIGN BODY IN ALIMENTARY TRACT: Status: RESOLVED | Noted: 2023-10-09 | Resolved: 2023-10-13

## 2023-10-13 PROBLEM — N39.0 UTI (URINARY TRACT INFECTION): Status: RESOLVED | Noted: 2023-10-13 | Resolved: 2023-10-13

## 2023-10-13 PROBLEM — N39.0 UTI (URINARY TRACT INFECTION): Status: ACTIVE | Noted: 2023-10-13

## 2023-10-13 LAB
ALBUMIN SERPL BCP-MCNC: 2.7 G/DL (ref 3.4–5)
ALP SERPL-CCNC: 65 U/L (ref 33–136)
ALT SERPL W P-5'-P-CCNC: 3 U/L (ref 7–45)
ANION GAP SERPL CALC-SCNC: 9 MMOL/L (ref 10–20)
AST SERPL W P-5'-P-CCNC: 41 U/L (ref 9–39)
BILIRUB SERPL-MCNC: 0.6 MG/DL (ref 0–1.2)
BUN SERPL-MCNC: 32 MG/DL (ref 6–23)
CALCIUM SERPL-MCNC: 8.6 MG/DL (ref 8.6–10.3)
CHLORIDE SERPL-SCNC: 108 MMOL/L (ref 98–107)
CO2 SERPL-SCNC: 30 MMOL/L (ref 21–32)
CREAT SERPL-MCNC: 1.17 MG/DL (ref 0.5–1.05)
ERYTHROCYTE [DISTWIDTH] IN BLOOD BY AUTOMATED COUNT: 14.1 % (ref 11.5–14.5)
GFR SERPL CREATININE-BSD FRML MDRD: 46 ML/MIN/1.73M*2
GLUCOSE SERPL-MCNC: 104 MG/DL (ref 74–99)
HCT VFR BLD AUTO: 26.9 % (ref 36–46)
HGB BLD-MCNC: 8.3 G/DL (ref 12–16)
MCH RBC QN AUTO: 30.3 PG (ref 26–34)
MCHC RBC AUTO-ENTMCNC: 30.9 G/DL (ref 32–36)
MCV RBC AUTO: 98 FL (ref 80–100)
NRBC BLD-RTO: 0 /100 WBCS (ref 0–0)
PLATELET # BLD AUTO: 82 X10*3/UL (ref 150–450)
PMV BLD AUTO: 12.3 FL (ref 7.5–11.5)
POTASSIUM SERPL-SCNC: 3.9 MMOL/L (ref 3.5–5.3)
PROT SERPL-MCNC: 5 G/DL (ref 6.4–8.2)
RBC # BLD AUTO: 2.74 X10*6/UL (ref 4–5.2)
SODIUM SERPL-SCNC: 143 MMOL/L (ref 136–145)
STAPHYLOCOCCUS SPEC CULT: NORMAL
WBC # BLD AUTO: 7.5 X10*3/UL (ref 4.4–11.3)

## 2023-10-13 PROCEDURE — 2500000001 HC RX 250 WO HCPCS SELF ADMINISTERED DRUGS (ALT 637 FOR MEDICARE OP): Performed by: ORTHOPAEDIC SURGERY

## 2023-10-13 PROCEDURE — 2500000004 HC RX 250 GENERAL PHARMACY W/ HCPCS (ALT 636 FOR OP/ED): Performed by: ORTHOPAEDIC SURGERY

## 2023-10-13 PROCEDURE — 36415 COLL VENOUS BLD VENIPUNCTURE: CPT | Performed by: NURSE PRACTITIONER

## 2023-10-13 PROCEDURE — 96372 THER/PROPH/DIAG INJ SC/IM: CPT | Performed by: ORTHOPAEDIC SURGERY

## 2023-10-13 PROCEDURE — 80053 COMPREHEN METABOLIC PANEL: CPT | Performed by: NURSE PRACTITIONER

## 2023-10-13 PROCEDURE — 99238 HOSP IP/OBS DSCHRG MGMT 30/<: CPT | Performed by: NURSE PRACTITIONER

## 2023-10-13 PROCEDURE — 85027 COMPLETE CBC AUTOMATED: CPT | Performed by: NURSE PRACTITIONER

## 2023-10-13 PROCEDURE — 2500000004 HC RX 250 GENERAL PHARMACY W/ HCPCS (ALT 636 FOR OP/ED): Performed by: NURSE PRACTITIONER

## 2023-10-13 RX ORDER — ASPIRIN 81 MG/1
81 TABLET ORAL 2 TIMES DAILY
Qty: 60 TABLET | Refills: 0 | Status: SHIPPED | OUTPATIENT
Start: 2023-10-13 | End: 2023-11-12

## 2023-10-13 RX ORDER — TAMSULOSIN HYDROCHLORIDE 0.4 MG/1
0.4 CAPSULE ORAL
Qty: 30 CAPSULE | Refills: 0 | Status: SHIPPED | OUTPATIENT
Start: 2023-10-13 | End: 2023-12-09 | Stop reason: HOSPADM

## 2023-10-13 RX ORDER — TRAMADOL HYDROCHLORIDE 50 MG/1
50 TABLET ORAL EVERY 12 HOURS PRN
Qty: 10 TABLET | Refills: 0 | Status: SHIPPED | OUTPATIENT
Start: 2023-10-13 | End: 2023-12-09 | Stop reason: HOSPADM

## 2023-10-13 RX ADMIN — ENOXAPARIN SODIUM 30 MG: 30 INJECTION SUBCUTANEOUS at 09:02

## 2023-10-13 RX ADMIN — Medication 1 TABLET: at 09:01

## 2023-10-13 RX ADMIN — THIAMINE HCL TAB 100 MG 100 MG: 100 TAB at 09:01

## 2023-10-13 RX ADMIN — ATORVASTATIN CALCIUM 40 MG: 40 TABLET, FILM COATED ORAL at 09:01

## 2023-10-13 RX ADMIN — CEFAZOLIN SODIUM 2 G: 2 INJECTION, SOLUTION INTRAVENOUS at 04:45

## 2023-10-13 RX ADMIN — SODIUM CHLORIDE 60 ML/HR: 4.5 INJECTION, SOLUTION INTRAVENOUS at 06:23

## 2023-10-13 RX ADMIN — ASPIRIN 81 MG: 81 TABLET, COATED ORAL at 09:03

## 2023-10-13 RX ADMIN — ACETAMINOPHEN 650 MG: 325 TABLET ORAL at 09:02

## 2023-10-13 RX ADMIN — MULTIPLE VITAMINS W/ MINERALS TAB 1 TABLET: TAB at 09:01

## 2023-10-13 RX ADMIN — Medication 5000 UNITS: at 09:02

## 2023-10-13 RX ADMIN — DOCUSATE SODIUM 100 MG: 100 CAPSULE, LIQUID FILLED ORAL at 09:01

## 2023-10-13 RX ADMIN — CEFAZOLIN SODIUM 2 G: 2 INJECTION, SOLUTION INTRAVENOUS at 12:53

## 2023-10-13 RX ADMIN — Medication 100 MG: at 09:03

## 2023-10-13 ASSESSMENT — COGNITIVE AND FUNCTIONAL STATUS - GENERAL
MOVING FROM LYING ON BACK TO SITTING ON SIDE OF FLAT BED WITH BEDRAILS: A LOT
PERSONAL GROOMING: TOTAL
CLIMB 3 TO 5 STEPS WITH RAILING: TOTAL
DRESSING REGULAR LOWER BODY CLOTHING: TOTAL
WALKING IN HOSPITAL ROOM: TOTAL
TURNING FROM BACK TO SIDE WHILE IN FLAT BAD: A LOT
HELP NEEDED FOR BATHING: TOTAL
STANDING UP FROM CHAIR USING ARMS: TOTAL
DAILY ACTIVITIY SCORE: 7
DRESSING REGULAR UPPER BODY CLOTHING: TOTAL
TOILETING: TOTAL
EATING MEALS: A LOT
MOVING TO AND FROM BED TO CHAIR: TOTAL
MOBILITY SCORE: 8

## 2023-10-13 ASSESSMENT — ACTIVITIES OF DAILY LIVING (ADL): LACK_OF_TRANSPORTATION: NO

## 2023-10-13 ASSESSMENT — PAIN SCALES - PAIN ASSESSMENT IN ADVANCED DEMENTIA (PAINAD)
NEGVOCALIZATION: OCCASIONAL MOAN/GROAN, LOW SPEECH, NEGATIVE/DISAPPROVING QUALITY
TOTALSCORE: 2
CONSOLABILITY: NO NEED TO CONSOLE
BODYLANGUAGE: RELAXED
BREATHING: NORMAL
FACIALEXPRESSION: SAD, FRIGHTENED, FROWN

## 2023-10-13 NOTE — PROGRESS NOTES
10/13/23 1038   Discharge Planning   Living Arrangements Other (Comment)  ([facility] long term)   Support Systems Children   Assistance Needed patient alert to self, dependent for ADL's   Type of Residence Nursing home/residential care   Who is requesting discharge planning? Provider   Home or Post Acute Services Post acute facilities (Rehab/SNF/etc)   Type of Post Acute Facility Services Long term care   Patient expects to be discharged to: [facility] SNF   RoundTrip coordination needed? Yes   Has discharge transport been arranged? No   What day is the transport expected? 10/14/23   Financial Resource Strain   How hard is it for you to pay for the very basics like food, housing, medical care, and heating? Patient refu  (Patient lives in LTC facility)   Housing Stability   In the last 12 months, was there a time when you were not able to pay the mortgage or rent on time? AL  (Patient lives in LTC facility)   In the last 12 months, was there a time when you did not have a steady place to sleep or slept in a shelter (including now)? AL  (Patient lives in LTC facility)   Transportation Needs   In the past 12 months, has lack of transportation kept you from medical appointments or from getting medications? no   In the past 12 months, has lack of transportation kept you from meetings, work, or from getting things needed for daily living? No     10/13/23 1041: patient continues treatment for UTI, not medically ready for DC per physician    10/13/2023 1102: made aware by provider that patient is ready for discharge. Daughter made aware of transfer back to [facility] at 3pm via [facility] stretcher. Bedside nurse notified of discharge plan, transport time and nurse to nurse number.

## 2023-10-13 NOTE — NURSING NOTE
Report called the [facility].  Re:updated on blood pressure and NP Louis strickland with pt being discharged.

## 2023-10-13 NOTE — NURSING NOTE
[facility] on unit to take patient. Mannual BP obtained by bedside nurse and was 98/42. Call placed to [facility] by this nurse to inform. [facility] had this nurse re-ceck BP in opposite arm. BP readings in left arm were 100/57 and 107/50. Per CNP, Michell Myers, patient is OK to DC to [facility] at this time.

## 2023-10-13 NOTE — CARE PLAN
The patient's goals for the shift include  discharge    The clinical goals for the shift include pt will remain hemodynamically stable    Over the shift, the patient did not make progress toward the following goals. Barriers to progression include poor mentation. Recommendations to address these barriers include reorient.

## 2023-10-13 NOTE — SIGNIFICANT EVENT
Pt. Is not able to do IS at all. She also tried sticking my pulse ox that was on her finger in her mouth and eating it.

## 2023-10-13 NOTE — DISCHARGE SUMMARY
Discharge Diagnosis  Closed right hip fracture, initial encounter (CMS/[facility])    Issues Requiring Follow-Up  Right hip fracture post op     Test Results Pending At Discharge  Pending Labs       No current pending labs.            Hospital Course    Nay Frost is an 83 y.o. female, [facility] resident, with history of dementia who presented with a fall and pain in right hip.       Principal Problem:    Closed right hip fracture, initial encounter (CMS/[facility])  Active Problems:       #Closed right hip fracture, initial encounter (CMS/[facility])  Orthopedics following  Pain control  Fall precautions     #Urinary retention s/p anesthetic  #UTI  Mathew catheter now and follow with urology in 2 weeks  Flomax added  Cefazolin complete     #Hypernatremia secondary to free water depletion (improving)  #Azotemia related to #2  Fluids given    #Foreign body in alimentary tract  No family available patient condition unchanged  Ethics consulted- family will not respond to calls and was unwilling to provide consent for procedures. Daughter hung up on the provider who attempted to contact her.       #DVT prophylaxis  ASA 81mg BID X30 days    Pertinent Physical Exam At Time of Discharge  Physical Exam    Home Medications     Medication List      CHANGE how you take these medications     aspirin 81 mg EC tablet; Take 1 tablet (81 mg) by mouth 2 times a day.;   What changed: when to take this     CONTINUE taking these medications     atorvastatin 40 mg tablet; Commonly known as: Lipitor   bisacodyL 5 mg tablet   calcium citrate-vitamin D2 250 mg-2.5 mcg (100 unit) tablet   cholecalciferol 5,000 Units tablet; Commonly known as: Vitamin D-3   co-enzyme Q-10 50 mg capsule   donepezil 5 mg tablet; Commonly known as: Aricept   magnesium 250 mg tablet   magnesium hydroxide 400 mg/5 mL suspension; Commonly known as: Milk of   Magnesia   metoprolol tartrate 50 mg tablet; Commonly known as: Lopressor   multivitamin capsule   sennosides 8.6 mg tablet;  Commonly known as: Senokot   thiamine 100 mg tablet; Commonly known as: Vitamin B-1   traZODone 50 mg tablet; Commonly known as: Desyrel       Results for orders placed or performed during the hospital encounter of 10/09/23 (from the past 24 hour(s))   Urinalysis with Reflex Microscopic   Result Value Ref Range    Color, Urine Yellow Straw, Yellow    Appearance, Urine Hazy (N) Clear    Specific Gravity, Urine 1.021 1.005 - 1.035    pH, Urine 5.0 5.0, 5.5, 6.0, 6.5, 7.0, 7.5, 8.0    Protein, Urine 30 (1+) (N) NEGATIVE mg/dL    Glucose, Urine NEGATIVE NEGATIVE mg/dL    Blood, Urine NEGATIVE NEGATIVE    Ketones, Urine 5 (TRACE) (A) NEGATIVE mg/dL    Bilirubin, Urine NEGATIVE NEGATIVE    Urobilinogen, Urine <2.0 <2.0 mg/dL    Nitrite, Urine POSITIVE (A) NEGATIVE    Leukocyte Esterase, Urine MODERATE (2+) (A) NEGATIVE   Urinalysis Microscopic Only   Result Value Ref Range    WBC, Urine 21-50 (A) 1-5, NONE /HPF    RBC, Urine 1-2 NONE, 1-2, 3-5 /HPF    Squamous Epithelial Cells, Urine 1-9 (SPARSE) Reference range not established. /HPF    Bacteria, Urine 1+ (A) NONE SEEN /HPF    Hyaline Casts, Urine OCCASIONAL (A) NONE /LPF   CBC   Result Value Ref Range    WBC 7.5 4.4 - 11.3 x10*3/uL    nRBC 0.0 0.0 - 0.0 /100 WBCs    RBC 2.74 (L) 4.00 - 5.20 x10*6/uL    Hemoglobin 8.3 (L) 12.0 - 16.0 g/dL    Hematocrit 26.9 (L) 36.0 - 46.0 %    MCV 98 80 - 100 fL    MCH 30.3 26.0 - 34.0 pg    MCHC 30.9 (L) 32.0 - 36.0 g/dL    RDW 14.1 11.5 - 14.5 %    Platelets 82 (L) 150 - 450 x10*3/uL    MPV 12.3 (H) 7.5 - 11.5 fL   Comprehensive Metabolic Panel   Result Value Ref Range    Glucose 104 (H) 74 - 99 mg/dL    Sodium 143 136 - 145 mmol/L    Potassium 3.9 3.5 - 5.3 mmol/L    Chloride 108 (H) 98 - 107 mmol/L    Bicarbonate 30 21 - 32 mmol/L    Anion Gap 9 (L) 10 - 20 mmol/L    Urea Nitrogen 32 (H) 6 - 23 mg/dL    Creatinine 1.17 (H) 0.50 - 1.05 mg/dL    eGFR 46 (L) >60 mL/min/1.73m*2    Calcium 8.6 8.6 - 10.3 mg/dL    Albumin 2.7 (L) 3.4 - 5.0  g/dL    Alkaline Phosphatase 65 33 - 136 U/L    Total Protein 5.0 (L) 6.4 - 8.2 g/dL    AST 41 (H) 9 - 39 U/L    Bilirubin, Total 0.6 0.0 - 1.2 mg/dL    ALT 3 (L) 7 - 45 U/L       Outpatient Follow-Up  Future Appointments   Date Time Provider Department Center   10/23/2023  1:00 PM Mara Diallo PA-C [id number]       [facility]-CNP

## 2023-10-13 NOTE — CONSULTS
Reason For Consult  Response to order for ethics consult due to lack of a decision maker.    History Of Present Illness  Nay Frost is a 83 y.o. female presenting with a closed right hip fracture.  Additionally, it appears that she may have swallowed part of a plastic fork. The patient does not have sufficient capacity to make medical decisions or to designate a proxy decision maker.     Past Medical History  She has advanced Alzheimer's disease    Family History  The patient has a daughter.     Assessment/Plan     I reviewed the patient's chart and spoke with Dr. Becky Bello on 10/12 in response to the order for an ethics consult.  It appears that the daughter has not been responsive to provider telephone calls and advised someone on the care team that she is out of the country at this time.  Becky Kaufman advised me that two physicians had signed off on the surgery as a medical emergency.    RECOMMENDATIONS:  It is ethically appropriate that the care team proceed with standard of care in emergency situations, following evaluation by two physicians who agree on the emergent status of the situation and on the recommended treatment.  For non-emergent care, I suggest that the care team connect with social work and request the establishment of an emergency guardianship so that there is someone in place with the legal authority and ability to provide consent.  This should remain in place unless and until the daughter is willing and able to engage with the care team and meet the responsibilities of a surrogate decision maker.        Bella Diggs JD

## 2023-10-13 NOTE — CARE PLAN
The patient's goals for the shift include      Problem: Nutrition  Goal: Oral intake greater than 50%  Outcome: Not Progressing     Problem: Safety - Adult  Goal: Free from fall injury  Outcome: Progressing     Problem: Skin  Goal: Prevent/manage excess moisture  Outcome: Progressing       The clinical goals for the shift include pt will remain hemodynamically stable    Barriers include pt retaining urine  Encouraged PO intake and assisted  Pain under control    Problem: Nutrition  Goal: Oral intake greater than 50%  Outcome: Not Progressing  Goal: Adequate PO fluid intake  Outcome: Not Progressing
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
## 2023-10-20 PROBLEM — S00.03XA SCALP HEMATOMA: Status: ACTIVE | Noted: 2023-10-20

## 2023-10-20 PROBLEM — R56.9 CONVULSIONS (MULTI): Status: ACTIVE | Noted: 2023-10-20

## 2023-10-20 PROBLEM — R53.1 WEAKNESS GENERALIZED: Status: ACTIVE | Noted: 2023-10-20

## 2023-10-20 PROBLEM — G89.29 CHRONIC PAIN: Status: ACTIVE | Noted: 2023-10-20

## 2023-10-20 PROBLEM — K59.09 CONSTIPATION, CHRONIC: Status: ACTIVE | Noted: 2023-10-20

## 2023-10-20 PROBLEM — F03.90 DEMENTIA WITHOUT BEHAVIORAL DISTURBANCE (MULTI): Status: ACTIVE | Noted: 2023-10-20

## 2023-10-20 PROBLEM — R93.89 ABNORMAL CT SCAN: Status: ACTIVE | Noted: 2023-10-20

## 2023-10-20 RX ORDER — EPINEPHRINE 0.22MG
1 AEROSOL WITH ADAPTER (ML) INHALATION DAILY
COMMUNITY
Start: 2022-07-01 | End: 2023-12-09 | Stop reason: HOSPADM

## 2023-10-20 RX ORDER — ADHESIVE BANDAGE
BANDAGE TOPICAL
Status: ON HOLD | COMMUNITY
End: 2023-12-03 | Stop reason: ENTERED-IN-ERROR

## 2023-10-20 RX ORDER — DIAPER,BRIEF,INFANT-TODD,DISP
1 EACH MISCELLANEOUS DAILY
COMMUNITY
Start: 2022-07-01 | End: 2023-12-09 | Stop reason: HOSPADM

## 2023-10-20 RX ORDER — LORAZEPAM 1 MG/1
TABLET ORAL
Status: ON HOLD | COMMUNITY
Start: 2022-12-01 | End: 2023-12-03 | Stop reason: ENTERED-IN-ERROR

## 2023-10-20 RX ORDER — VIT C/E/ZN/COPPR/LUTEIN/ZEAXAN 250MG-90MG
CAPSULE ORAL
Status: ON HOLD | COMMUNITY
End: 2023-12-03 | Stop reason: ENTERED-IN-ERROR

## 2023-10-20 RX ORDER — FLAXSEED OIL 1000 MG
1000 CAPSULE ORAL DAILY
Status: ON HOLD | COMMUNITY
End: 2023-12-03 | Stop reason: ENTERED-IN-ERROR

## 2023-10-20 RX ORDER — ASPIRIN 81 MG/1
1 TABLET ORAL DAILY
COMMUNITY
Start: 2022-07-01 | End: 2023-12-09 | Stop reason: HOSPADM

## 2023-10-20 RX ORDER — AMOXICILLIN 500 MG
CAPSULE ORAL
Status: ON HOLD | COMMUNITY
End: 2023-12-03 | Stop reason: ENTERED-IN-ERROR

## 2023-10-20 RX ORDER — LEVOFLOXACIN 500 MG/1
TABLET, FILM COATED ORAL
Status: ON HOLD | COMMUNITY
Start: 2023-01-05 | End: 2023-12-03 | Stop reason: ENTERED-IN-ERROR

## 2023-10-20 RX ORDER — TALC
1 POWDER (GRAM) TOPICAL DAILY
Status: ON HOLD | COMMUNITY
Start: 2022-07-01 | End: 2023-12-03 | Stop reason: ENTERED-IN-ERROR

## 2023-10-20 RX ORDER — DEXAMETHASONE 6 MG/1
TABLET ORAL
Status: ON HOLD | COMMUNITY
Start: 2023-01-05 | End: 2023-12-03 | Stop reason: ENTERED-IN-ERROR

## 2023-10-20 RX ORDER — METOPROLOL SUCCINATE 50 MG/1
TABLET, EXTENDED RELEASE ORAL
Status: ON HOLD | COMMUNITY
End: 2023-12-03 | Stop reason: ENTERED-IN-ERROR

## 2023-10-20 RX ORDER — BISACODYL 10 MG/1
SUPPOSITORY RECTAL
Status: ON HOLD | COMMUNITY
End: 2023-12-03 | Stop reason: ENTERED-IN-ERROR

## 2023-10-23 ENCOUNTER — OFFICE VISIT (OUTPATIENT)
Dept: ORTHOPEDIC SURGERY | Facility: CLINIC | Age: 83
End: 2023-10-23
Payer: COMMERCIAL

## 2023-10-23 DIAGNOSIS — S72.001A CLOSED RIGHT HIP FRACTURE, INITIAL ENCOUNTER (MULTI): Primary | ICD-10-CM

## 2023-10-23 DIAGNOSIS — Z96.649 STATUS POST HIP HEMIARTHROPLASTY: ICD-10-CM

## 2023-10-23 PROCEDURE — 1036F TOBACCO NON-USER: CPT

## 2023-10-23 PROCEDURE — 99024 POSTOP FOLLOW-UP VISIT: CPT

## 2023-10-23 PROCEDURE — 1159F MED LIST DOCD IN RCRD: CPT

## 2023-10-23 PROCEDURE — 1160F RVW MEDS BY RX/DR IN RCRD: CPT

## 2023-10-23 PROCEDURE — 1111F DSCHRG MED/CURRENT MED MERGE: CPT

## 2023-10-23 NOTE — PROGRESS NOTES
History of Present Illness  Chief Complaint   Patient presents with    Right Hip - Post-op     Pt has already startedtherapy-Does not seem to be in any pain. Here today with caregivers from [facility].     S/p side: right hip hemiarthroplasty on 10/11/23  Patient returns today denying any significant pain.  She is currently staying at [facility] and is doing OT and PT there. Denies any new neuro deficits. No fever or drainage.  PMH of Alzheimer's so not a good historian     Review of Systems   GENERAL: Negative for malaise, significant weight loss, fever.  Slightly agitated   MUSCULOSKELETAL: see HPI  NEURO:  Negative     Examination  side: right hip  Healthy incision without erythema, warmth, or drainage.  Original post op sterile dressing was removed by myself.  Patient able to stand while assisted for a minute, arrive in wheelchair.      Assessment:  Patient status post side: right hip mikey arthroplasty     Plan  Patient to continue to weight bear as tolerated.  Wound is healing nicely. Discussed incision care with escort from the nursing facility.  Follow-up: in 6 week

## 2023-11-20 ENCOUNTER — OFFICE VISIT (OUTPATIENT)
Dept: ORTHOPEDIC SURGERY | Facility: CLINIC | Age: 83
End: 2023-11-20
Payer: COMMERCIAL

## 2023-11-20 DIAGNOSIS — S72.001A CLOSED RIGHT HIP FRACTURE, INITIAL ENCOUNTER (MULTI): Primary | ICD-10-CM

## 2023-11-20 DIAGNOSIS — Z96.649 STATUS POST HIP HEMIARTHROPLASTY: ICD-10-CM

## 2023-11-20 PROCEDURE — 1159F MED LIST DOCD IN RCRD: CPT

## 2023-11-20 PROCEDURE — 1160F RVW MEDS BY RX/DR IN RCRD: CPT

## 2023-11-20 PROCEDURE — 99024 POSTOP FOLLOW-UP VISIT: CPT

## 2023-11-20 PROCEDURE — 1036F TOBACCO NON-USER: CPT

## 2023-11-20 NOTE — PROGRESS NOTES
HPI  Nay Frost is a 83 y.o. female in office today for follow up of side: right hip hemiarthroplasty.  Patient has dementia and not able to answer questions well today. she did not complain of any pain when asked about it.      Physical Exam  Constitutional: well developed, well nourished female in no acute distress  Psychiatric: normal mood, appropriate affect  Eyes: sclera anicteric  HENT: normocephalic/atraumatic  CV: regular rate and rhythm   Respiratory: non labored breathing  Integumentary: no rash  Neurological: moves all extremities    Right Hip Exam     Tenderness   The patient is experiencing no tenderness.     Other   Erythema: absent  Scars: present  Sensation: normal            Imaging/Lab:  X-rays were taken 11/15/23 by an outside entity, disc provided and images were reviewed by myself and show no evidence of loosening or complications with the right hip hardware    Assessment  Assessment: post right hip hemiarthroplasty    Plan  Plan:  History, physical exam, and imaging were reviewed with patient. Patient to continue to work with PT/OT at the nursing facility.  Weightbearing as tolerated.  Paperwork completed for the SNF  Follow Up: as needed with any issues or concerns.    All questions were answered for the patient prior to end of exam and patient addressed their understanding.    Mara Diallo PA-C  11/20/23

## 2023-12-03 ENCOUNTER — APPOINTMENT (OUTPATIENT)
Dept: RADIOLOGY | Facility: HOSPITAL | Age: 83
End: 2023-12-03
Payer: COMMERCIAL

## 2023-12-03 ENCOUNTER — HOSPITAL ENCOUNTER (INPATIENT)
Facility: HOSPITAL | Age: 83
LOS: 3 days | Discharge: HOSPICE | DRG: 871 | End: 2023-12-06
Attending: STUDENT IN AN ORGANIZED HEALTH CARE EDUCATION/TRAINING PROGRAM | Admitting: STUDENT IN AN ORGANIZED HEALTH CARE EDUCATION/TRAINING PROGRAM
Payer: COMMERCIAL

## 2023-12-03 ENCOUNTER — HOSPITAL ENCOUNTER (EMERGENCY)
Facility: HOSPITAL | Age: 83
Discharge: OTHER INSTITUTION | End: 2023-12-03
Attending: EMERGENCY MEDICINE
Payer: COMMERCIAL

## 2023-12-03 VITALS
DIASTOLIC BLOOD PRESSURE: 83 MMHG | RESPIRATION RATE: 16 BRPM | WEIGHT: 113 LBS | HEIGHT: 63 IN | OXYGEN SATURATION: 94 % | SYSTOLIC BLOOD PRESSURE: 121 MMHG | TEMPERATURE: 96.1 F | BODY MASS INDEX: 20.02 KG/M2 | HEART RATE: 53 BPM

## 2023-12-03 DIAGNOSIS — I95.9 HYPOTENSIVE EPISODE: ICD-10-CM

## 2023-12-03 DIAGNOSIS — E87.0 HYPERNATREMIA: ICD-10-CM

## 2023-12-03 DIAGNOSIS — A41.9 SEPTIC SHOCK (MULTI): ICD-10-CM

## 2023-12-03 DIAGNOSIS — L08.9 INFECTION OF THUMB: ICD-10-CM

## 2023-12-03 DIAGNOSIS — A41.9 SEPTIC SHOCK (MULTI): Primary | ICD-10-CM

## 2023-12-03 DIAGNOSIS — R65.10 SIRS (SYSTEMIC INFLAMMATORY RESPONSE SYNDROME) (MULTI): Primary | ICD-10-CM

## 2023-12-03 DIAGNOSIS — R57.8 OTHER SHOCK (MULTI): ICD-10-CM

## 2023-12-03 DIAGNOSIS — N30.90 BLADDER INFECTION: ICD-10-CM

## 2023-12-03 DIAGNOSIS — R65.21 SEPTIC SHOCK (MULTI): ICD-10-CM

## 2023-12-03 DIAGNOSIS — R65.21 SEPTIC SHOCK (MULTI): Primary | ICD-10-CM

## 2023-12-03 DIAGNOSIS — J18.9 HCAP (HEALTHCARE-ASSOCIATED PNEUMONIA): ICD-10-CM

## 2023-12-03 DIAGNOSIS — L08.9 FINGER INFECTION: ICD-10-CM

## 2023-12-03 LAB
ALBUMIN SERPL BCP-MCNC: 2.7 G/DL (ref 3.4–5)
ALP SERPL-CCNC: 88 U/L (ref 33–136)
ALT SERPL W P-5'-P-CCNC: 20 U/L (ref 7–45)
ANION GAP SERPL CALC-SCNC: 18 MMOL/L (ref 10–20)
APPEARANCE UR: ABNORMAL
APTT PPP: 39 SECONDS (ref 27–38)
AST SERPL W P-5'-P-CCNC: 24 U/L (ref 9–39)
BACTERIA #/AREA URNS AUTO: ABNORMAL /HPF
BASOPHILS # BLD AUTO: 0.04 X10*3/UL (ref 0–0.1)
BASOPHILS NFR BLD AUTO: 0.3 %
BILIRUB SERPL-MCNC: 0.8 MG/DL (ref 0–1.2)
BILIRUB UR STRIP.AUTO-MCNC: NEGATIVE MG/DL
BUN SERPL-MCNC: 104 MG/DL (ref 6–23)
CALCIUM SERPL-MCNC: 8.2 MG/DL (ref 8.6–10.3)
CHLORIDE SERPL-SCNC: 126 MMOL/L (ref 98–107)
CO2 SERPL-SCNC: 29 MMOL/L (ref 21–32)
COLOR UR: ABNORMAL
CREAT SERPL-MCNC: 2.05 MG/DL (ref 0.5–1.05)
EOSINOPHIL # BLD AUTO: 0.07 X10*3/UL (ref 0–0.4)
EOSINOPHIL NFR BLD AUTO: 0.6 %
ERYTHROCYTE [DISTWIDTH] IN BLOOD BY AUTOMATED COUNT: 15.2 % (ref 11.5–14.5)
GFR SERPL CREATININE-BSD FRML MDRD: 23 ML/MIN/1.73M*2
GLUCOSE SERPL-MCNC: 217 MG/DL (ref 74–99)
GLUCOSE UR STRIP.AUTO-MCNC: NEGATIVE MG/DL
HCT VFR BLD AUTO: 48 % (ref 36–46)
HGB BLD-MCNC: 13.6 G/DL (ref 12–16)
HOLD SPECIMEN: NORMAL
HYALINE CASTS #/AREA URNS AUTO: ABNORMAL /LPF
IMM GRANULOCYTES # BLD AUTO: 0.03 X10*3/UL (ref 0–0.5)
IMM GRANULOCYTES NFR BLD AUTO: 0.2 % (ref 0–0.9)
INR PPP: 1.6 (ref 0.9–1.1)
KETONES UR STRIP.AUTO-MCNC: NEGATIVE MG/DL
LACTATE SERPL-SCNC: 1.5 MMOL/L (ref 0.4–2)
LACTATE SERPL-SCNC: 2.2 MMOL/L (ref 0.4–2)
LEUKOCYTE ESTERASE UR QL STRIP.AUTO: ABNORMAL
LYMPHOCYTES # BLD AUTO: 0.63 X10*3/UL (ref 0.8–3)
LYMPHOCYTES NFR BLD AUTO: 5.1 %
MAGNESIUM SERPL-MCNC: 2.36 MG/DL (ref 1.6–2.4)
MCH RBC QN AUTO: 30.7 PG (ref 26–34)
MCHC RBC AUTO-ENTMCNC: 28.3 G/DL (ref 32–36)
MCV RBC AUTO: 108 FL (ref 80–100)
MONOCYTES # BLD AUTO: 0.37 X10*3/UL (ref 0.05–0.8)
MONOCYTES NFR BLD AUTO: 3 %
MUCOUS THREADS #/AREA URNS AUTO: ABNORMAL /LPF
NEUTROPHILS # BLD AUTO: 11.29 X10*3/UL (ref 1.6–5.5)
NEUTROPHILS NFR BLD AUTO: 90.8 %
NITRITE UR QL STRIP.AUTO: NEGATIVE
NRBC BLD-RTO: 0 /100 WBCS (ref 0–0)
PH UR STRIP.AUTO: 5 [PH]
PLATELET # BLD AUTO: 124 X10*3/UL (ref 150–450)
POTASSIUM SERPL-SCNC: 4.1 MMOL/L (ref 3.5–5.3)
PROT SERPL-MCNC: 5.3 G/DL (ref 6.4–8.2)
PROT UR STRIP.AUTO-MCNC: ABNORMAL MG/DL
PROTHROMBIN TIME: 17.6 SECONDS (ref 9.8–12.8)
RBC # BLD AUTO: 4.43 X10*6/UL (ref 4–5.2)
RBC # UR STRIP.AUTO: NEGATIVE /UL
RBC #/AREA URNS AUTO: >20 /HPF
SODIUM SERPL-SCNC: 169 MMOL/L (ref 136–145)
SP GR UR STRIP.AUTO: 1.02
SQUAMOUS #/AREA URNS AUTO: ABNORMAL /HPF
UROBILINOGEN UR STRIP.AUTO-MCNC: <2 MG/DL
WBC # BLD AUTO: 12.4 X10*3/UL (ref 4.4–11.3)
WBC #/AREA URNS AUTO: >50 /HPF
WBC CLUMPS #/AREA URNS AUTO: ABNORMAL /HPF

## 2023-12-03 PROCEDURE — 80053 COMPREHEN METABOLIC PANEL: CPT

## 2023-12-03 PROCEDURE — 99292 CRITICAL CARE ADDL 30 MIN: CPT | Mod: 25 | Performed by: EMERGENCY MEDICINE

## 2023-12-03 PROCEDURE — 73130 X-RAY EXAM OF HAND: CPT | Mod: LEFT SIDE | Performed by: RADIOLOGY

## 2023-12-03 PROCEDURE — 81001 URINALYSIS AUTO W/SCOPE: CPT | Performed by: NURSE PRACTITIONER

## 2023-12-03 PROCEDURE — 84443 ASSAY THYROID STIM HORMONE: CPT | Performed by: STUDENT IN AN ORGANIZED HEALTH CARE EDUCATION/TRAINING PROGRAM

## 2023-12-03 PROCEDURE — 87636 SARSCOV2 & INF A&B AMP PRB: CPT | Performed by: STUDENT IN AN ORGANIZED HEALTH CARE EDUCATION/TRAINING PROGRAM

## 2023-12-03 PROCEDURE — 83605 ASSAY OF LACTIC ACID: CPT | Performed by: EMERGENCY MEDICINE

## 2023-12-03 PROCEDURE — 71045 X-RAY EXAM CHEST 1 VIEW: CPT | Performed by: RADIOLOGY

## 2023-12-03 PROCEDURE — 2500000005 HC RX 250 GENERAL PHARMACY W/O HCPCS

## 2023-12-03 PROCEDURE — 93010 ELECTROCARDIOGRAM REPORT: CPT | Performed by: STUDENT IN AN ORGANIZED HEALTH CARE EDUCATION/TRAINING PROGRAM

## 2023-12-03 PROCEDURE — 36415 COLL VENOUS BLD VENIPUNCTURE: CPT | Performed by: NURSE PRACTITIONER

## 2023-12-03 PROCEDURE — 2500000004 HC RX 250 GENERAL PHARMACY W/ HCPCS (ALT 636 FOR OP/ED): Performed by: EMERGENCY MEDICINE

## 2023-12-03 PROCEDURE — 99285 EMERGENCY DEPT VISIT HI MDM: CPT | Performed by: STUDENT IN AN ORGANIZED HEALTH CARE EDUCATION/TRAINING PROGRAM

## 2023-12-03 PROCEDURE — 74176 CT ABD & PELVIS W/O CONTRAST: CPT | Performed by: RADIOLOGY

## 2023-12-03 PROCEDURE — 99291 CRITICAL CARE FIRST HOUR: CPT | Performed by: STUDENT IN AN ORGANIZED HEALTH CARE EDUCATION/TRAINING PROGRAM

## 2023-12-03 PROCEDURE — 99291 CRITICAL CARE FIRST HOUR: CPT | Performed by: INTERNAL MEDICINE

## 2023-12-03 PROCEDURE — 87186 SC STD MICRODIL/AGAR DIL: CPT | Mod: GEALAB | Performed by: NURSE PRACTITIONER

## 2023-12-03 PROCEDURE — 2500000005 HC RX 250 GENERAL PHARMACY W/O HCPCS: Performed by: NURSE PRACTITIONER

## 2023-12-03 PROCEDURE — 87040 BLOOD CULTURE FOR BACTERIA: CPT | Mod: GEALAB | Performed by: NURSE PRACTITIONER

## 2023-12-03 PROCEDURE — 85730 THROMBOPLASTIN TIME PARTIAL: CPT | Performed by: NURSE PRACTITIONER

## 2023-12-03 PROCEDURE — 87081 CULTURE SCREEN ONLY: CPT | Performed by: STUDENT IN AN ORGANIZED HEALTH CARE EDUCATION/TRAINING PROGRAM

## 2023-12-03 PROCEDURE — 96365 THER/PROPH/DIAG IV INF INIT: CPT

## 2023-12-03 PROCEDURE — 83605 ASSAY OF LACTIC ACID: CPT | Performed by: NURSE PRACTITIONER

## 2023-12-03 PROCEDURE — 37799 UNLISTED PX VASCULAR SURGERY: CPT | Performed by: STUDENT IN AN ORGANIZED HEALTH CARE EDUCATION/TRAINING PROGRAM

## 2023-12-03 PROCEDURE — 86901 BLOOD TYPING SEROLOGIC RH(D): CPT

## 2023-12-03 PROCEDURE — 85610 PROTHROMBIN TIME: CPT | Performed by: NURSE PRACTITIONER

## 2023-12-03 PROCEDURE — 99291 CRITICAL CARE FIRST HOUR: CPT | Mod: 25 | Performed by: EMERGENCY MEDICINE

## 2023-12-03 PROCEDURE — 80053 COMPREHEN METABOLIC PANEL: CPT | Performed by: NURSE PRACTITIONER

## 2023-12-03 PROCEDURE — 93010 ELECTROCARDIOGRAM REPORT: CPT | Performed by: INTERNAL MEDICINE

## 2023-12-03 PROCEDURE — 36556 INSERT NON-TUNNEL CV CATH: CPT | Performed by: EMERGENCY MEDICINE

## 2023-12-03 PROCEDURE — 87075 CULTR BACTERIA EXCEPT BLOOD: CPT | Mod: 59,GEALAB | Performed by: NURSE PRACTITIONER

## 2023-12-03 PROCEDURE — 87086 URINE CULTURE/COLONY COUNT: CPT | Mod: GEALAB | Performed by: NURSE PRACTITIONER

## 2023-12-03 PROCEDURE — 96375 TX/PRO/DX INJ NEW DRUG ADDON: CPT

## 2023-12-03 PROCEDURE — 96372 THER/PROPH/DIAG INJ SC/IM: CPT | Mod: 59

## 2023-12-03 PROCEDURE — 0HBGXZZ EXCISION OF LEFT HAND SKIN, EXTERNAL APPROACH: ICD-10-PCS | Performed by: STUDENT IN AN ORGANIZED HEALTH CARE EDUCATION/TRAINING PROGRAM

## 2023-12-03 PROCEDURE — 80069 RENAL FUNCTION PANEL: CPT | Mod: CCI | Performed by: STUDENT IN AN ORGANIZED HEALTH CARE EDUCATION/TRAINING PROGRAM

## 2023-12-03 PROCEDURE — 2500000004 HC RX 250 GENERAL PHARMACY W/ HCPCS (ALT 636 FOR OP/ED)

## 2023-12-03 PROCEDURE — 80202 ASSAY OF VANCOMYCIN: CPT | Performed by: STUDENT IN AN ORGANIZED HEALTH CARE EDUCATION/TRAINING PROGRAM

## 2023-12-03 PROCEDURE — 83735 ASSAY OF MAGNESIUM: CPT | Performed by: NURSE PRACTITIONER

## 2023-12-03 PROCEDURE — 99291 CRITICAL CARE FIRST HOUR: CPT | Mod: 25 | Performed by: STUDENT IN AN ORGANIZED HEALTH CARE EDUCATION/TRAINING PROGRAM

## 2023-12-03 PROCEDURE — 96365 THER/PROPH/DIAG IV INF INIT: CPT | Mod: 59

## 2023-12-03 PROCEDURE — 85025 COMPLETE CBC W/AUTO DIFF WBC: CPT | Performed by: NURSE PRACTITIONER

## 2023-12-03 PROCEDURE — 71250 CT THORAX DX C-: CPT | Performed by: RADIOLOGY

## 2023-12-03 PROCEDURE — 2020000001 HC ICU ROOM DAILY

## 2023-12-03 PROCEDURE — 71045 X-RAY EXAM CHEST 1 VIEW: CPT | Mod: 59,FY

## 2023-12-03 PROCEDURE — 2500000004 HC RX 250 GENERAL PHARMACY W/ HCPCS (ALT 636 FOR OP/ED): Performed by: NURSE PRACTITIONER

## 2023-12-03 PROCEDURE — 73130 X-RAY EXAM OF HAND: CPT | Mod: LT,FY

## 2023-12-03 PROCEDURE — 85025 COMPLETE CBC W/AUTO DIFF WBC: CPT | Performed by: STUDENT IN AN ORGANIZED HEALTH CARE EDUCATION/TRAINING PROGRAM

## 2023-12-03 PROCEDURE — 87075 CULTR BACTERIA EXCEPT BLOOD: CPT | Mod: GEALAB | Performed by: NURSE PRACTITIONER

## 2023-12-03 PROCEDURE — 74176 CT ABD & PELVIS W/O CONTRAST: CPT

## 2023-12-03 PROCEDURE — 51702 INSERT TEMP BLADDER CATH: CPT

## 2023-12-03 PROCEDURE — 96367 TX/PROPH/DG ADDL SEQ IV INF: CPT

## 2023-12-03 RX ORDER — MAGNESIUM SULFATE HEPTAHYDRATE 40 MG/ML
2 INJECTION, SOLUTION INTRAVENOUS ONCE
Status: COMPLETED | OUTPATIENT
Start: 2023-12-03 | End: 2023-12-03

## 2023-12-03 RX ORDER — NOREPINEPHRINE BITARTRATE/D5W 8 MG/250ML
0-3.3 PLASTIC BAG, INJECTION (ML) INTRAVENOUS CONTINUOUS
Status: DISCONTINUED | OUTPATIENT
Start: 2023-12-03 | End: 2023-12-06

## 2023-12-03 RX ORDER — MAGNESIUM SULFATE HEPTAHYDRATE 40 MG/ML
INJECTION, SOLUTION INTRAVENOUS
Status: COMPLETED
Start: 2023-12-03 | End: 2023-12-03

## 2023-12-03 RX ORDER — NOREPINEPHRINE BITARTRATE 0.03 MG/ML
.01-.5 INJECTION, SOLUTION INTRAVENOUS CONTINUOUS
Status: DISCONTINUED | OUTPATIENT
Start: 2023-12-03 | End: 2023-12-03 | Stop reason: HOSPADM

## 2023-12-03 RX ORDER — ATROPINE SULFATE 0.1 MG/ML
INJECTION INTRAVENOUS
Status: DISPENSED
Start: 2023-12-03 | End: 2023-12-04

## 2023-12-03 RX ORDER — HEPARIN SODIUM 5000 [USP'U]/ML
5000 INJECTION, SOLUTION INTRAVENOUS; SUBCUTANEOUS EVERY 8 HOURS
Status: CANCELLED | OUTPATIENT
Start: 2023-12-03

## 2023-12-03 RX ORDER — LIDOCAINE HYDROCHLORIDE 10 MG/ML
10 INJECTION INFILTRATION; PERINEURAL ONCE
Status: COMPLETED | OUTPATIENT
Start: 2023-12-03 | End: 2023-12-03

## 2023-12-03 RX ORDER — HEPARIN SODIUM 5000 [USP'U]/ML
5000 INJECTION, SOLUTION INTRAVENOUS; SUBCUTANEOUS EVERY 8 HOURS
Status: DISCONTINUED | OUTPATIENT
Start: 2023-12-03 | End: 2023-12-03

## 2023-12-03 RX ORDER — VANCOMYCIN HYDROCHLORIDE 1 G/200ML
1000 INJECTION, SOLUTION INTRAVENOUS ONCE
Status: COMPLETED | OUTPATIENT
Start: 2023-12-03 | End: 2023-12-03

## 2023-12-03 RX ADMIN — SODIUM CHLORIDE 1000 ML: 9 INJECTION, SOLUTION INTRAVENOUS at 14:28

## 2023-12-03 RX ADMIN — SODIUM CHLORIDE 1539 ML: 9 INJECTION, SOLUTION INTRAVENOUS at 14:45

## 2023-12-03 RX ADMIN — MAGNESIUM SULFATE HEPTAHYDRATE 2 G: 2 INJECTION, SOLUTION INTRAVENOUS at 21:25

## 2023-12-03 RX ADMIN — SODIUM CHLORIDE 500 ML: 9 INJECTION, SOLUTION INTRAVENOUS at 15:52

## 2023-12-03 RX ADMIN — NOREPINEPHRINE BITARTRATE 0.09 MCG/KG/MIN: 8 INJECTION, SOLUTION INTRAVENOUS at 20:41

## 2023-12-03 RX ADMIN — VANCOMYCIN HYDROCHLORIDE 1000 MG: 1 INJECTION, SOLUTION INTRAVENOUS at 15:27

## 2023-12-03 RX ADMIN — PIPERACILLIN SODIUM AND TAZOBACTAM SODIUM 2.25 G: 2; .25 INJECTION, SOLUTION INTRAVENOUS at 22:32

## 2023-12-03 RX ADMIN — MAGNESIUM SULFATE HEPTAHYDRATE 2 G: 40 INJECTION, SOLUTION INTRAVENOUS at 21:25

## 2023-12-03 RX ADMIN — NOREPINEPHRINE BITARTRATE 0.01 MCG/KG/MIN: 0.03 INJECTION, SOLUTION INTRAVENOUS at 17:17

## 2023-12-03 RX ADMIN — AMIODARONE HYDROCHLORIDE 300 MG: 1.5 INJECTION, SOLUTION INTRAVENOUS at 21:30

## 2023-12-03 RX ADMIN — AMIODARONE HYDROCHLORIDE 360 MG: 1.8 INJECTION, SOLUTION INTRAVENOUS at 21:36

## 2023-12-03 RX ADMIN — LIDOCAINE HYDROCHLORIDE 10 ML: 10 INJECTION, SOLUTION INFILTRATION; PERINEURAL at 20:37

## 2023-12-03 RX ADMIN — PIPERACILLIN SODIUM AND TAZOBACTAM SODIUM 3.38 G: 3; .375 INJECTION, SOLUTION INTRAVENOUS at 14:48

## 2023-12-03 SDOH — SOCIAL STABILITY: SOCIAL INSECURITY: DO YOU FEEL ANYONE HAS EXPLOITED OR TAKEN ADVANTAGE OF YOU FINANCIALLY OR OF YOUR PERSONAL PROPERTY?: UNABLE TO ASSESS

## 2023-12-03 SDOH — SOCIAL STABILITY: SOCIAL INSECURITY: DOES ANYONE TRY TO KEEP YOU FROM HAVING/CONTACTING OTHER FRIENDS OR DOING THINGS OUTSIDE YOUR HOME?: UNABLE TO ASSESS

## 2023-12-03 SDOH — SOCIAL STABILITY: SOCIAL INSECURITY: ARE THERE ANY APPARENT SIGNS OF INJURIES/BEHAVIORS THAT COULD BE RELATED TO ABUSE/NEGLECT?: UNABLE TO ASSESS

## 2023-12-03 SDOH — SOCIAL STABILITY: SOCIAL INSECURITY: HAVE YOU HAD THOUGHTS OF HARMING ANYONE ELSE?: UNABLE TO ASSESS

## 2023-12-03 SDOH — SOCIAL STABILITY: SOCIAL INSECURITY: HAS ANYONE EVER THREATENED TO HURT YOUR FAMILY OR YOUR PETS?: UNABLE TO ASSESS

## 2023-12-03 SDOH — SOCIAL STABILITY: SOCIAL INSECURITY: ARE YOU OR HAVE YOU BEEN THREATENED OR ABUSED PHYSICALLY, EMOTIONALLY, OR SEXUALLY BY ANYONE?: UNABLE TO ASSESS

## 2023-12-03 SDOH — SOCIAL STABILITY: SOCIAL INSECURITY: ABUSE: ADULT

## 2023-12-03 SDOH — SOCIAL STABILITY: SOCIAL INSECURITY: DO YOU FEEL UNSAFE GOING BACK TO THE PLACE WHERE YOU ARE LIVING?: UNABLE TO ASSESS

## 2023-12-03 ASSESSMENT — COLUMBIA-SUICIDE SEVERITY RATING SCALE - C-SSRS
1. IN THE PAST MONTH, HAVE YOU WISHED YOU WERE DEAD OR WISHED YOU COULD GO TO SLEEP AND NOT WAKE UP?: NO
6. HAVE YOU EVER DONE ANYTHING, STARTED TO DO ANYTHING, OR PREPARED TO DO ANYTHING TO END YOUR LIFE?: NO
2. HAVE YOU ACTUALLY HAD ANY THOUGHTS OF KILLING YOURSELF?: NO

## 2023-12-03 ASSESSMENT — LIFESTYLE VARIABLES
AUDIT-C TOTAL SCORE: -1
HAVE YOU EVER FELT YOU SHOULD CUT DOWN ON YOUR DRINKING: NO
EVER FELT BAD OR GUILTY ABOUT YOUR DRINKING: NO
HOW OFTEN DO YOU HAVE 6 OR MORE DRINKS ON ONE OCCASION: PATIENT DECLINED
HOW MANY STANDARD DRINKS CONTAINING ALCOHOL DO YOU HAVE ON A TYPICAL DAY: PATIENT DECLINED
SKIP TO QUESTIONS 9-10: 0
HAVE PEOPLE ANNOYED YOU BY CRITICIZING YOUR DRINKING: NO
EVER HAD A DRINK FIRST THING IN THE MORNING TO STEADY YOUR NERVES TO GET RID OF A HANGOVER: NO
REASON UNABLE TO ASSESS: YES
HOW OFTEN DO YOU HAVE A DRINK CONTAINING ALCOHOL: PATIENT DECLINED
REASON UNABLE TO ASSESS: NO
AUDIT-C TOTAL SCORE: -1

## 2023-12-03 ASSESSMENT — PAIN DESCRIPTION - PROGRESSION
CLINICAL_PROGRESSION: OTHER (COMMENT)
CLINICAL_PROGRESSION: NOT CHANGED

## 2023-12-03 ASSESSMENT — ACTIVITIES OF DAILY LIVING (ADL)
PATIENT'S MEMORY ADEQUATE TO SAFELY COMPLETE DAILY ACTIVITIES?: NO
GROOMING: DEPENDENT
FEEDING YOURSELF: UNABLE TO ASSESS
TOILETING: DEPENDENT
JUDGMENT_ADEQUATE_SAFELY_COMPLETE_DAILY_ACTIVITIES: NO
BATHING: DEPENDENT
WALKS IN HOME: UNABLE TO ASSESS
ADEQUATE_TO_COMPLETE_ADL: YES
HEARING - LEFT EAR: FUNCTIONAL
HEARING - RIGHT EAR: FUNCTIONAL
DRESSING YOURSELF: DEPENDENT

## 2023-12-03 ASSESSMENT — PAIN - FUNCTIONAL ASSESSMENT
PAIN_FUNCTIONAL_ASSESSMENT: UNABLE TO SELF-REPORT
PAIN_FUNCTIONAL_ASSESSMENT: UNABLE TO SELF-REPORT
PAIN_FUNCTIONAL_ASSESSMENT: 0-10

## 2023-12-03 ASSESSMENT — COGNITIVE AND FUNCTIONAL STATUS - GENERAL: PATIENT BASELINE BEDBOUND: UNABLE TO ASSESS AT THIS TIME

## 2023-12-03 ASSESSMENT — PAIN SCALES - GENERAL
PAINLEVEL_OUTOF10: 0 - NO PAIN
PAINLEVEL_OUTOF10: 0 - NO PAIN

## 2023-12-03 NOTE — PROGRESS NOTES
Expected patient en route to [facility] ED from [facility]    L thumb infection. Sucks thumb. Greenish drainage frfom nail down to hand, red, warm. CT pending. Septic shock. 2L IVF, central line, levo.     No intubation, no compressions. DNR CCA    Orhto hand accepted for eval in ED due to hand.     Rachelle Diamond MD  Emergency Medicine

## 2023-12-03 NOTE — ED PROVIDER NOTES
[facility]  ED  Encounter Note  Admit Date/RoomTime: 12/3/2023  2:00 PM  ED Room: [place] [address]  NAME: Nay Frost  : 1940  MRN: [id number]     Chief Complaint:  Infection (Patients presents from [facility] with infected left thumbnail. Per EMS patient chews on nail consistently.)    HISTORY OF PRESENT ILLNESS        Nay Frost is a 83 y.o. female who presents to the ED for evaluation of infection. Patient presented to the ED hypotensive with bp systolic 70's. Patient was noted to have left thumbnail with greenish discharge. Facility reports that patient chews on nail consistently. Unclear if pt has had fever at the facility. Non verbal.    ROS   Pertinent positives and negatives are stated within HPI, all other systems reviewed and are negative.    Past Medical History:  has a past medical history of Alzheimer's disease (CMS/[facility]), Amnesia, COVID-19, Crohn's disease (CMS/[facility]), Dementia (CMS/[facility]), Hip fracture, right (CMS/[facility]) (10/09/2023), Hyperlipidemia, Hypertension, Insomnia, Migraine, Osteoporosis, and Seizures (CMS/[facility]).    Surgical History:  has no past surgical history on file.    Social History:  reports that she has never smoked. She has never been exposed to tobacco smoke. She has never used smokeless tobacco. She reports that she does not currently use alcohol. She reports that she does not currently use drugs.    Family History: family history is not on file.     Allergies: Bee pollen, Bee venom protein (honey bee), Erythromycin, [place] flavor, Iodine, Nut - unspecified, Pneumococcal vaccine, Shellfish derived, Strawberry, Sulfa (sulfonamide antibiotics), and Tree nuts    PHYSICAL EXAM   Oxygen Saturation Interpretation: Normal.    Physical Exam  Constitutional/General: Alert and oriented x1, thin cachetic female.  HEENT:  NC/NT. PERRLA.  Airway patent.  Neck: Supple, full ROM. No midline vertebral tenderness or crepitus.   Respiratory: Lung  sounds clear to auscultation bilaterally. No wheezes, rhonchi or stridor. Not in respiratory distress.  CV:  Regular rate. Regular rhythm. No murmurs or rubs. 2+ distal pulses.  GI:  Abdomen soft, non-tender, non-distended. +BS. No rebound, guarding, or rigidity. No pulsatile masses.  Musculoskeletal: Moves all extremities x 4. Warm and well perfused. Capillary refill <3 seconds  Integument: Skin warm and dry. No rashes.   Neurologic: Alert and oriented to self. Left thumb with discoloration.   Psychiatric: Normal affect.    Lab / Imaging Results   (All laboratory and radiology results have been personally reviewed by myself)  Labs:  Results for orders placed or performed during the hospital encounter of 12/03/23   Blood Culture    Specimen: Peripheral Venipuncture; Blood culture   Result Value Ref Range    Blood Culture No growth at 1 day    Blood Culture    Specimen: Peripheral Venipuncture; Blood culture   Result Value Ref Range    Blood Culture No growth at 1 day    Urine Culture    Specimen: Straight Catheter; Urine   Result Value Ref Range    Urine Culture 20,000 - 80,000 Enteric bacilli (A)    CBC and Auto Differential   Result Value Ref Range    WBC 12.4 (H) 4.4 - 11.3 x10*3/uL    nRBC 0.0 0.0 - 0.0 /100 WBCs    RBC 4.43 4.00 - 5.20 x10*6/uL    Hemoglobin 13.6 12.0 - 16.0 g/dL    Hematocrit 48.0 (H) 36.0 - 46.0 %     (H) 80 - 100 fL    MCH 30.7 26.0 - 34.0 pg    MCHC 28.3 (L) 32.0 - 36.0 g/dL    RDW 15.2 (H) 11.5 - 14.5 %    Platelets 124 (L) 150 - 450 x10*3/uL    Neutrophils % 90.8 40.0 - 80.0 %    Immature Granulocytes %, Automated 0.2 0.0 - 0.9 %    Lymphocytes % 5.1 13.0 - 44.0 %    Monocytes % 3.0 2.0 - 10.0 %    Eosinophils % 0.6 0.0 - 6.0 %    Basophils % 0.3 0.0 - 2.0 %    Neutrophils Absolute 11.29 (H) 1.60 - 5.50 x10*3/uL    Immature Granulocytes Absolute, Automated 0.03 0.00 - 0.50 x10*3/uL    Lymphocytes Absolute 0.63 (L) 0.80 - 3.00 x10*3/uL    Monocytes Absolute 0.37 0.05 - 0.80 x10*3/uL     Eosinophils Absolute 0.07 0.00 - 0.40 x10*3/uL    Basophils Absolute 0.04 0.00 - 0.10 x10*3/uL   Magnesium   Result Value Ref Range    Magnesium 2.36 1.60 - 2.40 mg/dL   Comprehensive metabolic panel   Result Value Ref Range    Glucose 217 (H) 74 - 99 mg/dL    Sodium 169 (HH) 136 - 145 mmol/L    Potassium 4.1 3.5 - 5.3 mmol/L    Chloride 126 (H) 98 - 107 mmol/L    Bicarbonate 29 21 - 32 mmol/L    Anion Gap 18 10 - 20 mmol/L    Urea Nitrogen 104 (HH) 6 - 23 mg/dL    Creatinine 2.05 (H) 0.50 - 1.05 mg/dL    eGFR 23 (L) >60 mL/min/1.73m*2    Calcium 8.2 (L) 8.6 - 10.3 mg/dL    Albumin 2.7 (L) 3.4 - 5.0 g/dL    Alkaline Phosphatase 88 33 - 136 U/L    Total Protein 5.3 (L) 6.4 - 8.2 g/dL    AST 24 9 - 39 U/L    Bilirubin, Total 0.8 0.0 - 1.2 mg/dL    ALT 20 7 - 45 U/L   Lactate   Result Value Ref Range    Lactate 2.2 (H) 0.4 - 2.0 mmol/L   aPTT   Result Value Ref Range    aPTT 39 (H) 27 - 38 seconds   Protime-INR   Result Value Ref Range    Protime 17.6 (H) 9.8 - 12.8 seconds    INR 1.6 (H) 0.9 - 1.1   Lactate   Result Value Ref Range    Lactate 1.5 0.4 - 2.0 mmol/L   Urinalysis with Reflex Microscopic and Culture   Result Value Ref Range    Color, Urine Amisha (N) Straw, Yellow    Appearance, Urine Hazy (N) Clear    Specific Gravity, Urine 1.018 1.005 - 1.035    pH, Urine 5.0 5.0, 5.5, 6.0, 6.5, 7.0, 7.5, 8.0    Protein, Urine 30 (1+) (N) NEGATIVE mg/dL    Glucose, Urine NEGATIVE NEGATIVE mg/dL    Blood, Urine NEGATIVE NEGATIVE    Ketones, Urine NEGATIVE NEGATIVE mg/dL    Bilirubin, Urine NEGATIVE NEGATIVE    Urobilinogen, Urine <2.0 <2.0 mg/dL    Nitrite, Urine NEGATIVE NEGATIVE    Leukocyte Esterase, Urine LARGE (3+) (A) NEGATIVE   Extra Urine Gray Tube   Result Value Ref Range    Extra Tube Hold for add-ons.    Microscopic Only, Urine   Result Value Ref Range    WBC, Urine >50 (A) 1-5, NONE /HPF    WBC Clumps, Urine MANY Reference range not established. /HPF    RBC, Urine >20 (A) NONE, 1-2, 3-5 /HPF    Squamous  Epithelial Cells, Urine 1-9 (SPARSE) Reference range not established. /HPF    Bacteria, Urine 4+ (A) NONE SEEN /HPF    Mucus, Urine 2+ Reference range not established. /LPF    Hyaline Casts, Urine 3+ (A) NONE /LPF     Imaging:  All Radiology results interpreted by Radiologist unless otherwise noted.  CT chest abdomen pelvis wo IV contrast   Final Result   Nodular consolidation ground-glass opacities in the right upper and   lower lobes, suspicious for pneumonia. Follow-up chest CT in 3 months   is recommended to assess for resolution.        Trace right pleural effusion.        No acute abdominal or pelvic abnormality.        Age indeterminate multiple thoracic and L5 compression fractures.        2.8 cm left ovarian cyst. Follow-up nonemergent pelvic ultrasound is   recommended for characterization.        MACRO:   Critical Finding:  See findings. Notification was initiated on   12/3/2023 at 7:35 pm by  Arline Marie.  (**-YCF-**) Instructions:        Signed by: Arline Marie 12/3/2023 7:35 PM   Dictation workstation:   [id number]      XR chest 1 view   Final Result   No focal infiltrate or pneumothorax is identified.        MACRO:   None.        Signed by: Louis Morel 12/3/2023 3:02 PM   Dictation workstation:   [id number]      XR hand left 3+ views   Final Result   1. No fracture or dislocation.   2. Degenerative changes, as described above.        MACRO:   None.        Signed by: Louis Morel 12/3/2023 3:02 PM   Dictation workstation:   [id number]          ED Course / Medical Decision Making     Medications   sodium chloride 0.9 % bolus 1,000 mL (0 mL intravenous Stopped 12/3/23 1457)   sodium chloride 0.9 % bolus 1,539 mL (0 mL intravenous Stopped 12/3/23 1515)   piperacillin-tazobactam-dextrose (Zosyn) IV 3.375 g (0 g intravenous Stopped 12/3/23 1518)   vancomycin in dextrose 5 % (Vancocin) IVPB 1,000 mg (0 mg intravenous Stopped 12/3/23 1627)   sodium chloride 0.9 % bolus 500 mL (0 mL intravenous  Stopped 12/3/23 1622)     ED Course as of 12/05/23 1554   Sun Dec 03, 2023   1539 Lab called cmp appears to be in error. Sodium elevated 159. Will have lab redrawn. After 30 ml/kg fluid bolus (1500 ), bp dropped to 79. Will start patient on another 500 ml fluid bolus, check for urinary retention via bladder scan and start Levophed if bp does not appear to improve to target map 60 to 65 [PK]   1620 Daughter Zeenat wants code status to be dnr cca no intubation cpr otherwise wants everything done [PK]      ED Course User Index  [PK] Zeenat Schwarzjame, APRN-CNP         Diagnoses as of 12/05/23 1554   SIRS (systemic inflammatory response syndrome) (CMS/HCC)   Hypotensive episode   Finger infection   Septic shock (CMS/HCC)   HCAP (healthcare-associated pneumonia)   Bladder infection   Hypernatremia     Re-examination:    Patient’s condition stable.    Consult(s):   EKG   Rate 39 dec 3 2023 1818  Pr interval 214 ms  Qrs duration 98 ms  Qt qtc 556/447 ms  Prt axes 87 -65 56  Marked sinus bradycardia with 1st degre eav block with premature sva complexx  Incomplete rbbb     Procedure(s):   Dec 3 2023 at 1507  Rate 56 bpm  Pr interval 220 ms  Qrs duration 102 ms  Qt qtc 492/474 ms  Prt axes 89 -55 26  Sinus bradycardia with 1st degree av block with premature supraventricular complexes  Pulm disease pattern  Incomplete rbbb  Left anterior fascular block  No other ekgs for comparision     TREATMENT IN THE ED     Nay Frost is a 83 y.o. female who presents to the ED for evaluation of infection. Patient presented to the ED hypotensive with bp systolic 70's. Patient was noted to have left thumbnail with greenish discharge. Facility reports that patient chews on nail consistently. Unclear if pt has had fever at the facility. Non verbal.    Treatment in the ED, patient immediately received cultures and iv fluid bolus 30/kg and then additional 500 ml before intiating pressor. She was immediately initiated on Zosyn and  Vanco for suspected sepsis source unknown. Could have been her left thumb hand but felt unlikely. CXR negative for acute findings, suspected pneumonia. Ortho here at [facility] was not available to take left hand/thumb to OR until Wednesday and pt was transferred to [facility] to be evaluated by ortho Dr Jones and also admission to the [facility].     She was also noted to have UTI infection.   Wbc 12.4  Sodium 169. Labs were redrawn as it felt it could have been a lab error but patient to have extremely dry mucous membranes and poor skin turgor. Bladder scan per nursing was less than 175 ml.   She was also noted to have several episodes of bradycardia noted to be sinus rhythm. Levophed was initiated to map of 65. Attending placed a central line for better iv access. Family /daughter Zeenat contacted who wanted code status dnr cca.     Patient was transferred to [facility] ED via critical care transport.  Ddx: septic shock hcap pneumonia left hand cellulitis hypernatremia    ASSESSMENT     1. SIRS (systemic inflammatory response syndrome) (CMS/[facility])    2. Hypotensive episode    3. Finger infection    4. Septic shock (CMS/[facility])    5. HCAP (healthcare-associated pneumonia)    6. Bladder infection    7. Hypernatremia      PLAN     Transfer to [facility] for eval by ortho, will need admission to MICU, DNR CCA    New Medications     New Medications Ordered This Visit   Medications    sodium chloride 0.9 % bolus 1,000 mL    sodium chloride 0.9 % bolus 1,539 mL    piperacillin-tazobactam-dextrose (Zosyn) IV 3.375 g     Order Specific Question:   Dosing of this medication varies based on severity of illness. Does this patient have sepsis or concern for sepsis (probable or documented infection plus systemic manifestations of infection)?     Answer:   Yes     Order Specific Question:   Suspected Indication (Select all that apply)     Answer:   Cellulitis, Skin and Soft Tissue     Order Specific Question:   Type of Therapy     Answer:   Empiric     vancomycin in dextrose 5 % (Vancocin) IVPB 1,000 mg     Order Specific Question:   Dosing of this medication varies based on severity of illness. Does this patient have sepsis or concern for sepsis (probable or documented infection plus systemic manifestations of infection)?     Answer:   Yes     Order Specific Question:   Suspected Indication (Select all that apply)     Answer:   Cellulitis, Skin and Soft Tissue     Order Specific Question:   Type of Therapy     Answer:   Empiric    sodium chloride 0.9 % bolus 500 mL     Electronically signed by LILIANA Mcleod   DD:   **This report was transcribed using voice recognition software. Every effort was made to ensure accuracy; however, inadvertent computerized transcription errors may be present.  END OF ED PROVIDER NOTE    I have performed a substantial proportion of this encounter and all aspects of the MDM were discussed with myself in agreement as follows.   Patient seen and examined presents with a wound on her finger concerning for abscess formation and infection.  On exam the patient triggered a sepsis alert due to very low blood pressures.  The patient is now on verbal usually.  I spoke with the daughter over the phone who states the patient's heart rate has been lower since her hip surgery 3 weeks ago.  Sepsis alert was called, 30/kg of fluid were given but on repeat evaluation her pressures are still in the 80-70 systolic range.  Right femoral central line was placed.  See note for details.  Patient's heart rate remains in the 40s to 50s range sometimes in the 30s.  Repeat EKG was done.  First EKG interpreted by me shows a heart rate of56 sinus bradycardia with first-degree AV block and some PVC sees, incomplete right bundle branch block left anterior fascicular block is present, left axis deviation normal intervals.  Second EKG interpreted by me shows a heart rate of 39, sinus bradycardia with prolonged MT at 214 ms, second-degree Mobitz type  II present, QTc 4 4 7 ms  Patient mentating at baseline. Antibiotics started, will transfer patient accordingly.   Sepsis reperfusion exam completed on December 4, 2023 at 1800 hours. Plan for hospitalization after transfer.        Aline Leija,   12/04/23 2002       Zeenat Perez, UMM-CNP  12/05/23 1553

## 2023-12-04 ENCOUNTER — APPOINTMENT (OUTPATIENT)
Dept: RADIOLOGY | Facility: HOSPITAL | Age: 83
DRG: 871 | End: 2023-12-04
Payer: COMMERCIAL

## 2023-12-04 ENCOUNTER — APPOINTMENT (OUTPATIENT)
Dept: CARDIOLOGY | Facility: HOSPITAL | Age: 83
DRG: 871 | End: 2023-12-04
Payer: COMMERCIAL

## 2023-12-04 LAB
ABO GROUP (TYPE) IN BLOOD: NORMAL
ALBUMIN SERPL BCP-MCNC: 3.1 G/DL (ref 3.4–5)
ALBUMIN SERPL BCP-MCNC: 3.1 G/DL (ref 3.4–5)
ALBUMIN SERPL BCP-MCNC: 3.2 G/DL (ref 3.4–5)
ALBUMIN SERPL BCP-MCNC: 3.2 G/DL (ref 3.4–5)
ALBUMIN SERPL BCP-MCNC: 3.6 G/DL (ref 3.4–5)
ALP SERPL-CCNC: 92 U/L (ref 33–136)
ALP SERPL-CCNC: 97 U/L (ref 33–136)
ALT SERPL W P-5'-P-CCNC: 26 U/L (ref 7–45)
ALT SERPL W P-5'-P-CCNC: 26 U/L (ref 7–45)
ANION GAP BLDA CALCULATED.4IONS-SCNC: 10 MMO/L (ref 10–25)
ANION GAP BLDA CALCULATED.4IONS-SCNC: 16 MMO/L (ref 10–25)
ANION GAP BLDV CALCULATED.4IONS-SCNC: 10 MMOL/L (ref 10–25)
ANION GAP SERPL CALC-SCNC: 10 MMOL/L (ref 10–20)
ANION GAP SERPL CALC-SCNC: 12 MMOL/L (ref 10–20)
ANION GAP SERPL CALC-SCNC: 18 MMOL/L (ref 10–20)
ANION GAP SERPL CALC-SCNC: 20 MMOL/L (ref 10–20)
ANION GAP SERPL CALC-SCNC: 21 MMOL/L (ref 10–20)
ANTIBODY SCREEN: NORMAL
AST SERPL W P-5'-P-CCNC: 28 U/L (ref 9–39)
AST SERPL W P-5'-P-CCNC: 31 U/L (ref 9–39)
BASE EXCESS BLDA CALC-SCNC: -1 MMOL/L (ref -2–3)
BASE EXCESS BLDA CALC-SCNC: -6.3 MMOL/L (ref -2–3)
BASE EXCESS BLDV CALC-SCNC: 0.7 MMOL/L (ref -2–3)
BASOPHILS # BLD AUTO: 0.04 X10*3/UL (ref 0–0.1)
BASOPHILS # BLD AUTO: 0.04 X10*3/UL (ref 0–0.1)
BASOPHILS NFR BLD AUTO: 0.3 %
BASOPHILS NFR BLD AUTO: 0.3 %
BILIRUB DIRECT SERPL-MCNC: 0.3 MG/DL (ref 0–0.3)
BILIRUB SERPL-MCNC: 0.9 MG/DL (ref 0–1.2)
BILIRUB SERPL-MCNC: 0.9 MG/DL (ref 0–1.2)
BODY TEMPERATURE: 37 DEGREES CELSIUS
BUN SERPL-MCNC: 104 MG/DL (ref 6–23)
BUN SERPL-MCNC: 112 MG/DL (ref 6–23)
BUN SERPL-MCNC: 115 MG/DL (ref 6–23)
BUN SERPL-MCNC: 115 MG/DL (ref 6–23)
BUN SERPL-MCNC: 63 MG/DL (ref 6–23)
BUN SERPL-MCNC: 74 MG/DL (ref 6–23)
BUN SERPL-MCNC: 90 MG/DL (ref 6–23)
CA-I BLDA-SCNC: 1.21 MMOL/L (ref 1.1–1.33)
CA-I BLDA-SCNC: 1.22 MMOL/L (ref 1.1–1.33)
CA-I BLDV-SCNC: 1.25 MMOL/L (ref 1.1–1.33)
CALCIUM SERPL-MCNC: 8.4 MG/DL (ref 8.6–10.6)
CALCIUM SERPL-MCNC: 8.8 MG/DL (ref 8.6–10.6)
CALCIUM SERPL-MCNC: 9.1 MG/DL (ref 8.6–10.6)
CALCIUM SERPL-MCNC: 9.2 MG/DL (ref 8.6–10.6)
CALCIUM SERPL-MCNC: 9.2 MG/DL (ref 8.6–10.6)
CHLORIDE BLDA-SCNC: 122 MMOL/L (ref 98–107)
CHLORIDE BLDA-SCNC: 129 MMOL/L (ref 98–107)
CHLORIDE BLDV-SCNC: 130 MMOL/L (ref 98–107)
CHLORIDE SERPL-SCNC: 122 MMOL/L (ref 98–107)
CHLORIDE SERPL-SCNC: 127 MMOL/L (ref 98–107)
CHLORIDE SERPL-SCNC: 129 MMOL/L (ref 98–107)
CHLORIDE SERPL-SCNC: 130 MMOL/L (ref 98–107)
CHLORIDE SERPL-SCNC: 131 MMOL/L (ref 98–107)
CO2 SERPL-SCNC: 18 MMOL/L (ref 21–32)
CO2 SERPL-SCNC: 19 MMOL/L (ref 21–32)
CO2 SERPL-SCNC: 20 MMOL/L (ref 21–32)
CO2 SERPL-SCNC: 28 MMOL/L (ref 21–32)
CO2 SERPL-SCNC: 29 MMOL/L (ref 21–32)
CREAT SERPL-MCNC: 1.46 MG/DL (ref 0.5–1.05)
CREAT SERPL-MCNC: 1.49 MG/DL (ref 0.5–1.05)
CREAT SERPL-MCNC: 1.65 MG/DL (ref 0.5–1.05)
CREAT SERPL-MCNC: 1.8 MG/DL (ref 0.5–1.05)
CREAT SERPL-MCNC: 2.02 MG/DL (ref 0.5–1.05)
CREAT SERPL-MCNC: 2.05 MG/DL (ref 0.5–1.05)
CREAT SERPL-MCNC: 2.05 MG/DL (ref 0.5–1.05)
EOSINOPHIL # BLD AUTO: 0.09 X10*3/UL (ref 0–0.4)
EOSINOPHIL # BLD AUTO: 0.12 X10*3/UL (ref 0–0.4)
EOSINOPHIL NFR BLD AUTO: 0.7 %
EOSINOPHIL NFR BLD AUTO: 0.9 %
ERYTHROCYTE [DISTWIDTH] IN BLOOD BY AUTOMATED COUNT: 15 % (ref 11.5–14.5)
ERYTHROCYTE [DISTWIDTH] IN BLOOD BY AUTOMATED COUNT: 15.1 % (ref 11.5–14.5)
EST. AVERAGE GLUCOSE BLD GHB EST-MCNC: 108 MG/DL
FLUAV RNA RESP QL NAA+PROBE: NOT DETECTED
FLUBV RNA RESP QL NAA+PROBE: NOT DETECTED
GFR SERPL CREATININE-BSD FRML MDRD: 23 ML/MIN/1.73M*2
GFR SERPL CREATININE-BSD FRML MDRD: 23 ML/MIN/1.73M*2
GFR SERPL CREATININE-BSD FRML MDRD: 24 ML/MIN/1.73M*2
GFR SERPL CREATININE-BSD FRML MDRD: 27 ML/MIN/1.73M*2
GFR SERPL CREATININE-BSD FRML MDRD: 30 ML/MIN/1.73M*2
GFR SERPL CREATININE-BSD FRML MDRD: 34 ML/MIN/1.73M*2
GFR SERPL CREATININE-BSD FRML MDRD: 35 ML/MIN/1.73M*2
GLUCOSE BLD MANUAL STRIP-MCNC: 167 MG/DL (ref 74–99)
GLUCOSE BLD MANUAL STRIP-MCNC: 374 MG/DL (ref 74–99)
GLUCOSE BLDA-MCNC: 201 MG/DL (ref 74–99)
GLUCOSE BLDA-MCNC: 516 MG/DL (ref 74–99)
GLUCOSE BLDV-MCNC: 184 MG/DL (ref 74–99)
GLUCOSE SERPL-MCNC: 140 MG/DL (ref 74–99)
GLUCOSE SERPL-MCNC: 152 MG/DL (ref 74–99)
GLUCOSE SERPL-MCNC: 152 MG/DL (ref 74–99)
GLUCOSE SERPL-MCNC: 169 MG/DL (ref 74–99)
GLUCOSE SERPL-MCNC: 182 MG/DL (ref 74–99)
GLUCOSE SERPL-MCNC: 248 MG/DL (ref 74–99)
GLUCOSE SERPL-MCNC: 397 MG/DL (ref 74–99)
HBA1C MFR BLD: 5.4 %
HCO3 BLDA-SCNC: 19.7 MMOL/L (ref 22–26)
HCO3 BLDA-SCNC: 25.3 MMOL/L (ref 22–26)
HCO3 BLDV-SCNC: 27.7 MMOL/L (ref 22–26)
HCT VFR BLD AUTO: 39.3 % (ref 36–46)
HCT VFR BLD AUTO: 39.8 % (ref 36–46)
HCT VFR BLD EST: 32 % (ref 36–46)
HCT VFR BLD EST: 33 % (ref 36–46)
HCT VFR BLD EST: 33 % (ref 36–46)
HGB BLD-MCNC: 11.3 G/DL (ref 12–16)
HGB BLD-MCNC: 12 G/DL (ref 12–16)
HGB BLDA-MCNC: 10.9 G/DL (ref 12–16)
HGB BLDA-MCNC: 11.1 G/DL (ref 12–16)
HGB BLDV-MCNC: 10.7 G/DL (ref 12–16)
IMM GRANULOCYTES # BLD AUTO: 0.03 X10*3/UL (ref 0–0.5)
IMM GRANULOCYTES # BLD AUTO: 0.04 X10*3/UL (ref 0–0.5)
IMM GRANULOCYTES NFR BLD AUTO: 0.2 % (ref 0–0.9)
IMM GRANULOCYTES NFR BLD AUTO: 0.3 % (ref 0–0.9)
INHALED O2 CONCENTRATION: 21 %
INHALED O2 CONCENTRATION: 35 %
INHALED O2 CONCENTRATION: 35 %
INR PPP: 1.4 (ref 0.9–1.1)
LACTATE BLDA-SCNC: 0.9 MMOL/L (ref 0.4–2)
LACTATE BLDA-SCNC: 5.1 MMOL/L (ref 0.4–2)
LACTATE BLDV-SCNC: 1.5 MMOL/L (ref 0.4–2)
LYMPHOCYTES # BLD AUTO: 0.61 X10*3/UL (ref 0.8–3)
LYMPHOCYTES # BLD AUTO: 0.8 X10*3/UL (ref 0.8–3)
LYMPHOCYTES NFR BLD AUTO: 4.6 %
LYMPHOCYTES NFR BLD AUTO: 6.3 %
MAGNESIUM SERPL-MCNC: 3.12 MG/DL (ref 1.6–2.4)
MCH RBC QN AUTO: 31.1 PG (ref 26–34)
MCH RBC QN AUTO: 31.2 PG (ref 26–34)
MCHC RBC AUTO-ENTMCNC: 28.8 G/DL (ref 32–36)
MCHC RBC AUTO-ENTMCNC: 30.2 G/DL (ref 32–36)
MCV RBC AUTO: 103 FL (ref 80–100)
MCV RBC AUTO: 108 FL (ref 80–100)
MONOCYTES # BLD AUTO: 0.35 X10*3/UL (ref 0.05–0.8)
MONOCYTES # BLD AUTO: 0.6 X10*3/UL (ref 0.05–0.8)
MONOCYTES NFR BLD AUTO: 2.7 %
MONOCYTES NFR BLD AUTO: 4.5 %
NEUTROPHILS # BLD AUTO: 11.39 X10*3/UL (ref 1.6–5.5)
NEUTROPHILS # BLD AUTO: 12 X10*3/UL (ref 1.6–5.5)
NEUTROPHILS NFR BLD AUTO: 89.6 %
NEUTROPHILS NFR BLD AUTO: 89.6 %
NRBC BLD-RTO: 0 /100 WBCS (ref 0–0)
NRBC BLD-RTO: 0 /100 WBCS (ref 0–0)
OXYHGB MFR BLDA: 97 % (ref 94–98)
OXYHGB MFR BLDA: 97 % (ref 94–98)
OXYHGB MFR BLDV: 58.4 % (ref 45–75)
PCO2 BLDA: 40 MM HG (ref 38–42)
PCO2 BLDA: 48 MM HG (ref 38–42)
PCO2 BLDV: 55 MM HG (ref 41–51)
PH BLDA: 7.3 PH (ref 7.38–7.42)
PH BLDA: 7.33 PH (ref 7.38–7.42)
PH BLDV: 7.31 PH (ref 7.33–7.43)
PHOSPHATE SERPL-MCNC: 2.3 MG/DL (ref 2.5–4.9)
PHOSPHATE SERPL-MCNC: 3.1 MG/DL (ref 2.5–4.9)
PHOSPHATE SERPL-MCNC: 3.4 MG/DL (ref 2.5–4.9)
PHOSPHATE SERPL-MCNC: 3.7 MG/DL (ref 2.5–4.9)
PLATELET # BLD AUTO: 107 X10*3/UL (ref 150–450)
PLATELET # BLD AUTO: 110 X10*3/UL (ref 150–450)
PO2 BLDA: 136 MM HG (ref 85–95)
PO2 BLDA: 141 MM HG (ref 85–95)
PO2 BLDV: 44 MM HG (ref 35–45)
POTASSIUM BLDA-SCNC: 3.6 MMOL/L (ref 3.5–5.3)
POTASSIUM BLDA-SCNC: 3.7 MMOL/L (ref 3.5–5.3)
POTASSIUM BLDV-SCNC: 4.2 MMOL/L (ref 3.5–5.3)
POTASSIUM SERPL-SCNC: 3.1 MMOL/L (ref 3.5–5.3)
POTASSIUM SERPL-SCNC: 3.9 MMOL/L (ref 3.5–5.3)
POTASSIUM SERPL-SCNC: 4 MMOL/L (ref 3.5–5.3)
POTASSIUM SERPL-SCNC: 4.1 MMOL/L (ref 3.5–5.3)
PROCALCITONIN SERPL-MCNC: 0.12 NG/ML
PROT SERPL-MCNC: 5.6 G/DL (ref 6.4–8.2)
PROT SERPL-MCNC: 6.1 G/DL (ref 6.4–8.2)
PROTHROMBIN TIME: 16.3 SECONDS (ref 9.8–12.8)
RBC # BLD AUTO: 3.63 X10*6/UL (ref 4–5.2)
RBC # BLD AUTO: 3.85 X10*6/UL (ref 4–5.2)
RH FACTOR (ANTIGEN D): NORMAL
SAO2 % BLDA: 100 % (ref 94–100)
SAO2 % BLDA: 100 % (ref 94–100)
SAO2 % BLDV: 60 % (ref 45–75)
SARS-COV-2 RNA RESP QL NAA+PROBE: NOT DETECTED
SODIUM BLDA-SCNC: 154 MMOL/L (ref 136–145)
SODIUM BLDA-SCNC: 161 MMOL/L (ref 136–145)
SODIUM BLDV-SCNC: 163 MMOL/L (ref 136–145)
SODIUM SERPL-SCNC: 158 MMOL/L (ref 136–145)
SODIUM SERPL-SCNC: 162 MMOL/L (ref 136–145)
SODIUM SERPL-SCNC: 164 MMOL/L (ref 136–145)
SODIUM SERPL-SCNC: 165 MMOL/L (ref 136–145)
SODIUM SERPL-SCNC: 166 MMOL/L (ref 136–145)
SODIUM SERPL-SCNC: 167 MMOL/L (ref 136–145)
SODIUM SERPL-SCNC: 167 MMOL/L (ref 136–145)
TSH SERPL-ACNC: 2.25 MIU/L (ref 0.44–3.98)
VANCOMYCIN SERPL-MCNC: 14.9 UG/ML (ref 5–20)
VANCOMYCIN TROUGH SERPL-MCNC: 8.7 UG/ML (ref 5–20)
WBC # BLD AUTO: 12.7 X10*3/UL (ref 4.4–11.3)
WBC # BLD AUTO: 13.4 X10*3/UL (ref 4.4–11.3)

## 2023-12-04 PROCEDURE — 93005 ELECTROCARDIOGRAM TRACING: CPT

## 2023-12-04 PROCEDURE — 85025 COMPLETE CBC W/AUTO DIFF WBC: CPT | Performed by: STUDENT IN AN ORGANIZED HEALTH CARE EDUCATION/TRAINING PROGRAM

## 2023-12-04 PROCEDURE — 82248 BILIRUBIN DIRECT: CPT | Performed by: STUDENT IN AN ORGANIZED HEALTH CARE EDUCATION/TRAINING PROGRAM

## 2023-12-04 PROCEDURE — 2500000004 HC RX 250 GENERAL PHARMACY W/ HCPCS (ALT 636 FOR OP/ED): Performed by: STUDENT IN AN ORGANIZED HEALTH CARE EDUCATION/TRAINING PROGRAM

## 2023-12-04 PROCEDURE — 82435 ASSAY OF BLOOD CHLORIDE: CPT | Performed by: INTERNAL MEDICINE

## 2023-12-04 PROCEDURE — 80048 BASIC METABOLIC PNL TOTAL CA: CPT | Mod: CCI

## 2023-12-04 PROCEDURE — 37799 UNLISTED PX VASCULAR SURGERY: CPT | Performed by: STUDENT IN AN ORGANIZED HEALTH CARE EDUCATION/TRAINING PROGRAM

## 2023-12-04 PROCEDURE — 84295 ASSAY OF SERUM SODIUM: CPT | Performed by: STUDENT IN AN ORGANIZED HEALTH CARE EDUCATION/TRAINING PROGRAM

## 2023-12-04 PROCEDURE — 03HY32Z INSERTION OF MONITORING DEVICE INTO UPPER ARTERY, PERCUTANEOUS APPROACH: ICD-10-PCS | Performed by: STUDENT IN AN ORGANIZED HEALTH CARE EDUCATION/TRAINING PROGRAM

## 2023-12-04 PROCEDURE — 85610 PROTHROMBIN TIME: CPT | Performed by: STUDENT IN AN ORGANIZED HEALTH CARE EDUCATION/TRAINING PROGRAM

## 2023-12-04 PROCEDURE — 2500000002 HC RX 250 W HCPCS SELF ADMINISTERED DRUGS (ALT 637 FOR MEDICARE OP, ALT 636 FOR OP/ED): Performed by: STUDENT IN AN ORGANIZED HEALTH CARE EDUCATION/TRAINING PROGRAM

## 2023-12-04 PROCEDURE — 2500000005 HC RX 250 GENERAL PHARMACY W/O HCPCS: Performed by: STUDENT IN AN ORGANIZED HEALTH CARE EDUCATION/TRAINING PROGRAM

## 2023-12-04 PROCEDURE — 4A133J1 MONITORING OF ARTERIAL PULSE, PERIPHERAL, PERCUTANEOUS APPROACH: ICD-10-PCS | Performed by: STUDENT IN AN ORGANIZED HEALTH CARE EDUCATION/TRAINING PROGRAM

## 2023-12-04 PROCEDURE — 99291 CRITICAL CARE FIRST HOUR: CPT

## 2023-12-04 PROCEDURE — 82435 ASSAY OF BLOOD CHLORIDE: CPT | Performed by: STUDENT IN AN ORGANIZED HEALTH CARE EDUCATION/TRAINING PROGRAM

## 2023-12-04 PROCEDURE — 82330 ASSAY OF CALCIUM: CPT

## 2023-12-04 PROCEDURE — 84132 ASSAY OF SERUM POTASSIUM: CPT

## 2023-12-04 PROCEDURE — 2500000004 HC RX 250 GENERAL PHARMACY W/ HCPCS (ALT 636 FOR OP/ED)

## 2023-12-04 PROCEDURE — 4A133B1 MONITORING OF ARTERIAL PRESSURE, PERIPHERAL, PERCUTANEOUS APPROACH: ICD-10-PCS | Performed by: STUDENT IN AN ORGANIZED HEALTH CARE EDUCATION/TRAINING PROGRAM

## 2023-12-04 PROCEDURE — 83036 HEMOGLOBIN GLYCOSYLATED A1C: CPT | Performed by: STUDENT IN AN ORGANIZED HEALTH CARE EDUCATION/TRAINING PROGRAM

## 2023-12-04 PROCEDURE — 2020000001 HC ICU ROOM DAILY

## 2023-12-04 PROCEDURE — P9045 ALBUMIN (HUMAN), 5%, 250 ML: HCPCS | Mod: JZ | Performed by: STUDENT IN AN ORGANIZED HEALTH CARE EDUCATION/TRAINING PROGRAM

## 2023-12-04 PROCEDURE — 71045 X-RAY EXAM CHEST 1 VIEW: CPT | Mod: FY

## 2023-12-04 PROCEDURE — 97166 OT EVAL MOD COMPLEX 45 MIN: CPT | Mod: GO

## 2023-12-04 PROCEDURE — 36620 INSERTION CATHETER ARTERY: CPT | Performed by: INTERNAL MEDICINE

## 2023-12-04 PROCEDURE — 80048 BASIC METABOLIC PNL TOTAL CA: CPT | Mod: CCI | Performed by: STUDENT IN AN ORGANIZED HEALTH CARE EDUCATION/TRAINING PROGRAM

## 2023-12-04 PROCEDURE — 84132 ASSAY OF SERUM POTASSIUM: CPT | Performed by: STUDENT IN AN ORGANIZED HEALTH CARE EDUCATION/TRAINING PROGRAM

## 2023-12-04 PROCEDURE — 82947 ASSAY GLUCOSE BLOOD QUANT: CPT

## 2023-12-04 PROCEDURE — 71045 X-RAY EXAM CHEST 1 VIEW: CPT | Performed by: RADIOLOGY

## 2023-12-04 PROCEDURE — 97162 PT EVAL MOD COMPLEX 30 MIN: CPT | Mod: GP

## 2023-12-04 PROCEDURE — 85018 HEMOGLOBIN: CPT | Performed by: STUDENT IN AN ORGANIZED HEALTH CARE EDUCATION/TRAINING PROGRAM

## 2023-12-04 PROCEDURE — 92610 EVALUATE SWALLOWING FUNCTION: CPT | Mod: GN | Performed by: SPEECH-LANGUAGE PATHOLOGIST

## 2023-12-04 PROCEDURE — 83735 ASSAY OF MAGNESIUM: CPT | Performed by: STUDENT IN AN ORGANIZED HEALTH CARE EDUCATION/TRAINING PROGRAM

## 2023-12-04 PROCEDURE — 51702 INSERT TEMP BLADDER CATH: CPT

## 2023-12-04 PROCEDURE — 84145 PROCALCITONIN (PCT): CPT | Performed by: STUDENT IN AN ORGANIZED HEALTH CARE EDUCATION/TRAINING PROGRAM

## 2023-12-04 PROCEDURE — 80053 COMPREHEN METABOLIC PANEL: CPT | Performed by: STUDENT IN AN ORGANIZED HEALTH CARE EDUCATION/TRAINING PROGRAM

## 2023-12-04 PROCEDURE — 80069 RENAL FUNCTION PANEL: CPT | Mod: CCI | Performed by: STUDENT IN AN ORGANIZED HEALTH CARE EDUCATION/TRAINING PROGRAM

## 2023-12-04 PROCEDURE — 84100 ASSAY OF PHOSPHORUS: CPT | Performed by: STUDENT IN AN ORGANIZED HEALTH CARE EDUCATION/TRAINING PROGRAM

## 2023-12-04 PROCEDURE — 80202 ASSAY OF VANCOMYCIN: CPT | Performed by: STUDENT IN AN ORGANIZED HEALTH CARE EDUCATION/TRAINING PROGRAM

## 2023-12-04 RX ORDER — ALBUMIN HUMAN 50 G/1000ML
25 SOLUTION INTRAVENOUS ONCE
Status: COMPLETED | OUTPATIENT
Start: 2023-12-04 | End: 2023-12-04

## 2023-12-04 RX ORDER — VANCOMYCIN HYDROCHLORIDE 750 MG/150ML
750 INJECTION, SOLUTION INTRAVENOUS ONCE
Status: COMPLETED | OUTPATIENT
Start: 2023-12-04 | End: 2023-12-04

## 2023-12-04 RX ORDER — VANCOMYCIN HYDROCHLORIDE 1 G/20ML
INJECTION, POWDER, LYOPHILIZED, FOR SOLUTION INTRAVENOUS DAILY PRN
Status: DISCONTINUED | OUTPATIENT
Start: 2023-12-04 | End: 2023-12-06

## 2023-12-04 RX ORDER — DOPAMINE HYDROCHLORIDE 160 MG/100ML
1-10 INJECTION, SOLUTION INTRAVENOUS CONTINUOUS
Status: DISCONTINUED | OUTPATIENT
Start: 2023-12-04 | End: 2023-12-06

## 2023-12-04 RX ORDER — DOPAMINE HYDROCHLORIDE 160 MG/100ML
INJECTION, SOLUTION INTRAVENOUS
Status: COMPLETED
Start: 2023-12-04 | End: 2023-12-04

## 2023-12-04 RX ORDER — DEXTROSE MONOHYDRATE 50 MG/ML
100 INJECTION, SOLUTION INTRAVENOUS CONTINUOUS
Status: DISCONTINUED | OUTPATIENT
Start: 2023-12-04 | End: 2023-12-04

## 2023-12-04 RX ORDER — INSULIN LISPRO 100 [IU]/ML
0-5 INJECTION, SOLUTION INTRAVENOUS; SUBCUTANEOUS EVERY 4 HOURS
Status: DISCONTINUED | OUTPATIENT
Start: 2023-12-04 | End: 2023-12-05

## 2023-12-04 RX ORDER — EPINEPHRINE HCL IN DEXTROSE 5% 4MG/250ML
0-2 PLASTIC BAG, INJECTION (ML) INTRAVENOUS CONTINUOUS
Status: DISCONTINUED | OUTPATIENT
Start: 2023-12-04 | End: 2023-12-04

## 2023-12-04 RX ORDER — DEXTROSE MONOHYDRATE 100 MG/ML
0.3 INJECTION, SOLUTION INTRAVENOUS ONCE AS NEEDED
Status: COMPLETED | OUTPATIENT
Start: 2023-12-04 | End: 2023-12-05

## 2023-12-04 RX ORDER — EPINEPHRINE 1 MG/ML
INJECTION INTRAMUSCULAR; INTRAVENOUS; SUBCUTANEOUS
Status: DISPENSED
Start: 2023-12-04 | End: 2023-12-05

## 2023-12-04 RX ORDER — DEXTROSE 50 % IN WATER (D50W) INTRAVENOUS SYRINGE
25
Status: DISCONTINUED | OUTPATIENT
Start: 2023-12-04 | End: 2023-12-06

## 2023-12-04 RX ORDER — POTASSIUM CHLORIDE 14.9 MG/ML
20 INJECTION INTRAVENOUS
Status: COMPLETED | OUTPATIENT
Start: 2023-12-04 | End: 2023-12-04

## 2023-12-04 RX ADMIN — Medication 6 L/MIN: at 09:00

## 2023-12-04 RX ADMIN — INSULIN LISPRO 1 UNITS: 100 INJECTION, SOLUTION INTRAVENOUS; SUBCUTANEOUS at 23:35

## 2023-12-04 RX ADMIN — VANCOMYCIN HYDROCHLORIDE 750 MG: 750 INJECTION, SOLUTION INTRAVENOUS at 16:15

## 2023-12-04 RX ADMIN — PIPERACILLIN SODIUM AND TAZOBACTAM SODIUM 2.25 G: 2; .25 INJECTION, SOLUTION INTRAVENOUS at 20:02

## 2023-12-04 RX ADMIN — INSULIN LISPRO 5 UNITS: 100 INJECTION, SOLUTION INTRAVENOUS; SUBCUTANEOUS at 19:33

## 2023-12-04 RX ADMIN — HYDROCORTISONE SODIUM SUCCINATE 50 MG: 100 INJECTION, POWDER, FOR SOLUTION INTRAMUSCULAR; INTRAVENOUS at 16:14

## 2023-12-04 RX ADMIN — DOPAMINE HYDROCHLORIDE 5 MCG/KG/MIN: 160 INJECTION, SOLUTION INTRAVENOUS at 15:09

## 2023-12-04 RX ADMIN — POTASSIUM CHLORIDE 20 MEQ: 14.9 INJECTION, SOLUTION INTRAVENOUS at 17:39

## 2023-12-04 RX ADMIN — SODIUM CHLORIDE 1000 ML: 9 INJECTION, SOLUTION INTRAVENOUS at 06:43

## 2023-12-04 RX ADMIN — EPINEPHRINE 0.3 MCG/KG/MIN: 1 INJECTION INTRAMUSCULAR; INTRAVENOUS; SUBCUTANEOUS at 17:39

## 2023-12-04 RX ADMIN — DEXTROSE MONOHYDRATE 50 ML/HR: 50 INJECTION, SOLUTION INTRAVENOUS at 09:51

## 2023-12-04 RX ADMIN — PIPERACILLIN SODIUM AND TAZOBACTAM SODIUM 2.25 G: 2; .25 INJECTION, SOLUTION INTRAVENOUS at 08:44

## 2023-12-04 RX ADMIN — PIPERACILLIN SODIUM AND TAZOBACTAM SODIUM 2.25 G: 2; .25 INJECTION, SOLUTION INTRAVENOUS at 14:34

## 2023-12-04 RX ADMIN — SODIUM CHLORIDE 1000 ML: 9 INJECTION, SOLUTION INTRAVENOUS at 00:06

## 2023-12-04 RX ADMIN — HYDROCORTISONE SODIUM SUCCINATE 50 MG: 100 INJECTION, POWDER, FOR SOLUTION INTRAMUSCULAR; INTRAVENOUS at 23:35

## 2023-12-04 RX ADMIN — ALBUMIN HUMAN 25 G: 0.05 INJECTION, SOLUTION INTRAVENOUS at 04:16

## 2023-12-04 RX ADMIN — DOPAMINE HYDROCHLORIDE 2.5 MCG/KG/MIN: 160 INJECTION, SOLUTION INTRAVENOUS at 04:14

## 2023-12-04 RX ADMIN — NOREPINEPHRINE BITARTRATE 0.14 MCG/KG/MIN: 8 INJECTION, SOLUTION INTRAVENOUS at 15:11

## 2023-12-04 RX ADMIN — HYDROCORTISONE SODIUM SUCCINATE 50 MG: 100 INJECTION, POWDER, FOR SOLUTION INTRAMUSCULAR; INTRAVENOUS at 05:34

## 2023-12-04 RX ADMIN — VASOPRESSIN 0.03 UNITS/MIN: 0.2 INJECTION INTRAVENOUS at 22:06

## 2023-12-04 RX ADMIN — HYDROCORTISONE SODIUM SUCCINATE 50 MG: 100 INJECTION, POWDER, FOR SOLUTION INTRAMUSCULAR; INTRAVENOUS at 10:49

## 2023-12-04 RX ADMIN — SODIUM CHLORIDE 500 ML: 9 INJECTION, SOLUTION INTRAVENOUS at 21:39

## 2023-12-04 RX ADMIN — POTASSIUM CHLORIDE 20 MEQ: 14.9 INJECTION, SOLUTION INTRAVENOUS at 16:15

## 2023-12-04 RX ADMIN — PIPERACILLIN SODIUM AND TAZOBACTAM SODIUM 2.25 G: 2; .25 INJECTION, SOLUTION INTRAVENOUS at 03:15

## 2023-12-04 RX ADMIN — SODIUM CHLORIDE 1000 ML: 9 INJECTION, SOLUTION INTRAVENOUS at 03:15

## 2023-12-04 RX ADMIN — EPINEPHRINE 0.1 MCG/KG/MIN: 1 INJECTION INTRAMUSCULAR; INTRAVENOUS; SUBCUTANEOUS at 13:00

## 2023-12-04 SDOH — ECONOMIC STABILITY: INCOME INSECURITY: IN THE PAST 12 MONTHS, HAS THE ELECTRIC, GAS, OIL, OR WATER COMPANY THREATENED TO SHUT OFF SERVICE IN YOUR HOME?: NO

## 2023-12-04 SDOH — ECONOMIC STABILITY: INCOME INSECURITY: IN THE LAST 12 MONTHS, WAS THERE A TIME WHEN YOU WERE NOT ABLE TO PAY THE MORTGAGE OR RENT ON TIME?: NO

## 2023-12-04 SDOH — ECONOMIC STABILITY: FOOD INSECURITY: WITHIN THE PAST 12 MONTHS, YOU WORRIED THAT YOUR FOOD WOULD RUN OUT BEFORE YOU GOT MONEY TO BUY MORE.: NEVER TRUE

## 2023-12-04 SDOH — ECONOMIC STABILITY: FOOD INSECURITY: WITHIN THE PAST 12 MONTHS, THE FOOD YOU BOUGHT JUST DIDN'T LAST AND YOU DIDN'T HAVE MONEY TO GET MORE.: NEVER TRUE

## 2023-12-04 SDOH — ECONOMIC STABILITY: TRANSPORTATION INSECURITY
IN THE PAST 12 MONTHS, HAS LACK OF TRANSPORTATION KEPT YOU FROM MEETINGS, WORK, OR FROM GETTING THINGS NEEDED FOR DAILY LIVING?: NO

## 2023-12-04 SDOH — SOCIAL STABILITY: SOCIAL INSECURITY: WITHIN THE LAST YEAR, HAVE YOU BEEN AFRAID OF YOUR PARTNER OR EX-PARTNER?: NO

## 2023-12-04 SDOH — SOCIAL STABILITY: SOCIAL INSECURITY
WITHIN THE LAST YEAR, HAVE YOU BEEN KICKED, HIT, SLAPPED, OR OTHERWISE PHYSICALLY HURT BY YOUR PARTNER OR EX-PARTNER?: NO

## 2023-12-04 SDOH — SOCIAL STABILITY: SOCIAL INSECURITY: WITHIN THE LAST YEAR, HAVE YOU BEEN HUMILIATED OR EMOTIONALLY ABUSED IN OTHER WAYS BY YOUR PARTNER OR EX-PARTNER?: NO

## 2023-12-04 SDOH — SOCIAL STABILITY: SOCIAL INSECURITY
WITHIN THE LAST YEAR, HAVE TO BEEN RAPED OR FORCED TO HAVE ANY KIND OF SEXUAL ACTIVITY BY YOUR PARTNER OR EX-PARTNER?: NO

## 2023-12-04 SDOH — ECONOMIC STABILITY: TRANSPORTATION INSECURITY
IN THE PAST 12 MONTHS, HAS THE LACK OF TRANSPORTATION KEPT YOU FROM MEDICAL APPOINTMENTS OR FROM GETTING MEDICATIONS?: NO

## 2023-12-04 SDOH — ECONOMIC STABILITY: HOUSING INSECURITY: IN THE LAST 12 MONTHS, HOW MANY PLACES HAVE YOU LIVED?: 1

## 2023-12-04 SDOH — ECONOMIC STABILITY: INCOME INSECURITY: HOW HARD IS IT FOR YOU TO PAY FOR THE VERY BASICS LIKE FOOD, HOUSING, MEDICAL CARE, AND HEATING?: NOT HARD AT ALL

## 2023-12-04 SDOH — ECONOMIC STABILITY: HOUSING INSECURITY
IN THE LAST 12 MONTHS, WAS THERE A TIME WHEN YOU DID NOT HAVE A STEADY PLACE TO SLEEP OR SLEPT IN A SHELTER (INCLUDING NOW)?: NO

## 2023-12-04 ASSESSMENT — COGNITIVE AND FUNCTIONAL STATUS - GENERAL
DRESSING REGULAR UPPER BODY CLOTHING: A LOT
MOVING FROM LYING ON BACK TO SITTING ON SIDE OF FLAT BED WITH BEDRAILS: TOTAL
CLIMB 3 TO 5 STEPS WITH RAILING: TOTAL
MOBILITY SCORE: 6
WALKING IN HOSPITAL ROOM: TOTAL
DAILY ACTIVITIY SCORE: 10
STANDING UP FROM CHAIR USING ARMS: TOTAL
PERSONAL GROOMING: A LOT
TURNING FROM BACK TO SIDE WHILE IN FLAT BAD: TOTAL
DRESSING REGULAR LOWER BODY CLOTHING: TOTAL
EATING MEALS: A LOT
TOILETING: TOTAL
MOVING TO AND FROM BED TO CHAIR: TOTAL
HELP NEEDED FOR BATHING: A LOT

## 2023-12-04 ASSESSMENT — PAIN - FUNCTIONAL ASSESSMENT
PAIN_FUNCTIONAL_ASSESSMENT: 0-10
PAIN_FUNCTIONAL_ASSESSMENT: CPOT (CRITICAL CARE PAIN OBSERVATION TOOL)
PAIN_FUNCTIONAL_ASSESSMENT: 0-10
PAIN_FUNCTIONAL_ASSESSMENT: CPOT (CRITICAL CARE PAIN OBSERVATION TOOL)
PAIN_FUNCTIONAL_ASSESSMENT: CPOT (CRITICAL CARE PAIN OBSERVATION TOOL)
PAIN_FUNCTIONAL_ASSESSMENT: 0-10
PAIN_FUNCTIONAL_ASSESSMENT: CPOT (CRITICAL CARE PAIN OBSERVATION TOOL)
PAIN_FUNCTIONAL_ASSESSMENT: 0-10
PAIN_FUNCTIONAL_ASSESSMENT: 0-10

## 2023-12-04 ASSESSMENT — PAIN SCALES - GENERAL
PAINLEVEL_OUTOF10: 0 - NO PAIN

## 2023-12-04 ASSESSMENT — ACTIVITIES OF DAILY LIVING (ADL)
BATHING_ASSISTANCE: TOTAL
LACK_OF_TRANSPORTATION: NO

## 2023-12-04 NOTE — CONSULTS
"Nutrition Assessment    Nutrition Initial Assessment:        Patient is a 83 y.o. female presenting from an OSH with septic shock, likely from PNA vs UTI.  She is on levo and dopamine for pressure support.  On D5W@ 50mls/hr in setting of hypernatremia.      This service consulted for assessment and recommendations.      Past medical history includes Alzheimer's disease (nonverbal at baseline), Crohn's disease, and seizures     Nutrition History:  Food and Nutrient History: Pt unable to provide nutrition or weight history at visit.    Anthropometrics:  Height: 160 cm (5' 3\")  Weight: 45.9 kg (101 lb 3.1 oz)  BMI (Calculated): 17.93  IBW/kg (Dietitian Calculated): 52.3 kg  Percent of IBW: 88 %     Objective/Subjective Weight History:     Weight Change %:  Weight History / % Weight Change: Pt with a 17% weight loss in the last 1.5 years           Wt Readings from Last 8 Encounters:   12/03/23 45.9 kg (101 lb 3.1 oz)   12/03/23 51.3 kg (113 lb)   10/10/23 50.1 kg (110 lb 7.2 oz)   07/01/22 55.5 kg (122 lb 6 oz)       Nutrition Focused Physical Exam Findings:    Subcutaneous Fat Loss:   Orbital Fat Pads: Severe (dark circles, hollowing and loose skin)   Buccal Fat Pads: Severe (hollow, sunken and narrow face)   Triceps: Severe (negligible fat tissue)       Muscle Wasting:  Temporalis: Severe (hollowed scooping depression)  Pectoralis (Clavicular Region): Severe (protruding prominent clavicle)  Deltoid/Trapezius: Severe (squared shoulders, acromion process prominent)        Quadriceps: Severe (depressions on inner and outer thigh)  Gastrocnemius: Severe (minimal muscle definition)  Edema:      Edema Location: non-pitting   Physical Findings:                   Objective Data:    Last BM Date: 12/03/23    Nutrition Significant Labs:  BMP Trend:   Results from last 7 days   Lab Units 12/04/23  0654 12/04/23  0310 12/03/23  2325 12/03/23  2030   GLUCOSE mg/dL 169* 140* 182* 152*  152*   CALCIUM mg/dL 8.4* 8.8 9.1 9.2  9.2 " "  SODIUM mmol/L 164* 166* 165* 167*  167*   POTASSIUM mmol/L 4.1 4.0 4.0 4.0  4.0   CO2 mmol/L 18* 29 28 29  29   CHLORIDE mmol/L 130* 131* 129* 130*  130*   BUN mg/dL 90* 104* 112* 115*  115*   CREATININE mg/dL 1.65* 1.80* 2.02* 2.05*  2.05*    , A1C:No results found for: \"HGBA1C\", BG POCT trend:    , Renal Lab Trend:   Results from last 7 days   Lab Units 12/04/23  0654 12/04/23  0310 12/03/23  2325 12/03/23 2030   POTASSIUM mmol/L 4.1 4.0 4.0 4.0  4.0   PHOSPHORUS mg/dL  --  3.1 3.4 3.7   SODIUM mmol/L 164* 166* 165* 167*  167*   MAGNESIUM mg/dL  --  3.12*  --   --    EGFR mL/min/1.73m*2 30* 27* 24* 23*  23*   BUN mg/dL 90* 104* 112* 115*  115*   CREATININE mg/dL 1.65* 1.80* 2.02* 2.05*  2.05*    , Vit D: No results found for: \"VITD25\"     Nutrition Specific Mediations:  Scheduled medications  hydrocortisone sodium succinate, 50 mg, intravenous, q6h  piperacillin-tazobactam, 2.25 g, intravenous, q6h      Continuous medications  dextrose 5 % in water (D5W), 50 mL/hr, Last Rate: 50 mL/hr (12/04/23 0951)  DOPamine, 1-5 mcg/kg/min (Dosing Weight), Last Rate: 5 mcg/kg/min (12/04/23 0700)  norepinephrine, 0-3.3 mcg/kg/min, Last Rate: 0.13 mcg/kg/min (12/04/23 1042)        Dietary Orders (From admission, onward)       Start     Ordered    12/03/23 2245  NPO Diet; Effective now  Diet effective now         12/03/23 2245                     Estimated Needs:   Total Energy Estimated Needs (kCal):  (6242-0813)   Method for Estimating Needs: MSJ= 879    Total Protein Estimated Needs (g):  (60)   Method for Estimating Needs: 45.9kg x 1.3          Nutrition Diagnosis   Nutrition Diagnosis:  Malnutrition Diagnosis  Patient has Malnutrition Diagnosis: Yes  Diagnosis Status: New  Malnutrition Diagnosis: Severe malnutrition related to chronic disease or condition  As Evidenced by: pt with a 17% weight loss in about a year and a half along with severe muscle and fat loss on physical exam; suspect PO intake was inadequate " PTA          Nutrition Interventions/Recommendations   Nutrition Interventions and Recommendations:        Nutrition Prescription:   Would discourage placmenet of feeding tube in pt with Alzheimer's who is non-verbal at baseline.    If this becomes the plan, reconsult this service for recommendations.  Check Vitamin D level          Time Spent/Follow-up Reminder:   Follow Up  Time Spent (min): 45 minutes  Last Date of Nutrition Visit: 12/04/23  Nutrition Follow-Up Needed?: Dietitian to reassess per policy  Follow up Comment: NPO, Alzheimer's, unclear nutrition plan

## 2023-12-04 NOTE — H&P
MEDICAL INTENSIVE CARE UNIT  ADMISSION H&P    Subjective   HPI:  Nay Frost is a 83 y.o. female with Alzheimer's disease (nonverbal at baseline), Crohn's disease, and seizures who presents as a transfer from [facility] with presumed septic shock from infection of left thumb.    Unable to obtain history from patient and mental status.  History obtained from daughter and chart review.  Patient was brought to [facility] [place] emergency department earlier today (12/3) by a nurse from her facility who noticed a large wound on her left thumb.  The patient lives in a memory unit of a SNF ([facility] in [facility]), and has reportedly been chewing on the thumb.  She has been more altered than usual lately.  She has been in this facility for many years, but has gotten worse over the last year.  Daughter thinks there were changes in the staffing that may have led to her mother receiving less care.  The daughter is currently working out of [place] still has not seen the patient for a while.      ED Course:  Vitals on arrival to [facility] [place] ED:   T 35.6C, HR 97, RR 20, BP 94/54, 97% on RA  Per ED report, patient was in sinus christine throughout [facility] ED stay    Relevant studies:  Results from last 7 days   Lab Units 12/03/23  1441   WBC AUTO x10*3/uL 12.4*   HEMOGLOBIN g/dL 13.6   HEMATOCRIT % 48.0*   PLATELETS AUTO x10*3/uL 124*       12/03/23 14:41 12/03/23 15:52 12/03/23 16:16   GLUCOSE   217 (H)    SODIUM   169 (HH) Baseline: 146-150   POTASSIUM   4.1    CHLORIDE   126 (H)    Bicarbonate   29    Anion Gap   18    Blood Urea Nitrogen   104 (HH)    Creatinine   2.05 (H)    EGFR   23 (L)    Calcium   8.2 (L)    Albumin   2.7 (L)    Alkaline Phosphatase   88    ALT   20    AST   24    Bilirubin Total   0.8    Total Protein   5.3 (L)    MAGNESIUM   2.36    Lactate  2.2 (H)  1.5   (HH): Data is critically high  (H): Data is abnormally high  (L): Data is abnormally low    Results from last 7 days   Lab Units 12/03/23  1552   ALT U/L  20   AST U/L 24   ALK PHOS U/L 88      Results from last 7 days   Lab Units 12/03/23  1552   INR  1.6*             Results from last 7 days   Lab Units 12/03/23  1616 12/03/23  1441   LACTATE mmol/L 1.5 2.2*       12/03/23 18:01   Color, Urine  Amisha !   Appearance, Urine  Hazy !   Specific Gravity, Urine  1.018   pH, Urine  5.0   Protein, Urine  30 (1+) !   Glucose, Urine  NEGATIVE   Blood, Urine  NEGATIVE   Ketones, Urine  NEGATIVE   Bilirubin, Urine  NEGATIVE   Urobilinogen, Urine  <2.0   Nitrite, Urine  NEGATIVE   Leukocyte Esterase, Urine  LARGE (3+) !   Hyaline Casts, Urine  3+ !   Mucus, Urine  2+   WBC Clumps, Urine  MANY   Squamous Epithelial Cells, Urine  1-9 (SPARSE)   Bacteria, Urine  4+ !   RBC, Urine  >20 !   WBC, Urine  >50 !       CT c/a/p wo contrast  IMPRESSION:  Nodular consolidation ground-glass opacities in the right upper and  lower lobes, suspicious for pneumonia. Follow-up chest CT in 3 months  is recommended to assess for resolution.      Trace right pleural effusion.      No acute abdominal or pelvic abnormality.      Age indeterminate multiple thoracic and L5 compression fractures.      2.8 cm left ovarian cyst. Follow-up nonemergent pelvic ultrasound is  recommended for characterization.    XR hand L  IMPRESSION:  1. No fracture or dislocation.  2. Degenerative changes, as described above.      Interventions:  She initially presented to [facility] ED from [facility], and was to be admitted to the ICU out there. However, was transferred to [facility] for consultation with ortho hand.   NS bolus 3L -> no UOP in the ED  Vanc/zosyn  Levophed 0.09  Ortho hand consulted: debrided L thumb and don't believe it to be the source of her septic shock. They have no plan to OR after bedside debriding , wound care consult  Mitts for biting fingers      Medical History:  PMH: above    Medications: reviewed SNF med rec    Allergies: reviewed in EMR  PSH: recent hip surgery  FamHx: reviewed, noncontributory    SocHx:  Home:  lives in memory care unit at [facility] in [facility]  ADLs: not independent  Education/work: retired  Substance use: unable to assess    ROS: unable to obtain due to mental status of patient          Objective   Vitals: reviewed  Exam:  Gen: ill-appearing, thin, NAD, looks to voice and responds to questions with one word answers that don't always make sense  Head and neck: NCAT, neck supple without LAD  HEENT: MM dry, normal nose without congestion  CV: bradycardia, normal rhythm, no mrg  Pulm: CTAB, no wheezing or crackles  Abd: soft, non-tender, non-distended  Ext: WWP, no LE edema  Neuro: normal tone, face symmetric, moves all extremities spontaneously but not purposefully         Assessment/Plan   83 y.o. female with Alzheimer's disease (nonverbal at baseline), Crohn's disease, and seizures who presents as a transfer from [facility] with presumed septic shock from infection of left thumb on levo. Ortho hand debrided the thumb, and it is not likely the source. She was found to have pneumonia on CT and positive UA which are the more likely sources.  Course has been complicated by sinus bradycardia, hypernatremia to 169 and HI (2.1, bl 1.0).  She remains in the ICU for treatment of septic shock on pressors.     CNS  #AMS on Alzheimer's dementia  -most likely d/t septic shock  -minimally verbal at baseline  -holding home donepezil and trazodone while NPO    PULM  No active issues    CV  #Septic shock  -levo 0.09 -> transition to dopamine for bradycardia  -will trial more fluid pending sodium   -abx as below    #Sinus bradycardia  #First degree AV block  -possibly paradoxical response to sepsis vs SSS  -add on TFTs  -start dopamine gtt  [ ] cardiology consult     ID  #Septic shock  -source(s): soft tissue infection  -UCx and BCx pending  -continue vanc/zosyn  -MRSA nares pending    FEN/GI  NPO for AMS and critical illness    RENAL/  #HI   -Cr 2.1, bl 1.0  -most likely d/t septic shock    #Hypernatremia,  acute on chronic  -baseline 145-150, admission 169  -s/p 3L NS at OSH ED  -RFP q4h   -8pm Na 167 -> 1L NS bolus  -midnight Na 165 -> 1L NS bolus  -has had total of 5L NS bolus  -giving albumin bolus now    HEME/ONC  #Chronic anemia   #Chronic thrombocytopenia  -bl hb 8-10, so suspect some component of hemoconcentration  -bl platelets   -maintain active type and screen    FEN: NPO for AMS/shock  A: R fem triple lumen CVC, PIV  O2: RA  Drips: levo 0.09  Abx: vanc/zosyn  DVT PPx: SQH    CODE STATUS: DNAR/DNI, no dialysis, ok for one attempt at defib if reversible (confirmed with daughter on admission)  SURROGATE DECISION MAKER: Daughter Zeenat [phone] (cell)  Second call: Hector Walsh (son-in-law) [phone], home [phone], office [phone]    José Miguel Reeder MD  Resident, PGY-3      =================  Brief Attending Summary:   Septic shock / hypotension  Left thumb soft tissue infection - s/p debridement  HI  Hypernatremia  Bradycardia    I have reviewed and evaluated the most recent data and results, personally examined the patient, and formulated the plan of care as presented above. This patient was critically ill and required continued critical care treatment. Teaching and any separately billable procedures are not included in the time calculation.    Billing Provider Critical Care Time: 52 minutes on 12/3/23    Kai Kathleen MD

## 2023-12-04 NOTE — PROGRESS NOTES
"Vancomycin Dosing by Pharmacy- FOLLOW UP    Nay Frost is a 83 y.o. year old female who Pharmacy has been consulted for vancomycin dosing for pneumonia. Based on the patient's indication and renal status this patient is being dosed based on a goal trough/random level of 15-20.     Renal function is currently improving. Baseline Scr ~ 1.0    Current vancomycin dose: Dosing by levels due to decline in renal function.    Most recent vancomycin level: 8.7    Visit Vitals  /72   Pulse (!) 37   Temp 35.3 °C (95.5 °F)   Resp 13        Lab Results   Component Value Date    CREATININE 1.49 (H) 12/04/2023    CREATININE 1.65 (H) 12/04/2023    CREATININE 1.80 (H) 12/04/2023    CREATININE 2.02 (H) 12/03/2023        Patient weight is No results found for: \"PTWEIGHT\"    No results found for: \"CULTURE\"     I/O last 3 completed shifts:  In: 3706.7 (80.8 mL/kg) [I.V.:106.7 (2.3 mL/kg); Blood:500; IV Piggyback:3100]  Out: 475 (10.3 mL/kg) [Urine:475 (0.3 mL/kg/hr)]  Dosing Weight: 45.9 kg   [unfilled]    Lab Results   Component Value Date    PATIENTTEMP 37.0 12/04/2023    PATIENTTEMP 37.0 12/04/2023        Assessment/Plan    Renal function continues to improve, but will continue to dose by levels until Scr reaches near baseline of ~ 1.0. A one-time dose of 750mg will be given today.  The next level will be obtained on 12/5 at 1600. May be obtained sooner if clinically indicated.   Will continue to monitor renal function daily while on vancomycin and order serum creatinine at least every 48 hours if not already ordered.  Follow for continued vancomycin needs, clinical response, and signs/symptoms of toxicity.       Wolf Cook, PharmD           "

## 2023-12-04 NOTE — SIGNIFICANT EVENT
Restraints indicated to maintain lines/tubes.  Alternative therapies have been attempted and have been ineffective.  Restrain with soft wrist restraints  to Right and Left hand mitt side rails up x4 until medical devices discontinued and/or patient able to participate with plan of care.

## 2023-12-04 NOTE — HOSPITAL COURSE
Nay Falcon is an 84 yo F with a PMHx of Alzheimer's disease (minimally verbal at baseline), Crohn's disease, HTN, HLD, insomnia, and prior seizures who initially presented 12/3 from home nursing care facility due to concern for left thumb infection. Found to be acutely hypernatremic (169) and in septic shock with evidence of right-sided pneumonia and UTI.     Underwent debridement of left thumb with ortho in the ED, with overall low concern for skin/soft tissue infection as source of sepsis.     Admitted to the mICU, started on Levophed and broad-spectrum antibiotics.  Received volume resuscitation with isotonic solutions and was started on D5W-1/2 saline infusion for hypernatremia.    ICU course c/b new sinus bradycardia with 1st degree AV block requiring dopamine drip.

## 2023-12-04 NOTE — PROCEDURES
Attempted right radial arterial line but guidewire did not thread through and therefore procedure was aborted.

## 2023-12-04 NOTE — PROGRESS NOTES
Physical Therapy    Physical Therapy Evaluation    Patient Name: Nay Frost  MRN: [id number]  Today's Date: 12/4/2023   Time Calculation  Start Time: 1027  Stop Time: 1049  Time Calculation (min): 22 min    Assessment/Plan   PT Assessment  PT Assessment Results: Decreased strength, Decreased range of motion, Impaired balance, Decreased mobility, Decreased cognition, Orthopedic restrictions  Rehab Prognosis: Fair  End of Session Communication: Bedside nurse  End of Session Patient Position: Bed, 3 rail up, Alarm off, not on at start of session (RUE wrist restraint secured; L mitt in place)  IP OR SWING BED PT PLAN  Inpatient or Swing Bed: Inpatient  PT Plan  Treatment/Interventions: Bed mobility, Transfer training, Gait training, Balance training, Strengthening, Therapeutic activity, Therapeutic exercise, Postural re-education  PT Plan: Skilled PT  PT Frequency: 3 times per week  PT Discharge Recommendations: Moderate intensity level of continued care  PT - OK to Discharge: Yes      Subjective   General Visit Information:  General  Reason for Referral: sepsis, PNA, UTI; patient sucking and biting on L thumb at nursing home (memory care at [facility]), causing necrotic tissue, ortho completed a debridement, no further surgical intervention per ortho note, x-ray L hand (-) fx;  Past Medical History Relevant to Rehab: Alzheimer's disease (minimally verbal at baseline), Crohn's disease, and seizures; hx R femoral neck fx s/p R hip hemiarthroplasty 10/11/23 (WBAT RLE, R posterior hip precautions).  Family/Caregiver Present: No  Co-Treatment: OT  Co-Treatment Reason: hx advanced Alzheimer's, minimally verbal at baseline; recent R GASTON (10/11/23), from memory care unit.  Prior to Session Communication: Bedside nurse  Patient Position Received: Bed, 3 rail up, Alarm off, not on at start of session (L mitt secure, RUE wrist restraint)  General Comment: minimal verbal response, only spoke 1-2 words during session,  illogical. 5 mg dopamine (PT called facility 2x, unable to speak to someone in Memory Care facility)  Home Living:  Home Living  Type of Home:  (per chart review, patient from [facility])  Prior Level of Function:  Prior Function Per Pt/Caregiver Report  Level of [place]: Unable to asses at this time (from PT eval in 10/23: per SW at [facility]-> maximal assist for bed mobility, x1 assist for transfers, total A for bathing/toileting, requires set-up for feeding, indep ambulation without device (?))  Receives Help From:  (staff at facility)  Precautions:  Precautions  Hearing/Visual Limitations: difficult to formally assess d/t impaired cognition, verbal response  LE Weight Bearing Status: Weight Bearing as Tolerated (RLE)  Medical Precautions: Fall precautions, Oxygen therapy device and L/min  Post-Surgical Precautions: Right hip precautions  Vital Signs:  Vital Signs  Heart Rate:  (pre: 59 post: 45)  Resp:  (pre: 15 post: 11)  SpO2:  (pre: 100% post: 100% on 2.5 L NC)  BP:  (pre: 104/51 (MAP 73), during session, MAP dropped to high 50s, post: 104/49 (MAP 70); on .1 levo, RN increased to .13 levo)  BP Location: Right arm  BP Method: Arterial line    Objective   Pain:  Pain Assessment  Pain Assessment: 0-10  Pain Score: 0 - No pain  Cognition:  Cognition  Overall Cognitive Status: Impaired  Orientation Level:  (AxO x0)    General Assessments:        Postural Control  Postural Control: Impaired    Static Sitting Balance  Static Sitting-Level of Assistance:  (initially MAXx1, once positioned so patient was perpendicular to the EOB, patient able to sit with brief CGx1, mostly MINx1.)       Functional Assessments:  Bed Mobility  Bed Mobility: Yes  Bed Mobility 1  Bed Mobility 1: Supine to sitting, Sitting to supine  Level of Assistance 1: Dependent, Maximum verbal cues, Maximum tactile cues  Bed Mobility Comments 1: x2 assist, with HOB elevated, draw sheet; patient did not attempt to initiate  task.     Extremity/Trunk Assessments:  RUE   RUE :  (grossly WFL PROM)  LUE   LUE:  (grossly WFL PROM except L wrist/digit ROM not assessed d/t mitt in place.)  RLE   RLE :  (grossly limited PROM (met with resistance), also limited d/t R posterior hip precautions)  LLE   LLE :  (grossly limited PROM (met with resistance))  Outcome Measures:  [facility] Basic Mobility  Turning from your back to your side while in a flat bed without using bedrails: Total  Moving from lying on your back to sitting on the side of a flat bed without using bedrails: Total  Moving to and from bed to chair (including a wheelchair): Total  Standing up from a chair using your arms (e.g. wheelchair or bedside chair): Total  To walk in hospital room: Total  Climbing 3-5 steps with railing: Total  Basic Mobility - Total Score: 6    FSS-ICU  Ambulation: Unable to attempt due to weakness  Rolling: Total assistance (performs 25% or requires another person)  Sitting: Minimal assistance (performs 75% or more of task)  Transfer Sit-to-Stand: Unable to perform  Transfer Supine-to-Sit: Unable to perform  Total Score: 5    E = Exercise and Early Mobility  Early Mobility/Exercise Safety Screen: Proceed with mobilization - No exclusion criteria met  Current Activity: Sitting at edge of bed    Encounter Problems       Encounter Problems (Active)       PT Problem       Patient will complete bed mobility with MODx2        Start:  12/04/23    Expected End:  12/25/23            Patient will complete STS with MODx2 using LRAD without acute LOB         Start:  12/04/23    Expected End:  12/25/23            Patient will ambulate >/=20' with LRAD with MINx1 without acute LOB        Start:  12/04/23    Expected End:  12/25/23            Patient will complete static (minimal assist x1) and dynamic (moderate assist x1) standing balance activities using LRAD without acute LOB.        Start:  12/04/23    Expected End:  12/25/23            Patient will participate in BLE  there-ex program in order to assist in improving strength and to assist with the completion of functional mobility tasks.        Start:  12/04/23    Expected End:  12/25/23                            Education Documentation  Mobility Training, taught by Juliet Mcnamara PT at 12/4/2023  1:51 PM.  Learner: Patient  Readiness: Nonacceptance  Method: Explanation  Response: No Evidence of Learning    Education Comments  No comments found.        Juliet Mcnamara PT, DPT

## 2023-12-04 NOTE — CARE PLAN
The patient's goals for the shift include      The clinical goals for the shift include stabilize pt's blood pressure    Problem: Pain - Adult  Goal: Verbalizes/displays adequate comfort level or baseline comfort level  Outcome: Progressing     Problem: Safety - Adult  Goal: Free from fall injury  Outcome: Progressing     Problem: Discharge Planning  Goal: Discharge to home or other facility with appropriate resources  Outcome: Progressing     Problem: Chronic Conditions and Co-morbidities  Goal: Patient's chronic conditions and co-morbidity symptoms are monitored and maintained or improved  Outcome: Progressing     Problem: Safety - Medical Restraint  Goal: Remains free of injury from restraints (Restraint for Interference with Medical Device)  Outcome: Progressing  Goal: Free from restraint(s) (Restraint for Interference with Medical Device)  Outcome: Progressing     Problem: Skin  Goal: Decreased wound size/increased tissue granulation at next dressing change  Outcome: Progressing  Goal: Participates in plan/prevention/treatment measures  Outcome: Progressing  Goal: Prevent/manage excess moisture  Outcome: Progressing  Goal: Prevent/minimize sheer/friction injuries  Outcome: Progressing  Goal: Promote/optimize nutrition  Outcome: Progressing  Goal: Promote skin healing  Outcome: Progressing

## 2023-12-04 NOTE — CONSULTS
Reason For Consult  Left thumb necrotic tissue in setting of sepsis     History Of Present Illness  Nay Frost is a 83 y.o. female presenting with dementia and recent left hip hemiarthroplasty for femoral neck fracture is presenting as transfer from outside hospital with concerns of sepsis.  Patient is a resident of a nursing home and has been known to be sucking and biting on her left thumb.  Hand surgery was consulted due to necrotic tissue overlying the dorsal aspect of the left thumb.  Additional workup of sepsis did reveal that the patient has pneumonia as well as UTI.     Past Medical History  She has a past medical history of Alzheimer's disease (CMS/[facility]), Amnesia, COVID-19, Crohn's disease (CMS/[facility]), Dementia (CMS/[facility]), Hip fracture, right (CMS/[facility]) (10/09/2023), Hyperlipidemia, Hypertension, Insomnia, Migraine, Osteoporosis, and Seizures (CMS/[facility]).    Surgical History  She has no past surgical history on file.     Social History  She reports that she has never smoked. She has never been exposed to tobacco smoke. She has never used smokeless tobacco. She reports that she does not currently use alcohol. She reports that she does not currently use drugs.    Family History  No family history on file.     Allergies  Bee pollen, Bee venom protein (honey bee), Erythromycin, [place] flavor, Iodine, Nut - unspecified, Pneumococcal vaccine, Shellfish derived, Strawberry, Sulfa (sulfonamide antibiotics), and Tree nuts    Review of Systems  Unable to obtain review of systems due to patient's mental status     Physical Exam  General: awake,  A&Ox0  Skin: left thumb necrosis on dorsal aspect from base of thumb to nailbed   Head: atraumatic, normocephalic  Resp: good chest rise with symmetric thoracic expansion, breathing comfortably on room air  CV: extremities warm and well perfused, brisk capillary refill  GI: abdomen soft, non-tender, non-distended  Neuro: History of dementia  Psych: mood and affect  "appropriate  MSK: Left upper extremity     Blistering and necrotic tissue on the dorsal aspect of the left thumb extending from the base of the thumb to the nailbed.  Unable to obtain sensory exam due to the patient's underlying dementia  Patient is moving all extremities spontaneously, wiggles all of the fingers to her left hand  Radial pulse 2+ and equal to contralateral side  Left thumb appears warm and well-perfused with good capillary refill to the tip of the thumb       Last Recorded Vitals  Blood pressure 118/83, pulse 51, temperature 36.9 °C (98.4 °F), temperature source Tympanic, resp. rate 20, height 1.6 m (5' 3\"), weight 51.3 kg (113 lb), SpO2 98 %.    Relevant Results    XR hand left 3+ views    Result Date: 12/3/2023  Interpreted By:  Louis Morel, STUDY: XR HAND LEFT 3+ VIEWS  12/3/2023 2:56 pm   INDICATION: Signs/Symptoms:attention left hand   COMPARISON: None.   ACCESSION NUMBER(S): [id number]   ORDERING CLINICIAN: TISH SHUKLA   TECHNIQUE: Three views of the left hand including AP, oblique and lateral projections were obtained.   FINDINGS: There is no evidence of acute fracture or dislocation identified. Mild-to-moderate hypertrophic degenerative changes are seen in the trapezium 1st metacarpal articulation. Mild degenerative changes are seen in the 2nd and 3rd metacarpophalangeal joints. Severe degenerative changes are seen in the 3rd and 4th distal interphalangeal joints, with moderate degenerative changes seen in the 2nd and 5th distal interphalangeal joints.       1. No fracture or dislocation. 2. Degenerative changes, as described above.   MACRO: None.   Signed by: Louis Morel 12/3/2023 3:02 PM Dictation workstation:   [id number]    Scheduled medications  piperacillin-tazobactam, 2.25 g, intravenous, q6h      Continuous medications  norepinephrine, 0-3.3 mcg/kg/min, Last Rate: 0.09 mcg/kg/min (12/03/23 2041)      PRN medications    Results for orders placed or performed during the " hospital encounter of 12/03/23 (from the past 24 hour(s))   CBC and Auto Differential   Result Value Ref Range    WBC 12.4 (H) 4.4 - 11.3 x10*3/uL    nRBC 0.0 0.0 - 0.0 /100 WBCs    RBC 4.43 4.00 - 5.20 x10*6/uL    Hemoglobin 13.6 12.0 - 16.0 g/dL    Hematocrit 48.0 (H) 36.0 - 46.0 %     (H) 80 - 100 fL    MCH 30.7 26.0 - 34.0 pg    MCHC 28.3 (L) 32.0 - 36.0 g/dL    RDW 15.2 (H) 11.5 - 14.5 %    Platelets 124 (L) 150 - 450 x10*3/uL    Neutrophils % 90.8 40.0 - 80.0 %    Immature Granulocytes %, Automated 0.2 0.0 - 0.9 %    Lymphocytes % 5.1 13.0 - 44.0 %    Monocytes % 3.0 2.0 - 10.0 %    Eosinophils % 0.6 0.0 - 6.0 %    Basophils % 0.3 0.0 - 2.0 %    Neutrophils Absolute 11.29 (H) 1.60 - 5.50 x10*3/uL    Immature Granulocytes Absolute, Automated 0.03 0.00 - 0.50 x10*3/uL    Lymphocytes Absolute 0.63 (L) 0.80 - 3.00 x10*3/uL    Monocytes Absolute 0.37 0.05 - 0.80 x10*3/uL    Eosinophils Absolute 0.07 0.00 - 0.40 x10*3/uL    Basophils Absolute 0.04 0.00 - 0.10 x10*3/uL   Lactate   Result Value Ref Range    Lactate 2.2 (H) 0.4 - 2.0 mmol/L   Magnesium   Result Value Ref Range    Magnesium 2.36 1.60 - 2.40 mg/dL   Comprehensive metabolic panel   Result Value Ref Range    Glucose 217 (H) 74 - 99 mg/dL    Sodium 169 (HH) 136 - 145 mmol/L    Potassium 4.1 3.5 - 5.3 mmol/L    Chloride 126 (H) 98 - 107 mmol/L    Bicarbonate 29 21 - 32 mmol/L    Anion Gap 18 10 - 20 mmol/L    Urea Nitrogen 104 (HH) 6 - 23 mg/dL    Creatinine 2.05 (H) 0.50 - 1.05 mg/dL    eGFR 23 (L) >60 mL/min/1.73m*2    Calcium 8.2 (L) 8.6 - 10.3 mg/dL    Albumin 2.7 (L) 3.4 - 5.0 g/dL    Alkaline Phosphatase 88 33 - 136 U/L    Total Protein 5.3 (L) 6.4 - 8.2 g/dL    AST 24 9 - 39 U/L    Bilirubin, Total 0.8 0.0 - 1.2 mg/dL    ALT 20 7 - 45 U/L   aPTT   Result Value Ref Range    aPTT 39 (H) 27 - 38 seconds   Protime-INR   Result Value Ref Range    Protime 17.6 (H) 9.8 - 12.8 seconds    INR 1.6 (H) 0.9 - 1.1   Lactate   Result Value Ref Range     Lactate 1.5 0.4 - 2.0 mmol/L   Urinalysis with Reflex Microscopic and Culture   Result Value Ref Range    Color, Urine Amisha (N) Straw, Yellow    Appearance, Urine Hazy (N) Clear    Specific Gravity, Urine 1.018 1.005 - 1.035    pH, Urine 5.0 5.0, 5.5, 6.0, 6.5, 7.0, 7.5, 8.0    Protein, Urine 30 (1+) (N) NEGATIVE mg/dL    Glucose, Urine NEGATIVE NEGATIVE mg/dL    Blood, Urine NEGATIVE NEGATIVE    Ketones, Urine NEGATIVE NEGATIVE mg/dL    Bilirubin, Urine NEGATIVE NEGATIVE    Urobilinogen, Urine <2.0 <2.0 mg/dL    Nitrite, Urine NEGATIVE NEGATIVE    Leukocyte Esterase, Urine LARGE (3+) (A) NEGATIVE   Extra Urine Gray Tube   Result Value Ref Range    Extra Tube Hold for add-ons.    Microscopic Only, Urine   Result Value Ref Range    WBC, Urine >50 (A) 1-5, NONE /HPF    WBC Clumps, Urine MANY Reference range not established. /HPF    RBC, Urine >20 (A) NONE, 1-2, 3-5 /HPF    Squamous Epithelial Cells, Urine 1-9 (SPARSE) Reference range not established. /HPF    Bacteria, Urine 4+ (A) NONE SEEN /HPF    Mucus, Urine 2+ Reference range not established. /LPF    Hyaline Casts, Urine 3+ (A) NONE /LPF        Assessment/Plan     83-year-old female with underlying dementia A&O x 0 with soft tissue necrosis on the dorsal aspect of left thumb    Plan:  The patient's left thumb was anesthetized with 1% plain lidocaine using a digital block.  Her wound was copiously irrigated with normal saline and then cleansed with chlorhexidine as she is allergic to iodine.  After the thumb was appropriately anesthetized, it was then debrided at bedside down to healthy bleeding tissue.  There remains a layer of dermis protecting the underlying structures.  The patient's wound was then covered with a petroleum gauze, 4 x 4's, Kerlix and Ace.  Given that the patient has been sucking on her thumb to the point of self-harm, she was placed in bilateral mitts.    Although the patient did have necrosis to the soft tissue of her thumb, I do not suspect  that this is the source of her underlying sepsis as the infection was relatively superficial.  Remainder of workup for sepsis and decision per ABX, DVT ppx, and disposition  per primary team.    No acute orthopedic surgical intervention beyond bedside debridement is required at this time  Recommend wound care consult for the left thumb wound.    The orthopedic hand team will follow the patient peripherally, and any further questions or concerns can be reached via secure chat    First call: Jacob Souza DO PGY2  Second Call: Nura Escalera MD PGY4      This consult was seen within 30 minutes of being contacted   Staffed with on call hand attending, Dr. Divya Dillard, DO

## 2023-12-04 NOTE — PROGRESS NOTES
Speech-Language Pathology    Inpatient Speech-Language Pathology Clinical Swallow Evaluation    Patient Name: Nay Frost  MRN: [id number]  Today's Date: 12/4/2023   Time Calculation  Start Time: 1335  Stop Time: 1358  Time Calculation (min): 23 min         Current Problem:   1. Septic shock (CMS/HCC)        2. Infection of thumb        History:   Nay Frost is a 83 y.o. female with Alzheimer's disease (nonverbal at baseline), Crohn's disease, and seizures who presents as a transfer from [facility] with presumed septic shock from infection of left thumb.       Recommendations:  Solid Diet Recommendations : NPO  Liquid Diet Recommendations: NPO    - Frequent, aggressive oral care is strongly recommended to improve infection control as well as reduce dental plaque and bacteria on oropharyngeal surfaces which may increase the risk nosocomial infections, including pneumonia.    - OK for small amounts of ice chips (one at a time, 10x/hour) for pleasure/swallow stimulation, but only after aggressive oral care to avoid colonization of bacteria within oral cavity.        Assessment:  Clinical swallow evaluation completed. Pt alert, responds to name, oriented x0. Manipulated ice chips x2 and single sips of water x3 without overt s/s of aspiration. Pt exhibited difficulty following directions to complete Ella protocol-3 oz water challenge. Pt consumed a few sips at a time then stopped for breath. After 3rd trial post prandial cough evident which indicated symptoms suggestive of pharyngeal dysphagia. Deferred further PO trials due to high risk for aspiration. Pt will need a MBSS to fully evaluated swallow function prior to any PO recommendations. Discussed results and recommendations with RN and MD.        Plan:  SLP Plan: Other (Comment) (Pending MBSS)  Discussed POC: Nursing, Physician (Pt unable to comprehend information.)  SLP - OK to Discharge: Yes      Pain:  Pain Assessment: CPOT (Critical  Care Pain Observation Tool)  Pain Score: 0 - No pain       Oral/Motor Assessment:  Dentition: Some Missing Teeth          Inpatient:  Education Documentation  Education provided to pt re: current swallow function, recommendations/results and dysphagia POC questionable understanding due to dementia.           Consultations/Referrals/Coordination of Services: N/A

## 2023-12-04 NOTE — PROGRESS NOTES
Occupational Therapy    Evaluation    Patient Name: Nay Frost  MRN: [id number]  Today's Date: 12/4/2023  Time Calculation  Start Time: 1028  Stop Time: 1050  Time Calculation (min): 22 min    Assessment  IP OT Assessment  OT Assessment: impaired ADLs/transfers  Prognosis: Fair  End of Session Communication: Bedside nurse  End of Session Patient Position: Bed, 3 rail up, Alarm off, not on at start of session ((R) wrist restraint secured, (L) mitt donned)  Plan:  Treatment Interventions: ADL retraining, Functional transfer training, Patient/family training, Equipment evaluation/education  OT Frequency: 2 times per week  OT Discharge Recommendations: Moderate intensity level of continued care  OT - OK to Discharge: Yes    Subjective   General:  General  Reason for Referral: sepsis, PNA, UTI; patient sucking and biting on L thumb at nursing home (memory care at [facility]), causing necrotic tissue, ortho completed a debridement, no further surgical intervention per ortho note, x-ray L hand (-) fx;  Past Medical History Relevant to Rehab: Alzheimer's disease (minimally verbal at baseline), Crohn's disease, and seizures; hx R femoral neck fx s/p R hip hemiarthroplasty 10/11/23 (WBAT RLE, R posterior hip precautions).  Family/Caregiver Present: No  Co-Treatment: PT  Co-Treatment Reason: hx advanced Alzheimer's, minimally verbal at baseline; recent R GASTON (10/11/23), from memory care unit.  Prior to Session Communication: Bedside nurse  Patient Position Received: Bed, 3 rail up, Alarm off, not on at start of session ((R) wrist restraint secured, (L) mitt donned)  General Comment: Patient alert on arrival, minimal verbal response, only spoke 1-2 words during session, illogical. 5 mg dopamine, 0.1 Levo  Precautions:  Hearing/Visual Limitations: difficult to formally assess d/t impaired cognition, verbal response  LE Weight Bearing Status:  (WBAT (R)LE)  Medical Precautions: Fall precautions, Oxygen therapy device  and L/min  Post-Surgical Precautions: Right hip precautions  Vital Signs:  Heart Rate:  (pre 59, post 50)  Resp:  (pre 15, post 12)  SpO2:  (pre 100%, post 100%)  BP:  (pre 112/54, post 108/49)  MAP (mmHg):  (pre: 78, during session, MAP dropped to high 50s, post: 72; on .1 levo, RN increased to .13 levo)  BP Method: Arterial line  Pain:  Pain Assessment  Pain Assessment: 0-10  Pain Score:  (patient did not report pain)    Objective   Cognition:  Overall Cognitive Status: Impaired  Arousal/Alertness: Inconsistent responses to stimuli  Orientation Level:  (did not answer orientation questions)  Following Commands:  (patient did not appear to follow any commands)    Home Living:  Type of Home:  (per chart review, patient from [facility])   Prior Function:  Level of [place]: Unable to asses at this time (from PT christos in 10/23: per SW at [facility]-> maximal assist for bed mobility, x1 assist for transfers, total A for bathing/toileting, requires set-up for feeding, indep ambulation without device (?))  Receives Help From:  (staff at facility)  IADL History:     ADL:  Eating Assistance: Moderate  Eating Deficit:  (anticipated)  Grooming Assistance: Moderate  Grooming Deficit:  (patient able to bring hands to face)  Bathing Assistance: Total  Bathing Deficit:  (anticipated)  UE Dressing Assistance: Maximal  UE Dressing Deficit:  (anticipated)  LE Dressing Assistance: Total  LE Dressing Deficit: Don/doff R sock, Don/doff L sock  Toileting Assistance with Device: Total  Toileting Deficit:  (anticipated)     Bed Mobility/Transfers: Bed Mobility  Bed Mobility: Yes  Bed Mobility 1  Bed Mobility 1: Supine to sitting, Sitting to supine  Level of Assistance 1: Dependent, Maximum verbal cues, Maximum tactile cues  Bed Mobility Comments 1: x2 assist, HOB elevated, draw sheet    Transfers  Transfer: No    Sitting Balance:  Static Sitting Balance  Static Sitting-Level of Assistance:  (max initially,  progressed to min)  Dynamic Sitting Balance  Dynamic Sitting-Balance:  (max to min A)     Vision: Vision - Basic Assessment  Current Vision:  (patient looks to left and right of midline, unable to report if she uses corrective lenses)     Strength:  Strength Comments: patient not following MMT commands, does move (B)UEs spontaneously and when attempting to remove (L) mitt     Extremities: RUE   RUE :  (PROM WFL) and LUE   LUE:  ((L)UE WFL)    Outcome Measures: [facility] Daily Activity  Putting on and taking off regular lower body clothing: Total  Bathing (including washing, rinsing, drying): A lot  Putting on and taking off regular upper body clothing: A lot  Toileting, which includes using toilet, bedpan or urinal: Total  Taking care of personal grooming such as brushing teeth: A lot  Eating Meals: A lot  Daily Activity - Total Score: 10    , Confusion Assessment Method-ICU (CAM-ICU)  Feature 1: Acute Onset or Fluctuating Course: Positive  Feature 2: Inattention: Positive  Feature 4: Disorganized Thinking: Positive  Overall CAM-ICU: Positive  ,    , and E = Exercise and Early Mobility  Current Activity: Sitting at edge of bed  Education Documentation  Body Mechanics, taught by Lilli Artis OT at 12/4/2023  2:17 PM.  Learner: Patient  Readiness: Nonacceptance  Method: Explanation  Response: No Evidence of Learning    Precautions, taught by Lilli Artis OT at 12/4/2023  2:17 PM.  Learner: Patient  Readiness: Nonacceptance  Method: Explanation  Response: No Evidence of Learning    ADL Training, taught by Lilli Artis OT at 12/4/2023  2:17 PM.  Learner: Patient  Readiness: Nonacceptance  Method: Explanation  Response: No Evidence of Learning    Education Comments  No comments found.      Goals:   Encounter Problems       Encounter Problems (Active)       ADLs       Patient with complete upper body dressing with minimal assist  level of assistance donning and doffing all UE clothes with no adaptive equipment.  (Progressing)       Start:  12/04/23    Expected End:  12/25/23            Patient will feed self with set-up and supervision level of assistance using PRN adaptive equipment. (Progressing)       Start:  12/04/23    Expected End:  12/25/23            Patient will complete daily grooming tasks with set-up and supervision level of assistance and PRN adaptive equipment. (Progressing)       Start:  12/04/23    Expected End:  12/25/23               BALANCE       Pt will maintain dynamic sitting balance during ADL task with contact guard assist level of assistance in order to demonstrate decreased risk of falling and improved postural control. (Progressing)       Start:  12/04/23    Expected End:  12/25/23               MOBILITY       Patient will perform Functional mobility Household distances with moderate assist level of assistance and least restrictive device in order to improve safety and functional mobility. (Progressing)       Start:  12/04/23    Expected End:  12/25/23               TRANSFERS       Patient will perform bed mobility moderate assist level of assistance in order to improve safety and independence with mobility (Progressing)       Start:  12/04/23    Expected End:  12/25/23            Patient will complete functional transfers with least restrictive device with moderate assist level of assistance. (Progressing)       Start:  12/04/23    Expected End:  12/25/23                  MALINI Gar/YING

## 2023-12-04 NOTE — PROCEDURES
Arterial Line Insertion    Date/Time: 12/4/2023 5:14 PM    Performed by: Kenneth Jackson MD  Authorized by: Kenneth Jackson MD    Consent:     Consent obtained:  Written    Risks, benefits, and alternatives were discussed: yes      Risks discussed:  Bleeding, ischemia and infection  Universal protocol:     Immediately prior to procedure, a time out was called: yes      Patient identity confirmed:  Hospital-assigned identification number and arm band  Indications:     Indications: hemodynamic monitoring    Pre-procedure details:     Skin preparation:  Chlorhexidine    Preparation: Patient was prepped and draped in sterile fashion    Anesthesia:     Anesthesia method:  Local infiltration    Local anesthetic:  Lidocaine 1% w/o epi  Procedure details:     Location:  L radial    Needle gauge:  20 G    Placement technique:  Seldinger    Number of attempts:  1    Transducer: waveform confirmed    Post-procedure details:     Post-procedure:  Sterile dressing applied and sutured    Procedure completion:  Tolerated well, no immediate complications

## 2023-12-04 NOTE — PROGRESS NOTES
"Vancomycin Dosing by Pharmacy- INITIAL    Nay Frost is a 83 y.o. year old female who Pharmacy has been consulted for vancomycin dosing for pneumonia. Based on the patient's indication and renal status this patient will be dosed based on a goal trough/random level of 15-20.     Renal function is currently declining.    Visit Vitals  /76   Pulse 55   Temp 35.5 °C (95.9 °F)   Resp 18        Lab Results   Component Value Date    CREATININE 2.02 (H) 12/03/2023    CREATININE 2.05 (H) 12/03/2023    CREATININE 2.05 (H) 12/03/2023    CREATININE 2.05 (H) 12/03/2023        Patient weight is No results found for: \"PTWEIGHT\"    No results found for: \"CULTURE\"     No intake/output data recorded.  [unfilled]    No results found for: \"PATIENTTEMP\"       Assessment/Plan     Patient has already been given a loading dose of 1000 mg.  Will initiate vancomycin dose by level due to poor renal function.  Follow-up level will be ordered on 12/4 at 1500 (24 hr level) unless clinically indicated sooner.  Will continue to monitor renal function daily while on vancomycin and order serum creatinine at least every 48 hours if not already ordered.  Follow for continued vancomycin needs, clinical response, and signs/symptoms of toxicity.       Cecilio Goyal, PharmD       "

## 2023-12-04 NOTE — PROGRESS NOTES
Nay Frost is a 83 y.o. female on day 1 of admission presenting with Septic shock (CMS/HCC).    Subjective   Maintained on dopamine drip overnight, Levophed increased from 0.09 to 0.15 for low pressures. Placed on non-breathing mask after brief episode of desaturation with decreased arousal, immediately awoke and transitioned to 3L NC.    HR improved to 70s, BP trending in 100s-110s/50s. Subjectively, patient appears to be at mental status baseline (awake and alert, but minimally verbal and not following commands).       Objective     Physical Exam  Constitutional:       General: She is not in acute distress.  HENT:      Head: Normocephalic and atraumatic.      Nose: Nose normal.      Mouth/Throat:      Mouth: Mucous membranes are dry.      Pharynx: No oropharyngeal exudate or posterior oropharyngeal erythema.   Eyes:      General: No scleral icterus.     Extraocular Movements: Extraocular movements intact.      Pupils: Pupils are equal, round, and reactive to light.   Cardiovascular:      Rate and Rhythm: Normal rate and regular rhythm.      Pulses: Normal pulses.      Heart sounds: Normal heart sounds.   Pulmonary:      Effort: Pulmonary effort is normal. No respiratory distress.      Breath sounds: Normal breath sounds.   Abdominal:      General: Abdomen is flat. There is no distension.      Palpations: Abdomen is soft.      Tenderness: There is no abdominal tenderness.   Musculoskeletal:         General: Normal range of motion.      Cervical back: Normal range of motion and neck supple.      Right lower leg: No edema.      Left lower leg: No edema.   Skin:     General: Skin is warm and dry.      Findings: No erythema, lesion or rash.   Neurological:      Mental Status: She is alert. Mental status is at baseline.      Comments: Occasionally speaking gibberish, moves all extremities spontaneously   Psychiatric:      Comments: At baseline         Last Recorded Vitals  Blood pressure 111/54, pulse 55,  "temperature 35.6 °C (96.1 °F), temperature source Temporal, resp. rate 20, height 1.6 m (5' 3\"), weight 45.9 kg (101 lb 3.1 oz), SpO2 95 %.  Intake/Output last 3 Shifts:  I/O last 3 completed shifts:  In: 3706.7 (80.8 mL/kg) [I.V.:106.7 (2.3 mL/kg); Blood:500; IV Piggyback:3100]  Out: 475 (10.3 mL/kg) [Urine:475 (0.3 mL/kg/hr)]  Dosing Weight: 45.9 kg     Scheduled medications  atropine, , ,   hydrocortisone sodium succinate, 50 mg, intravenous, q6h  piperacillin-tazobactam, 2.25 g, intravenous, q6h      Continuous medications  DOPamine, 1-5 mcg/kg/min (Dosing Weight), Last Rate: 5 mcg/kg/min (12/04/23 1403)  norepinephrine, 0-3.3 mcg/kg/min, Last Rate: 0.15 mcg/kg/min (12/04/23 0540)  vasopressin, 0.01-0.03 Units/min      PRN medications  PRN medications: atropine, vancomycin      Relevant Results  Results for orders placed or performed during the hospital encounter of 12/03/23 (from the past 24 hour(s))   Type and Screen   Result Value Ref Range    ABO TYPE O     Rh TYPE POS     ANTIBODY SCREEN NEG    Comprehensive metabolic panel   Result Value Ref Range    Glucose 152 (H) 74 - 99 mg/dL    Sodium 167 (HH) 136 - 145 mmol/L    Potassium 4.0 3.5 - 5.3 mmol/L    Chloride 130 (H) 98 - 107 mmol/L    Bicarbonate 29 21 - 32 mmol/L    Anion Gap 12 10 - 20 mmol/L    Urea Nitrogen 115 (HH) 6 - 23 mg/dL    Creatinine 2.05 (H) 0.50 - 1.05 mg/dL    eGFR 23 (L) >60 mL/min/1.73m*2    Calcium 9.2 8.6 - 10.6 mg/dL    Albumin 3.2 (L) 3.4 - 5.0 g/dL    Alkaline Phosphatase 97 33 - 136 U/L    Total Protein 6.1 (L) 6.4 - 8.2 g/dL    AST 31 9 - 39 U/L    Bilirubin, Total 0.9 0.0 - 1.2 mg/dL    ALT 26 7 - 45 U/L   Renal Function Panel   Result Value Ref Range    Glucose 152 (H) 74 - 99 mg/dL    Sodium 167 (HH) 136 - 145 mmol/L    Potassium 4.0 3.5 - 5.3 mmol/L    Chloride 130 (H) 98 - 107 mmol/L    Bicarbonate 29 21 - 32 mmol/L    Anion Gap 12 10 - 20 mmol/L    Urea Nitrogen 115 (HH) 6 - 23 mg/dL    Creatinine 2.05 (H) 0.50 - 1.05 mg/dL "    eGFR 23 (L) >60 mL/min/1.73m*2    Calcium 9.2 8.6 - 10.6 mg/dL    Phosphorus 3.7 2.5 - 4.9 mg/dL    Albumin 3.2 (L) 3.4 - 5.0 g/dL   Renal Function Panel   Result Value Ref Range    Glucose 182 (H) 74 - 99 mg/dL    Sodium 165 (HH) 136 - 145 mmol/L    Potassium 4.0 3.5 - 5.3 mmol/L    Chloride 129 (H) 98 - 107 mmol/L    Bicarbonate 28 21 - 32 mmol/L    Anion Gap 12 10 - 20 mmol/L    Urea Nitrogen 112 (HH) 6 - 23 mg/dL    Creatinine 2.02 (H) 0.50 - 1.05 mg/dL    eGFR 24 (L) >60 mL/min/1.73m*2    Calcium 9.1 8.6 - 10.6 mg/dL    Phosphorus 3.4 2.5 - 4.9 mg/dL    Albumin 3.1 (L) 3.4 - 5.0 g/dL   CBC and Auto Differential   Result Value Ref Range    WBC 12.7 (H) 4.4 - 11.3 x10*3/uL    nRBC 0.0 0.0 - 0.0 /100 WBCs    RBC 3.85 (L) 4.00 - 5.20 x10*6/uL    Hemoglobin 12.0 12.0 - 16.0 g/dL    Hematocrit 39.8 36.0 - 46.0 %     (H) 80 - 100 fL    MCH 31.2 26.0 - 34.0 pg    MCHC 30.2 (L) 32.0 - 36.0 g/dL    RDW 15.1 (H) 11.5 - 14.5 %    Platelets 107 (L) 150 - 450 x10*3/uL    Neutrophils % 89.6 40.0 - 80.0 %    Immature Granulocytes %, Automated 0.2 0.0 - 0.9 %    Lymphocytes % 6.3 13.0 - 44.0 %    Monocytes % 2.7 2.0 - 10.0 %    Eosinophils % 0.9 0.0 - 6.0 %    Basophils % 0.3 0.0 - 2.0 %    Neutrophils Absolute 11.39 (H) 1.60 - 5.50 x10*3/uL    Immature Granulocytes Absolute, Automated 0.03 0.00 - 0.50 x10*3/uL    Lymphocytes Absolute 0.80 0.80 - 3.00 x10*3/uL    Monocytes Absolute 0.35 0.05 - 0.80 x10*3/uL    Eosinophils Absolute 0.12 0.00 - 0.40 x10*3/uL    Basophils Absolute 0.04 0.00 - 0.10 x10*3/uL   TSH with reflex to Free T4 if abnormal   Result Value Ref Range    Thyroid Stimulating Hormone 2.25 0.44 - 3.98 mIU/L   Vancomycin   Result Value Ref Range    Vancomycin 14.9 5.0 - 20.0 ug/mL   Sars-CoV-2 and Influenza A/B PCR   Result Value Ref Range    Flu A Result Not Detected Not Detected    Flu B Result Not Detected Not Detected    Coronavirus 2019, PCR Not Detected Not Detected   CBC and Auto Differential    Result Value Ref Range    WBC 13.4 (H) 4.4 - 11.3 x10*3/uL    nRBC 0.0 0.0 - 0.0 /100 WBCs    RBC 3.63 (L) 4.00 - 5.20 x10*6/uL    Hemoglobin 11.3 (L) 12.0 - 16.0 g/dL    Hematocrit 39.3 36.0 - 46.0 %     (H) 80 - 100 fL    MCH 31.1 26.0 - 34.0 pg    MCHC 28.8 (L) 32.0 - 36.0 g/dL    RDW 15.0 (H) 11.5 - 14.5 %    Platelets 110 (L) 150 - 450 x10*3/uL    Neutrophils % 89.6 40.0 - 80.0 %    Immature Granulocytes %, Automated 0.3 0.0 - 0.9 %    Lymphocytes % 4.6 13.0 - 44.0 %    Monocytes % 4.5 2.0 - 10.0 %    Eosinophils % 0.7 0.0 - 6.0 %    Basophils % 0.3 0.0 - 2.0 %    Neutrophils Absolute 12.00 (H) 1.60 - 5.50 x10*3/uL    Immature Granulocytes Absolute, Automated 0.04 0.00 - 0.50 x10*3/uL    Lymphocytes Absolute 0.61 (L) 0.80 - 3.00 x10*3/uL    Monocytes Absolute 0.60 0.05 - 0.80 x10*3/uL    Eosinophils Absolute 0.09 0.00 - 0.40 x10*3/uL    Basophils Absolute 0.04 0.00 - 0.10 x10*3/uL   Hepatic Function Panel   Result Value Ref Range    Albumin 3.1 (L) 3.4 - 5.0 g/dL    Bilirubin, Total 0.9 0.0 - 1.2 mg/dL    Bilirubin, Direct 0.3 0.0 - 0.3 mg/dL    Alkaline Phosphatase 92 33 - 136 U/L    ALT 26 7 - 45 U/L    AST 28 9 - 39 U/L    Total Protein 5.6 (L) 6.4 - 8.2 g/dL   Magnesium   Result Value Ref Range    Magnesium 3.12 (H) 1.60 - 2.40 mg/dL   Phosphorus   Result Value Ref Range    Phosphorus 3.1 2.5 - 4.9 mg/dL   Basic Metabolic Panel   Result Value Ref Range    Glucose 140 (H) 74 - 99 mg/dL    Sodium 166 (HH) 136 - 145 mmol/L    Potassium 4.0 3.5 - 5.3 mmol/L    Chloride 131 (H) 98 - 107 mmol/L    Bicarbonate 29 21 - 32 mmol/L    Anion Gap 10 10 - 20 mmol/L    Urea Nitrogen 104 (HH) 6 - 23 mg/dL    Creatinine 1.80 (H) 0.50 - 1.05 mg/dL    eGFR 27 (L) >60 mL/min/1.73m*2    Calcium 8.8 8.6 - 10.6 mg/dL   Protime-INR   Result Value Ref Range    Protime 16.3 (H) 9.8 - 12.8 seconds    INR 1.4 (H) 0.9 - 1.1   Basic metabolic panel   Result Value Ref Range    Glucose 169 (H) 74 - 99 mg/dL    Sodium 164 (HH) 136  - 145 mmol/L    Potassium 4.1 3.5 - 5.3 mmol/L    Chloride 130 (H) 98 - 107 mmol/L    Bicarbonate 18 (L) 21 - 32 mmol/L    Anion Gap 20 10 - 20 mmol/L    Urea Nitrogen 90 (H) 6 - 23 mg/dL    Creatinine 1.65 (H) 0.50 - 1.05 mg/dL    eGFR 30 (L) >60 mL/min/1.73m*2    Calcium 8.4 (L) 8.6 - 10.6 mg/dL   Blood Gas Venous Full Panel   Result Value Ref Range    POCT pH, Venous 7.31 (L) 7.33 - 7.43 pH    POCT pCO2, Venous 55 (H) 41 - 51 mm Hg    POCT pO2, Venous 44 35 - 45 mm Hg    POCT SO2, Venous 60 45 - 75 %    POCT Oxy Hemoglobin, Venous 58.4 45.0 - 75.0 %    POCT Hematocrit Calculated, Venous 32.0 (L) 36.0 - 46.0 %    POCT Sodium, Venous 163 (HH) 136 - 145 mmol/L    POCT Potassium, Venous 4.2 3.5 - 5.3 mmol/L    POCT Chloride, Venous 130 (H) 98 - 107 mmol/L    POCT Ionized Calicum, Venous 1.25 1.10 - 1.33 mmol/L    POCT Glucose, Venous 184 (H) 74 - 99 mg/dL    POCT Lactate, Venous 1.5 0.4 - 2.0 mmol/L    POCT Base Excess, Venous 0.7 -2.0 - 3.0 mmol/L    POCT HCO3 Calculated, Venous 27.7 (H) 22.0 - 26.0 mmol/L    POCT Hemoglobin, Venous 10.7 (L) 12.0 - 16.0 g/dL    POCT Anion Gap, Venous 10.0 10.0 - 25.0 mmol/L    Patient Temperature 37.0 degrees Celsius    FiO2 21 %              Imaging:   XR CHEST 1 VIEW;  12/4/2023 8:23 am   IMPRESSION:  1. Right perihilar opacity, increased compared to prior study.  Correlate with concern for increasing infiltrate and infection.  Recommend follow-up radiograph with  2. No sizable pleural effusion or pneumothorax    Assessment/Plan   Principal Problem:    Septic shock (CMS/HCC)    Nay Frost is a 83 y.o. female with Alzheimer's disease (minimally conversant at baseline), Crohn's disease, and seizures who presents as a transfer from [facility] with presumed septic shock, with suspected soft tissue vs respiratory vs urinary source. ICU course complicated by new-onset sinus bradycardia with 1st degree AV block requiring dopamine drip.    Patient alert this AM, still  requiring pressors and dopamine gtt for hemodynamic support. Attempted weaning Levo during rounds but MAPs did not tolerate.     Serum sodium improved to 164 from 169 at time of admission (3 PM yesterday) with isotonic fluid resuscitation.    Updates 12/4:  -Will place left arterial line for better blood pressure monitoring (consent already in chart)  -Will start epinephrine given ongoing need for pressor/HR support,  wean dopamine and Levo as able  -Continue vanc/Zosyn, follow-up cultures  -Speech consult placed for swallow eval, family ok with NG/OG if needed    NEURO  #AMS on Alzheimer's dementia  -likely multifactorial in setting of hypernatremia, septic shock, and uremia  -Appears to be close to mental status baseline (awake but minimally verbal, not oriented)  -holding home donepezil and trazodone while NPO     PULM  #Concern for RUL/RLL pneumonia  -GGOs noted in RUL and RLL on CT chest, may represent HAP vs aspiration pneumonia  -MRSA nares pending  -Abx regimen as per ID section below     CV  #Shock, most likely mixed septic and hypovolemic  -S/p 3L NS overnight plus 500 mL albumin  -Na elevated to 169 on admission, family concerned that patient has not been kept hydrated at nursing facility  -Starting epinephrine and weaning levophed and dopamine as able   -Abx as per below  -Starting D5-1/2 NS as per below     #Sinus bradycardia  #First degree AV block  -HR improved on dopamine drip, currently trending in 70s  -May represent paradoxical response to sepsis vs SSS  -TSH WNL (2.25)  -Will continue to monitor with correction of underlying electrolyte abnormalities, may consider cardiology consult if unresponsive to correction of reversible causes    FEN/GI  #Diet/enteral access  #Malnutrition  -NPO for AMS and critical illness  -Speech consulted for swallow evaluation  -Nutrition following, appreciate recs  -Family ok with NG/OG placement if needed     RENAL/  #HI, likely pre-renal in setting of shock  -Cr  2.1 on admission, bl 1.0  -Cr improved to 1.8 after fluid resuscitation  -Will continue to monitor, consider urine electrolytes if creatinine not down-trending with improved hemodynamics     #Hypernatremia, acute on chronic  -baseline 145-150, admission 169 (12/3 at 3 PM)  -s/p 3L NS at OSH ED  -s/p 3L NS and albumin bolus overnight in unit  -Na improved to 164 this morning from 169   -FWD with goal Na of 159: 0.6 L  -Will start D5-1/2NS at 50 mL/hr  -Continue trending with RFP q4h     HEME/ONC  #Chronic anemia   #Chronic thrombocytopenia  -Hemoglobin of 11.3, currently at baseline (11-12)  -Platelet count of 110, at baseline ()  -Unclear etiology of chronic thrombocytopenia, appears to have been managed conservatively/with observation in the past  -Will continue to monitor, maintain active type and screen    ID  #Septic shock   -source(s): right perihilar pneumonia, UTI  -UCx pending  -Blood cx NGTD  -Continue vanc/zosyn  -MRSA nares pending  -Ideally would send respiratory cultures but patient unable to produce sputum     F: D5W-1/2NS at 50 mL/hr  E: PRN  N: NPO for AMS, speech eval pending  A: R fem triple lumen CVC, PIV, left radial arterial line  O2: 3L NC  Drips: epinephrine 0.3, Levo 0.05  Abx: vanc/zosyn  DVT PPx: SQH    CODE STATUS: DNAR/DNI, no dialysis, ok for one attempt at defib if reversible (confirmed with daughter on admission)  SURROGATE DECISION MAKER: Daughter Zeenat [phone] (cell)  Second call: Hector Walsh (son-in-law) [phone], home [phone], office [phone]         Margaux Dailey MD

## 2023-12-04 NOTE — ED PROVIDER NOTES
CC: thumb (Patient transfer from [facility] ER to be eveluated by hand for infected left thumb from patient chewing it on it patient is confused A & O x 1)     HPI:   Patient is a 83-year-old female who is nonverbal at baseline, history of advanced dementia, recent Del hip arthroplasty, chron's disease, hypertension, hyperlipidemia, insomnia, migraine, osteoporosis, seizures presenting as a transfer from [facility] due to concerns of septic shock and extensive infection of her left upper extremity.  Patient was brought in from her SNF due to a left thumb infection that she has secondary to sucking on it persistently secondary to her advanced dementia.  Patient's workup outpatient hospital also showed that she has a UTI.  She has been covered broadly with vancomycin and Zosyn.  Patient is currently on Levophed at 0.09 with blood pressure of 118/83.  She is afebrile without any respiratory distress.  She is intermittently bradycardic to less than 45 and sinus rhythm.  Patient is ANO x 0 and nonverbal at this time.  Antibiotics were continued and Levophed is also running through her right femoral central line.    Limitations to History: nonverbal  Additional History Obtained from: EMS    PMHx/PSHx:  Per HPI.   - has a past medical history of Alzheimer's disease (CMS/[facility]), Amnesia, COVID-19, Crohn's disease (CMS/[facility]), Dementia (CMS/[facility]), Hip fracture, right (CMS/[facility]) (10/09/2023), Hyperlipidemia, Hypertension, Insomnia, Migraine, Osteoporosis, and Seizures (CMS/[facility]).  - has no past surgical history on file.    Social History:  - Tobacco:  reports that she has never smoked. She has never been exposed to tobacco smoke. She has never used smokeless tobacco.   - Alcohol:  reports that she does not currently use alcohol.   - Drugs:  reports that she does not currently use drugs.     Medications: Reviewed in EMR.     Allergies:  Bee pollen, Bee venom protein (honey bee), Erythromycin, [place] flavor, Iodine, Nut - unspecified,  Pneumococcal vaccine, Shellfish derived, Strawberry, Sulfa (sulfonamide antibiotics), and Tree nuts    ???????????????????????????????????????????????????????????????  Triage Vitals:  T 36.9 °C (98.4 °F)  HR 51  /83  RR 20  O2 98 % None (Room air)    Physical Exam  Vitals and nursing note reviewed.   Constitutional:       General: She is not in acute distress.     Appearance: She is well-developed.      Comments: Cachetic and nonverbal   HENT:      Head: Normocephalic and atraumatic.      Mouth/Throat:      Mouth: Mucous membranes are dry.      Pharynx: Oropharynx is clear.   Eyes:      Conjunctiva/sclera: Conjunctivae normal.   Cardiovascular:      Rate and Rhythm: Normal rate and regular rhythm.      Heart sounds: No murmur heard.  Pulmonary:      Effort: Pulmonary effort is normal. No respiratory distress.      Breath sounds: Normal breath sounds. No wheezing, rhonchi or rales.   Abdominal:      General: There is no distension.      Palpations: Abdomen is soft.      Tenderness: There is no abdominal tenderness. There is no guarding or rebound.   Musculoskeletal:         General: No deformity.      Cervical back: Normal range of motion.   Skin:     General: Skin is warm and dry.      Capillary Refill: Capillary refill takes less than 2 seconds.      Findings: Lesion (around L thumb with erythema along the entirety of the thumb. Scabbed off skin with central area of granulation tissue vs pus.) present.      Comments: R fem line in place   Neurological:      Comments: AAO x 0 with advanced dementia at baseline       ???????????????????????????????????????????????????????????????  EKG (per my interpretation):  Sinus bradycardia rhythm with a rate of 39.  First-degree heart block with a AK interval of 214.  No QRS provocation.  QTc 440 with normal axis.  No acute ST or T wave changes noted.  No STEMI.    ED Course/Medical Decision Making:  Patient is a 83-year-old female who is nonverbal at baseline  presenting due to septic shock from [facility].  Patient had broad workup initiated that shows hyponatremia to 169 that appears chronic for her but slightly higher than her baseline.  She is pending redraw of labs.  Patient had retiming of Zosyn and vancomycin and CT of her chest abdomen pelvis resulted that shows concern for right upper and lower lobe pneumonia and age-indeterminate multiple thoracic and L5 fractures.  Patient also appears to have a UTI based on her urinalysis with large amount of leukocyte Estrace and 4+ bacteria, RBCs and WBCs noted.  Patient's lactate was initially 1.5.  MICU accepted the patient given the fact that she is in septic shock on pressors and orthopedic hand was consulted given her complicated left thumb wound and performed a bedside debridement.  Patient has a layer of skin protecting her thumb and orthopedic hand signed off and reported that wound care can manage her hand at this time.  There is no acute surgical intervention required.  Patient's outpatient records were obtained and reviewed indicating that ethics was consulted given the fact that the family is difficult to reach.  However I was able to reach family and confirm patient's DNR, DNI status.  Patient's daughter is comfortable with resuscitation but would not like chest compressions or intubation was performed should her heart or breathing stop.  Patient intermittently prior to going up to the MICU went into SVT, then V. tach, then torsades and converted spontaneously.  Given patient's CODE STATUS, patient was loaded with 2 g of magnesium over 20 minutes and also started on amiodarone 300 mg bolus.  Patient was sent to the MICU in critical condition    External records reviewed: recent inpatient, clinic, and prior ED notes  Diagnostic imaging independently reviewed/interpreted by me (as reflected in MDM) includes: CT C/A/P  Social Determinants Affecting Care:   Discussion of management with other providers: MICU, Ortho  Hand  Prescription Drug Consideration: none  Escalation of Care: MICU    Impression:   Septic Shock  Thumb Lesion    Disposition: Admitted      Critical Care    Performed by: Rachelle Diamond MD  Authorized by: Rachelle Diamond MD    Critical care provider statement:     Critical care time (minutes):  50    Critical care time was exclusive of:  Separately billable procedures and treating other patients and teaching time    Critical care was necessary to treat or prevent imminent or life-threatening deterioration of the following conditions:  Sepsis, shock and circulatory failure    Critical care was time spent personally by me on the following activities:  Blood draw for specimens, ordering and performing treatments and interventions, ordering and review of laboratory studies, review of old charts, re-evaluation of patient's condition, pulse oximetry, discussions with consultants, evaluation of patient's response to treatment, examination of patient and obtaining history from patient or surrogate    I assumed direction of critical care for this patient from another provider in my specialty: no     ? [employer] last updated 12/3/2023 8:48 PM     Niya Hyde  PGY-2 Emergency Medicine  [facility]     Niya Hyde MD  Resident  12/03/23 2054       Niya Hyde MD  Resident  12/03/23 2134    Emergency Medicine Attending Attestation:     Diagnoses as of 12/11/23 1331   Septic shock (CMS/HCC)   Infection of thumb       The patient was seen by the resident/fellow.  I have personally performed a substantive portion of the encounter.  I have seen and examined the patient; agree with the workup, evaluation, MDM, management and diagnosis.  The care plan has been discussed with the resident; I have reviewed the resident’s note and agree with the documented findings.      I independently interpreted patient's EKG and agree with the above mentioned interpretation.       Patient with septic shock, transferred in from outside hospital.  Patient already had blood cultures lactates drawn.  She is currently having a central line and is on norepinephrine.  Patient has some bradycardia has been present since the outside hospital.  He did have 1 episode here in the ED where it looked like V. tach that devolved into a brief torsades versus V-fib, immediately attended to patient's bedside and she was still awake and it terminated itself quickly.  Printed out rhythm strips and sent with patient to the ICU.  Patient seen by orthopedics discussed the case they did not feel like this was necessarily the source but they did debride the section on her thumb.  Patient was placed in soft mitts just to assist with her not continuing to bite at that thumb which was already quite injured.  Did not require any hard or violent restraints.    Patient was given an amnio load as well as magnesium and started on a drip given this potential V-fib episode.  Did also discussed with the patient's daughter she is a DNR/DNI but acceptable for other escalation of care..  Updated the MICU team about this rhythm episode.  MD Rachelle Shaver MD  12/11/23 [id number]

## 2023-12-04 NOTE — PROGRESS NOTES
Spiritual Care Visit    Clinical Encounter Type  Visited With: Patient  Routine Visit: Introduction  Continue Visiting: Yes    Jew Encounters  Jew Needs: Prayer         Sacramental Encounters  Sacrament of Sick-Anointing: Anointed    Patient received the Sacrament of the Anointing of the Sick by Fr. Bert Katz,  [employer] .        Within the [employer] the Sacrament of the Sick, also known as the Anointing of the Sick, is a prayer in which we invoke the healing power of God.  Along with Eucharist and Reconciliation it is a repeatable sacrament and should be received by anyone about to undergo surgery, those in recovery and the elderly.  This IS NOT 'Last Rites' nor does the [employer] have such a ritual.  Those who are actively dying should receive the Anointing of the Sick for physical and spiritual healing.

## 2023-12-04 NOTE — CARE PLAN
Problem: Pain - Adult  Goal: Verbalizes/displays adequate comfort level or baseline comfort level  Outcome: Progressing     Problem: Safety - Adult  Goal: Free from fall injury  Outcome: Progressing     Problem: Discharge Planning  Goal: Discharge to home or other facility with appropriate resources  Outcome: Progressing     Problem: Chronic Conditions and Co-morbidities  Goal: Patient's chronic conditions and co-morbidity symptoms are monitored and maintained or improved  Outcome: Progressing     Problem: Safety - Medical Restraint  Goal: Remains free of injury from restraints (Restraint for Interference with Medical Device)  Outcome: Progressing  Goal: Free from restraint(s) (Restraint for Interference with Medical Device)  Outcome: Progressing     Problem: Skin  Goal: Decreased wound size/increased tissue granulation at next dressing change  Outcome: Progressing  Goal: Participates in plan/prevention/treatment measures  Outcome: Progressing  Goal: Prevent/manage excess moisture  Outcome: Progressing  Goal: Prevent/minimize sheer/friction injuries  Outcome: Progressing  Goal: Promote/optimize nutrition  Outcome: Progressing  Goal: Promote skin healing  Outcome: Progressing   The patient's goals for the shift include      The clinical goals for the shift include stabilize pt's blood pressure

## 2023-12-04 NOTE — PROGRESS NOTES
SOCIAL WORK NOTE   SW spoke with daughter for assessment (please see flowsheets for further details). Per notes patient is nonverbal at baseline. Patient has been at [facility] for 2-3 years. Daughter states that patient is able to go back, but would prefer to consider a different facility. Skilled per PT/OT notes. Daughter is traveling back, and requested SW follow up with list tomorrow or Wednesday. Social work to follow.   Marce Marquez, ABELARDO, LISW-S ([id number])

## 2023-12-05 ENCOUNTER — APPOINTMENT (OUTPATIENT)
Dept: CARDIOLOGY | Facility: HOSPITAL | Age: 83
DRG: 871 | End: 2023-12-05
Payer: COMMERCIAL

## 2023-12-05 ENCOUNTER — HOSPITAL ENCOUNTER (OUTPATIENT)
Dept: CARDIOLOGY | Facility: HOSPITAL | Age: 83
Discharge: HOME | End: 2023-12-05
Payer: COMMERCIAL

## 2023-12-05 LAB
ALBUMIN SERPL BCP-MCNC: 3.1 G/DL (ref 3.4–5)
ALBUMIN SERPL BCP-MCNC: 3.2 G/DL (ref 3.4–5)
ALBUMIN SERPL BCP-MCNC: 3.3 G/DL (ref 3.4–5)
ANION GAP BLDA CALCULATED.4IONS-SCNC: 9 MMO/L (ref 10–25)
ANION GAP SERPL CALC-SCNC: 13 MMOL/L (ref 10–20)
ANION GAP SERPL CALC-SCNC: 13 MMOL/L (ref 10–20)
ANION GAP SERPL CALC-SCNC: 16 MMOL/L (ref 10–20)
AORTIC VALVE PEAK VELOCITY: 1.16
ATRIAL RATE: 48 BPM
AV PEAK GRADIENT: 5.4
AVA (PEAK VEL): 2.94
BASE EXCESS BLDA CALC-SCNC: 0 MMOL/L (ref -2–3)
BASOPHILS # BLD MANUAL: 0 X10*3/UL (ref 0–0.1)
BASOPHILS NFR BLD MANUAL: 0 %
BODY TEMPERATURE: 37 DEGREES CELSIUS
BUN SERPL-MCNC: 52 MG/DL (ref 6–23)
BUN SERPL-MCNC: 59 MG/DL (ref 6–23)
BUN SERPL-MCNC: 67 MG/DL (ref 6–23)
CA-I BLDA-SCNC: 1.27 MMOL/L (ref 1.1–1.33)
CALCIUM SERPL-MCNC: 8.5 MG/DL (ref 8.6–10.6)
CALCIUM SERPL-MCNC: 8.6 MG/DL (ref 8.6–10.6)
CALCIUM SERPL-MCNC: 8.8 MG/DL (ref 8.6–10.6)
CHLORIDE BLDA-SCNC: 127 MMOL/L (ref 98–107)
CHLORIDE SERPL-SCNC: 124 MMOL/L (ref 98–107)
CHLORIDE SERPL-SCNC: 125 MMOL/L (ref 98–107)
CHLORIDE SERPL-SCNC: 128 MMOL/L (ref 98–107)
CO2 SERPL-SCNC: 21 MMOL/L (ref 21–32)
CO2 SERPL-SCNC: 22 MMOL/L (ref 21–32)
CO2 SERPL-SCNC: 24 MMOL/L (ref 21–32)
CREAT SERPL-MCNC: 1.23 MG/DL (ref 0.5–1.05)
CREAT SERPL-MCNC: 1.32 MG/DL (ref 0.5–1.05)
CREAT SERPL-MCNC: 1.4 MG/DL (ref 0.5–1.05)
EJECTION FRACTION APICAL 4 CHAMBER: 75.1
EJECTION FRACTION: 74
EOSINOPHIL # BLD MANUAL: 0 X10*3/UL (ref 0–0.4)
EOSINOPHIL NFR BLD MANUAL: 0 %
ERYTHROCYTE [DISTWIDTH] IN BLOOD BY AUTOMATED COUNT: 14.6 % (ref 11.5–14.5)
GFR SERPL CREATININE-BSD FRML MDRD: 37 ML/MIN/1.73M*2
GFR SERPL CREATININE-BSD FRML MDRD: 40 ML/MIN/1.73M*2
GFR SERPL CREATININE-BSD FRML MDRD: 43 ML/MIN/1.73M*2
GLUCOSE BLD MANUAL STRIP-MCNC: 116 MG/DL (ref 74–99)
GLUCOSE BLD MANUAL STRIP-MCNC: 129 MG/DL (ref 74–99)
GLUCOSE BLD MANUAL STRIP-MCNC: 134 MG/DL (ref 74–99)
GLUCOSE BLD MANUAL STRIP-MCNC: 140 MG/DL (ref 74–99)
GLUCOSE BLD MANUAL STRIP-MCNC: 140 MG/DL (ref 74–99)
GLUCOSE BLD MANUAL STRIP-MCNC: 146 MG/DL (ref 74–99)
GLUCOSE BLD MANUAL STRIP-MCNC: 179 MG/DL (ref 74–99)
GLUCOSE BLD MANUAL STRIP-MCNC: 183 MG/DL (ref 74–99)
GLUCOSE BLD MANUAL STRIP-MCNC: 20 MG/DL (ref 74–99)
GLUCOSE BLD MANUAL STRIP-MCNC: 208 MG/DL (ref 74–99)
GLUCOSE BLD MANUAL STRIP-MCNC: 41 MG/DL (ref 74–99)
GLUCOSE BLD MANUAL STRIP-MCNC: 99 MG/DL (ref 74–99)
GLUCOSE BLDA-MCNC: 25 MG/DL (ref 74–99)
GLUCOSE SERPL-MCNC: 117 MG/DL (ref 74–99)
GLUCOSE SERPL-MCNC: 169 MG/DL (ref 74–99)
GLUCOSE SERPL-MCNC: 51 MG/DL (ref 74–99)
HCO3 BLDA-SCNC: 24.8 MMOL/L (ref 22–26)
HCT VFR BLD AUTO: 30.7 % (ref 36–46)
HCT VFR BLD EST: 30 % (ref 36–46)
HGB BLD-MCNC: 9.5 G/DL (ref 12–16)
HGB BLDA-MCNC: 10 G/DL (ref 12–16)
IMM GRANULOCYTES # BLD AUTO: 0.08 X10*3/UL (ref 0–0.5)
IMM GRANULOCYTES NFR BLD AUTO: 0.6 % (ref 0–0.9)
INHALED O2 CONCENTRATION: 21 %
LACTATE BLDA-SCNC: 1.3 MMOL/L (ref 0.4–2)
LEFT VENTRICLE INTERNAL DIMENSION DIASTOLE: 3.3 (ref 3.5–6)
LEFT VENTRICULAR OUTFLOW TRACT DIAMETER: 2.04
LYMPHOCYTES # BLD MANUAL: 0.35 X10*3/UL (ref 0.8–3)
LYMPHOCYTES NFR BLD MANUAL: 2.6 %
MAGNESIUM SERPL-MCNC: 2.14 MG/DL (ref 1.6–2.4)
MCH RBC QN AUTO: 30.9 PG (ref 26–34)
MCHC RBC AUTO-ENTMCNC: 30.9 G/DL (ref 32–36)
MCV RBC AUTO: 100 FL (ref 80–100)
MITRAL VALVE E/A RATIO: 1.99
MITRAL VALVE E/E' RATIO: 11.46
MONOCYTES # BLD MANUAL: 0.12 X10*3/UL (ref 0.05–0.8)
MONOCYTES NFR BLD MANUAL: 0.9 %
NEUTS SEG # BLD MANUAL: 13.03 X10*3/UL (ref 1.6–5)
NEUTS SEG NFR BLD MANUAL: 96.5 %
NRBC BLD-RTO: 0 /100 WBCS (ref 0–0)
OXYHGB MFR BLDA: 95.4 % (ref 94–98)
P AXIS: 81 DEGREES
P OFFSET: 115 MS
P ONSET: 82 MS
PCO2 BLDA: 40 MM HG (ref 38–42)
PH BLDA: 7.4 PH (ref 7.38–7.42)
PHOSPHATE SERPL-MCNC: 2.3 MG/DL (ref 2.5–4.9)
PHOSPHATE SERPL-MCNC: 2.5 MG/DL (ref 2.5–4.9)
PHOSPHATE SERPL-MCNC: 3.3 MG/DL (ref 2.5–4.9)
PLATELET # BLD AUTO: 88 X10*3/UL (ref 150–450)
PO2 BLDA: 84 MM HG (ref 85–95)
POTASSIUM BLDA-SCNC: 3.7 MMOL/L (ref 3.5–5.3)
POTASSIUM SERPL-SCNC: 3.7 MMOL/L (ref 3.5–5.3)
POTASSIUM SERPL-SCNC: 3.8 MMOL/L (ref 3.5–5.3)
POTASSIUM SERPL-SCNC: 4 MMOL/L (ref 3.5–5.3)
PR INTERVAL: 288 MS
Q ONSET: 226 MS
QRS COUNT: 7 BEATS
QRS DURATION: 102 MS
QT INTERVAL: 568 MS
QTC CALCULATION(BAZETT): 507 MS
QTC FREDERICIA: 527 MS
R AXIS: -59 DEGREES
RBC # BLD AUTO: 3.07 X10*6/UL (ref 4–5.2)
RBC MORPH BLD: ABNORMAL
RIGHT VENTRICLE FREE WALL PEAK S': 13
RIGHT VENTRICLE PEAK SYSTOLIC PRESSURE: 33.8
SAO2 % BLDA: 98 % (ref 94–100)
SODIUM BLDA-SCNC: 157 MMOL/L (ref 136–145)
SODIUM SERPL-SCNC: 157 MMOL/L (ref 136–145)
SODIUM SERPL-SCNC: 158 MMOL/L (ref 136–145)
SODIUM SERPL-SCNC: 159 MMOL/L (ref 136–145)
STAPHYLOCOCCUS SPEC CULT: ABNORMAL
T AXIS: 239 DEGREES
T OFFSET: 510 MS
TOTAL CELLS COUNTED BLD: 115
VANCOMYCIN SERPL-MCNC: 10.8 UG/ML (ref 5–20)
VENTRICULAR RATE: 48 BPM
WBC # BLD AUTO: 13.5 X10*3/UL (ref 4.4–11.3)

## 2023-12-05 PROCEDURE — 2500000001 HC RX 250 WO HCPCS SELF ADMINISTERED DRUGS (ALT 637 FOR MEDICARE OP)

## 2023-12-05 PROCEDURE — 82947 ASSAY GLUCOSE BLOOD QUANT: CPT

## 2023-12-05 PROCEDURE — 37799 UNLISTED PX VASCULAR SURGERY: CPT

## 2023-12-05 PROCEDURE — 2500000004 HC RX 250 GENERAL PHARMACY W/ HCPCS (ALT 636 FOR OP/ED): Performed by: STUDENT IN AN ORGANIZED HEALTH CARE EDUCATION/TRAINING PROGRAM

## 2023-12-05 PROCEDURE — 82435 ASSAY OF BLOOD CHLORIDE: CPT | Performed by: STUDENT IN AN ORGANIZED HEALTH CARE EDUCATION/TRAINING PROGRAM

## 2023-12-05 PROCEDURE — 2500000005 HC RX 250 GENERAL PHARMACY W/O HCPCS: Performed by: STUDENT IN AN ORGANIZED HEALTH CARE EDUCATION/TRAINING PROGRAM

## 2023-12-05 PROCEDURE — 2500000004 HC RX 250 GENERAL PHARMACY W/ HCPCS (ALT 636 FOR OP/ED)

## 2023-12-05 PROCEDURE — 93005 ELECTROCARDIOGRAM TRACING: CPT

## 2023-12-05 PROCEDURE — 94762 N-INVAS EAR/PLS OXIMTRY CONT: CPT

## 2023-12-05 PROCEDURE — 2020000001 HC ICU ROOM DAILY

## 2023-12-05 PROCEDURE — 82947 ASSAY GLUCOSE BLOOD QUANT: CPT | Performed by: STUDENT IN AN ORGANIZED HEALTH CARE EDUCATION/TRAINING PROGRAM

## 2023-12-05 PROCEDURE — 84295 ASSAY OF SERUM SODIUM: CPT

## 2023-12-05 PROCEDURE — 80202 ASSAY OF VANCOMYCIN: CPT | Performed by: STUDENT IN AN ORGANIZED HEALTH CARE EDUCATION/TRAINING PROGRAM

## 2023-12-05 PROCEDURE — 85027 COMPLETE CBC AUTOMATED: CPT

## 2023-12-05 PROCEDURE — 2500000005 HC RX 250 GENERAL PHARMACY W/O HCPCS

## 2023-12-05 PROCEDURE — 93306 TTE W/DOPPLER COMPLETE: CPT | Performed by: INTERNAL MEDICINE

## 2023-12-05 PROCEDURE — 93306 TTE W/DOPPLER COMPLETE: CPT

## 2023-12-05 PROCEDURE — 99291 CRITICAL CARE FIRST HOUR: CPT

## 2023-12-05 PROCEDURE — 85007 BL SMEAR W/DIFF WBC COUNT: CPT

## 2023-12-05 PROCEDURE — 83735 ASSAY OF MAGNESIUM: CPT

## 2023-12-05 PROCEDURE — 80069 RENAL FUNCTION PANEL: CPT | Performed by: STUDENT IN AN ORGANIZED HEALTH CARE EDUCATION/TRAINING PROGRAM

## 2023-12-05 RX ORDER — DEXTROSE MONOHYDRATE 100 MG/ML
0.3 INJECTION, SOLUTION INTRAVENOUS ONCE AS NEEDED
Status: DISCONTINUED | OUTPATIENT
Start: 2023-12-05 | End: 2023-12-05

## 2023-12-05 RX ORDER — MUPIROCIN 20 MG/G
OINTMENT TOPICAL 2 TIMES DAILY
Status: DISCONTINUED | OUTPATIENT
Start: 2023-12-05 | End: 2023-12-06

## 2023-12-05 RX ORDER — DEXTROSE MONOHYDRATE 100 MG/ML
0.3 INJECTION, SOLUTION INTRAVENOUS CONTINUOUS
Status: DISCONTINUED | OUTPATIENT
Start: 2023-12-05 | End: 2023-12-05

## 2023-12-05 RX ORDER — DEXTROSE MONOHYDRATE 100 MG/ML
50 INJECTION, SOLUTION INTRAVENOUS CONTINUOUS
Status: DISCONTINUED | OUTPATIENT
Start: 2023-12-05 | End: 2023-12-05

## 2023-12-05 RX ORDER — DEXTROSE MONOHYDRATE 50 MG/ML
100 INJECTION, SOLUTION INTRAVENOUS CONTINUOUS
Status: DISCONTINUED | OUTPATIENT
Start: 2023-12-05 | End: 2023-12-06

## 2023-12-05 RX ORDER — VANCOMYCIN HYDROCHLORIDE 1 G/200ML
1000 INJECTION, SOLUTION INTRAVENOUS ONCE
Status: COMPLETED | OUTPATIENT
Start: 2023-12-05 | End: 2023-12-05

## 2023-12-05 RX ORDER — DEXTROSE MONOHYDRATE AND SODIUM CHLORIDE 5; .9 G/100ML; G/100ML
100 INJECTION, SOLUTION INTRAVENOUS CONTINUOUS
Status: DISCONTINUED | OUTPATIENT
Start: 2023-12-05 | End: 2023-12-05

## 2023-12-05 RX ADMIN — PIPERACILLIN SODIUM AND TAZOBACTAM SODIUM 2.25 G: 2; .25 INJECTION, SOLUTION INTRAVENOUS at 20:36

## 2023-12-05 RX ADMIN — DEXTROSE MONOHYDRATE 25 G: 25 INJECTION, SOLUTION INTRAVENOUS at 04:15

## 2023-12-05 RX ADMIN — PIPERACILLIN SODIUM AND TAZOBACTAM SODIUM 2.25 G: 2; .25 INJECTION, SOLUTION INTRAVENOUS at 08:55

## 2023-12-05 RX ADMIN — DEXTROSE MONOHYDRATE 75 ML/HR: 50 INJECTION, SOLUTION INTRAVENOUS at 09:42

## 2023-12-05 RX ADMIN — SODIUM CHLORIDE 500 ML: 9 INJECTION, SOLUTION INTRAVENOUS at 02:29

## 2023-12-05 RX ADMIN — PIPERACILLIN SODIUM AND TAZOBACTAM SODIUM 2.25 G: 2; .25 INJECTION, SOLUTION INTRAVENOUS at 14:40

## 2023-12-05 RX ADMIN — PIPERACILLIN SODIUM AND TAZOBACTAM SODIUM 2.25 G: 2; .25 INJECTION, SOLUTION INTRAVENOUS at 03:59

## 2023-12-05 RX ADMIN — DOPAMINE HYDROCHLORIDE 5 MCG/KG/MIN: 160 INJECTION, SOLUTION INTRAVENOUS at 20:37

## 2023-12-05 RX ADMIN — HYDROCORTISONE SODIUM SUCCINATE 50 MG: 100 INJECTION, POWDER, FOR SOLUTION INTRAMUSCULAR; INTRAVENOUS at 23:42

## 2023-12-05 RX ADMIN — DEXTROSE MONOHYDRATE 25 G: 25 INJECTION, SOLUTION INTRAVENOUS at 02:11

## 2023-12-05 RX ADMIN — HYDROCORTISONE SODIUM SUCCINATE 50 MG: 100 INJECTION, POWDER, FOR SOLUTION INTRAMUSCULAR; INTRAVENOUS at 05:29

## 2023-12-05 RX ADMIN — HYDROCORTISONE SODIUM SUCCINATE 50 MG: 100 INJECTION, POWDER, FOR SOLUTION INTRAMUSCULAR; INTRAVENOUS at 16:51

## 2023-12-05 RX ADMIN — MUPIROCIN: 20 OINTMENT TOPICAL at 23:42

## 2023-12-05 RX ADMIN — VANCOMYCIN HYDROCHLORIDE 1000 MG: 1 INJECTION, SOLUTION INTRAVENOUS at 18:12

## 2023-12-05 RX ADMIN — DEXTROSE MONOHYDRATE 0.3 G/KG/HR: 100 INJECTION, SOLUTION INTRAVENOUS at 04:15

## 2023-12-05 RX ADMIN — DEXTROSE AND SODIUM CHLORIDE 100 ML/HR: 5; 900 INJECTION, SOLUTION INTRAVENOUS at 06:59

## 2023-12-05 RX ADMIN — DEXTROSE MONOHYDRATE 100 ML/HR: 50 INJECTION, SOLUTION INTRAVENOUS at 20:36

## 2023-12-05 RX ADMIN — DEXTROSE MONOHYDRATE 0.3 G/KG/HR: 100 INJECTION, SOLUTION INTRAVENOUS at 02:16

## 2023-12-05 RX ADMIN — POTASSIUM PHOSPHATE, MONOBASIC POTASSIUM PHOSPHATE, DIBASIC 15 MMOL: 224; 236 INJECTION, SOLUTION, CONCENTRATE INTRAVENOUS at 08:53

## 2023-12-05 RX ADMIN — HYDROCORTISONE SODIUM SUCCINATE 50 MG: 100 INJECTION, POWDER, FOR SOLUTION INTRAMUSCULAR; INTRAVENOUS at 10:18

## 2023-12-05 RX ADMIN — DEXTROSE MONOHYDRATE 50 ML/HR: 100 INJECTION, SOLUTION INTRAVENOUS at 04:52

## 2023-12-05 ASSESSMENT — PAIN - FUNCTIONAL ASSESSMENT
PAIN_FUNCTIONAL_ASSESSMENT: CPOT (CRITICAL CARE PAIN OBSERVATION TOOL)
PAIN_FUNCTIONAL_ASSESSMENT: CPOT (CRITICAL CARE PAIN OBSERVATION TOOL)
PAIN_FUNCTIONAL_ASSESSMENT: 0-10
PAIN_FUNCTIONAL_ASSESSMENT: CPOT (CRITICAL CARE PAIN OBSERVATION TOOL)
PAIN_FUNCTIONAL_ASSESSMENT: CPOT (CRITICAL CARE PAIN OBSERVATION TOOL)

## 2023-12-05 ASSESSMENT — PAIN SCALES - GENERAL
PAINLEVEL_OUTOF10: 0 - NO PAIN

## 2023-12-05 NOTE — ED PROCEDURE NOTE
Procedure  Critical Care    Performed by: Aline Leija DO  Authorized by: Aline Leija DO    Critical care provider statement:     Critical care time (minutes):  40    Critical care time was exclusive of:  Separately billable procedures and treating other patients    Critical care was necessary to treat or prevent imminent or life-threatening deterioration of the following conditions:  Sepsis    Critical care was time spent personally by me on the following activities:  Discussions with consultants, discussions with primary provider, evaluation of patient's response to treatment, examination of patient, review of old charts, pulse oximetry, ordering and review of radiographic studies, ordering and review of laboratory studies and ordering and performing treatments and interventions    I assumed direction of critical care for this patient from another provider in my specialty: yes      Care discussed with: admitting provider                 Aline Leija DO  12/04/23 2002

## 2023-12-05 NOTE — ED PROCEDURE NOTE
Procedure  Critical Care    Performed by: LILIANA Mcleod  Authorized by: Aline Leija DO    Critical care provider statement:     Critical care time (minutes): 75 minutes.    Critical care time was exclusive of:  Separately billable procedures and treating other patients    Critical care was necessary to treat or prevent imminent or life-threatening deterioration of the following conditions:  Sepsis, shock, metabolic crisis and dehydration    Critical care was time spent personally by me on the following activities:  Ordering and performing treatments and interventions, ordering and review of laboratory studies, ordering and review of radiographic studies, pulse oximetry, re-evaluation of patient's condition, review of old charts, obtaining history from patient or surrogate, interpretation of cardiac output measurements, examination of patient, evaluation of patient's response to treatment, discussions with consultants and development of treatment plan with patient or surrogate    Care discussed with: accepting provider at another facility               LILIANA Mcleod  12/05/23 [id number]

## 2023-12-05 NOTE — CARE PLAN
The patient's goals for the shift include    Problem: Pain - Adult  Goal: Verbalizes/displays adequate comfort level or baseline comfort level  Outcome: Progressing     Problem: Safety - Adult  Goal: Free from fall injury  Outcome: Progressing     Problem: Discharge Planning  Goal: Discharge to home or other facility with appropriate resources  Outcome: Progressing     Problem: Chronic Conditions and Co-morbidities  Goal: Patient's chronic conditions and co-morbidity symptoms are monitored and maintained or improved  Outcome: Progressing     Problem: Safety - Medical Restraint  Goal: Remains free of injury from restraints (Restraint for Interference with Medical Device)  Outcome: Progressing  Goal: Free from restraint(s) (Restraint for Interference with Medical Device)  Outcome: Progressing     Problem: Skin  Goal: Decreased wound size/increased tissue granulation at next dressing change  Outcome: Progressing  Goal: Participates in plan/prevention/treatment measures  Outcome: Progressing  Goal: Prevent/manage excess moisture  Outcome: Progressing  Goal: Prevent/minimize sheer/friction injuries  Outcome: Progressing  Goal: Promote/optimize nutrition  Outcome: Progressing  Goal: Promote skin healing  Outcome: Progressing       The clinical goals for the shift include stabilize pt's blood pressure

## 2023-12-05 NOTE — ED PROCEDURE NOTE
Procedure  Central Line    Performed by: Aline Leija DO  Authorized by: Aline Leija DO    Consent:     Consent obtained:  Emergent situation    Consent given by:  Guardian    Alternatives discussed:  Delayed treatment  Universal protocol:     Procedure explained and questions answered to patient or proxy's satisfaction: yes      Relevant documents present and verified: yes      Test results available: yes      Imaging studies available: yes      Required blood products, implants, devices, and special equipment available: yes      Site/side marked: yes      Immediately prior to procedure, a time out was called: yes      Patient identity confirmed:  Arm band  Pre-procedure details:     Indication(s): central venous access and hemodynamic monitoring      Hand hygiene: Hand hygiene performed prior to insertion      Sterile barrier technique: All elements of maximal sterile technique followed      Skin preparation:  Chlorhexidine  Sedation:     Sedation type:  None  Anesthesia:     Anesthesia method:  None  Procedure details:     Location:  R femoral    Site selection rationale:  Pt demented, pulling at everything,    Procedural supplies:  Triple lumen    Catheter size:  7 Fr    Ultrasound guidance: no      Number of attempts:  1    Successful placement: yes    Post-procedure details:     Post-procedure:  Dressing applied and line sutured    Assessment:  Blood return through all ports and free fluid flow    Procedure completion:  Tolerated               Aline Leija DO  12/04/23 2004

## 2023-12-05 NOTE — PROGRESS NOTES
"Vancomycin Dosing by Pharmacy- FOLLOW UP    Nay Frost is a 83 y.o. year old female who Pharmacy has been consulted for vancomycin dosing for pneumonia. Based on the patient's indication and renal status this patient is being dosed based on a goal trough/random level of 15-20.     Renal function is currently improving.      Most recent trough level: 10.8 mcg/mL    Visit Vitals  /72   Pulse (!) 41   Temp 35.3 °C (95.5 °F)   Resp 19        Lab Results   Component Value Date    CREATININE 1.32 (H) 12/05/2023    CREATININE 1.23 (H) 12/05/2023    CREATININE 1.40 (H) 12/04/2023    CREATININE 1.46 (H) 12/04/2023        Patient weight is No results found for: \"PTWEIGHT\"    No results found for: \"CULTURE\"     I/O last 3 completed shifts:  In: 6722.5 (130.5 mL/kg) [I.V.:1572.5 (30.5 mL/kg); Blood:500; IV Piggyback:4650]  Out: 2115 (41.1 mL/kg) [Urine:2115 (1.1 mL/kg/hr)]  Dosing Weight: 51.5 kg   [unfilled]    Lab Results   Component Value Date    PATIENTTEMP 37.0 12/05/2023    PATIENTTEMP 37.0 12/04/2023    PATIENTTEMP 37.0 12/04/2023        Assessment/Plan    Vancomycin trough below goal. Will redose with 1000mg once on 12/5/23 at 1800.     The next level will be obtained on 12/6/23 at 1700. May be obtained sooner if clinically indicated.   Will continue to monitor renal function daily while on vancomycin and order serum creatinine at least every 48 hours if not already ordered.  Follow for continued vancomycin needs, clinical response, and signs/symptoms of toxicity.       Kiana Peralta, PharmD           "

## 2023-12-05 NOTE — PROGRESS NOTES
SOCIAL WORK NOTE   Hospice order, VM left for daughter. Social work to follow.  UPDATE: SW spoke with daughter at bedside. Agreeable to [facility] will need [facility] vs HH. Referral placed   UPDATE: Appt is scheduled for tomorrow Wed 12/6 btw 1:30-2 with ABELARDO Beasley, BRIANNAW-S ([id number])

## 2023-12-05 NOTE — PROGRESS NOTES
Nay Frost is a 83 y.o. female on day 2 of admission presenting with Septic shock (CMS/HCC).    Subjective   Overnight, blood glucose elevated in 200s-300s. Started on SSI and given 4 units lispro around 6 PM, and additional 1 unit around 11 PM. Around midnight, had blood glucose reading of 20. Given an amp of D50 and started on D10W, transitioned to D5WNS. Repeat glucose this AM trending in 110s-180s.    Epinephrine drip discontinued in setting of rising lactate. Temporarily required vasopressin overnight with levophed and dopamine gtts. Currently receiving Levo at 0.03 mcg/kg/min and dopamine at 4 mcg/kg/min.    Will open eyes to voice and moves limbs spontaneously but non-verbal this AM. Becomes restless when awoken.    Objective     Physical Exam  Constitutional:       Comments: Sleeping comfortably, in no acute distress. Opens eyes to voice. Becomes figidity/restless when awoken   HENT:      Head: Normocephalic and atraumatic.      Nose: Nose normal.      Mouth/Throat:      Mouth: Mucous membranes are dry.      Pharynx: No oropharyngeal exudate or posterior oropharyngeal erythema.   Eyes:      General: No scleral icterus.     Extraocular Movements: Extraocular movements intact.      Pupils: Pupils are equal, round, and reactive to light.   Cardiovascular:      Rate and Rhythm: Regular rhythm. Bradycardia present.      Pulses: Normal pulses.      Heart sounds: Normal heart sounds.   Pulmonary:      Effort: Pulmonary effort is normal. No respiratory distress.      Breath sounds: Normal breath sounds.   Abdominal:      General: Abdomen is flat. There is no distension.      Palpations: Abdomen is soft.      Tenderness: There is no abdominal tenderness.   Musculoskeletal:         General: Normal range of motion.      Cervical back: Normal range of motion and neck supple.      Right lower leg: No edema.      Left lower leg: No edema.   Skin:     General: Skin is warm and dry.   Neurological:      Comments:  "Nonverbal, opens eyes to voice    Psychiatric:      Comments: At basline       Last Recorded Vitals  Blood pressure 134/72, pulse (!) 38, temperature 36.4 °C (97.5 °F), resp. rate 13, height 1.6 m (5' 3\"), weight 51.5 kg (113 lb 8.6 oz), SpO2 97 %.  Intake/Output last 3 Shifts:  I/O last 3 completed shifts:  In: 6722.5 (130.5 mL/kg) [I.V.:1572.5 (30.5 mL/kg); Blood:500; IV Piggyback:4650]  Out: 2115 (41.1 mL/kg) [Urine:2115 (1.1 mL/kg/hr)]  Dosing Weight: 51.5 kg     Scheduled medications  hydrocortisone sodium succinate, 50 mg, intravenous, q6h  piperacillin-tazobactam, 2.25 g, intravenous, q6h  potassium phosphate, 15 mmol, intravenous, Once      Continuous medications  D5 % and 0.9 % sodium chloride, 100 mL/hr, Last Rate: 100 mL/hr (12/05/23 0659)  dextrose 10 % in water (D10W), 50 mL/hr, Last Rate: Stopped (12/05/23 0701)  DOPamine, 1-10 mcg/kg/min (Dosing Weight), Last Rate: 4 mcg/kg/min (12/05/23 0400)  norepinephrine, 0-3.3 mcg/kg/min, Last Rate: 0.03 mcg/kg/min (12/05/23 0600)  vasopressin, 0.01-0.03 Units/min, Last Rate: 0.03 Units/min (12/04/23 2206)      PRN medications  PRN medications: dextrose, glucagon, oxygen, vancomycin      Relevant Results  Results for orders placed or performed during the hospital encounter of 12/03/23 (from the past 24 hour(s))   Electrocardiogram, 12-lead PRN ACS symptoms   Result Value Ref Range    Ventricular Rate 48 BPM    Atrial Rate 48 BPM    NH Interval 288 ms    QRS Duration 102 ms    QT Interval 568 ms    QTC Calculation(Bazett) 507 ms    P Axis 81 degrees    R Axis -59 degrees    T Axis 239 degrees    QRS Count 7 beats    Q Onset 226 ms    P Onset 82 ms    P Offset 115 ms    T Offset 510 ms    QTC Fredericia 527 ms   Electrocardiogram, 12-lead PRN ACS symptoms   Result Value Ref Range    Ventricular Rate 74 BPM    Atrial Rate 74 BPM    NH Interval 232 ms    QRS Duration 100 ms    QT Interval 434 ms    QTC Calculation(Bazett) 481 ms    P Axis 63 degrees    R Axis -69 " degrees    T [place] 196 degrees    QRS Count 13 beats    Q Onset 228 ms    P Onset 112 ms    P Offset 160 ms    T Offset 445 ms    QTC Fredericia 465 ms   Blood Gas Arterial Full Panel   Result Value Ref Range    POCT pH, Arterial 7.33 (L) 7.38 - 7.42 pH    POCT pCO2, Arterial 48 (H) 38 - 42 mm Hg    POCT pO2, Arterial 141 (H) 85 - 95 mm Hg    POCT SO2, Arterial 100 94 - 100 %    POCT Oxy Hemoglobin, Arterial 97.0 94.0 - 98.0 %    POCT Hematocrit Calculated, Arterial 33.0 (L) 36.0 - 46.0 %    POCT Sodium, Arterial 161 (HH) 136 - 145 mmol/L    POCT Potassium, Arterial 3.7 3.5 - 5.3 mmol/L    POCT Chloride, Arterial 129 (H) 98 - 107 mmol/L    POCT Ionized Calcium, Arterial 1.22 1.10 - 1.33 mmol/L    POCT Glucose, Arterial 201 (H) 74 - 99 mg/dL    POCT Lactate, Arterial 0.9 0.4 - 2.0 mmol/L    POCT Base Excess, Arterial -1.0 -2.0 - 3.0 mmol/L    POCT HCO3 Calculated, Arterial 25.3 22.0 - 26.0 mmol/L    POCT Hemoglobin, Arterial 10.9 (L) 12.0 - 16.0 g/dL    POCT Anion Gap, Arterial 10 10 - 25 mmo/L    Patient Temperature 37.0 degrees Celsius    FiO2 35 %   Vancomycin, Trough   Result Value Ref Range    Vancomycin, Trough 8.7 5.0 - 20.0 ug/mL   Procalcitonin   Result Value Ref Range    Procalcitonin 0.12 (H) <=0.07 ng/mL   Basic metabolic panel   Result Value Ref Range    Glucose 248 (H) 74 - 99 mg/dL    Sodium 162 (HH) 136 - 145 mmol/L    Potassium 3.1 (L) 3.5 - 5.3 mmol/L    Chloride 127 (H) 98 - 107 mmol/L    Bicarbonate 20 (L) 21 - 32 mmol/L    Anion Gap 18 10 - 20 mmol/L    Urea Nitrogen 74 (H) 6 - 23 mg/dL    Creatinine 1.49 (H) 0.50 - 1.05 mg/dL    eGFR 34 (L) >60 mL/min/1.73m*2    Calcium 8.4 (L) 8.6 - 10.6 mg/dL   Blood Gas Arterial Full Panel   Result Value Ref Range    POCT pH, Arterial 7.30 (L) 7.38 - 7.42 pH    POCT pCO2, Arterial 40 38 - 42 mm Hg    POCT pO2, Arterial 136 (H) 85 - 95 mm Hg    POCT SO2, Arterial 100 94 - 100 %    POCT Oxy Hemoglobin, Arterial 97.0 94.0 - 98.0 %    POCT Hematocrit Calculated,  Arterial 33.0 (L) 36.0 - 46.0 %    POCT Sodium, Arterial 154 (H) 136 - 145 mmol/L    POCT Potassium, Arterial 3.6 3.5 - 5.3 mmol/L    POCT Chloride, Arterial 122 (H) 98 - 107 mmol/L    POCT Ionized Calcium, Arterial 1.21 1.10 - 1.33 mmol/L    POCT Glucose, Arterial 516 (HH) 74 - 99 mg/dL    POCT Lactate, Arterial 5.1 (HH) 0.4 - 2.0 mmol/L    POCT Base Excess, Arterial -6.3 (L) -2.0 - 3.0 mmol/L    POCT HCO3 Calculated, Arterial 19.7 (L) 22.0 - 26.0 mmol/L    POCT Hemoglobin, Arterial 11.1 (L) 12.0 - 16.0 g/dL    POCT Anion Gap, Arterial 16 10 - 25 mmo/L    Patient Temperature 37.0 degrees Celsius    FiO2 35 %   POCT GLUCOSE   Result Value Ref Range    POCT Glucose 374 (H) 74 - 99 mg/dL   Renal Function Panel   Result Value Ref Range    Glucose 397 (H) 74 - 99 mg/dL    Sodium 158 (H) 136 - 145 mmol/L    Potassium 3.9 3.5 - 5.3 mmol/L    Chloride 122 (H) 98 - 107 mmol/L    Bicarbonate 19 (L) 21 - 32 mmol/L    Anion Gap 21 (H) 10 - 20 mmol/L    Urea Nitrogen 63 (H) 6 - 23 mg/dL    Creatinine 1.46 (H) 0.50 - 1.05 mg/dL    eGFR 35 (L) >60 mL/min/1.73m*2    Calcium 8.4 (L) 8.6 - 10.6 mg/dL    Phosphorus 2.3 (L) 2.5 - 4.9 mg/dL    Albumin 3.6 3.4 - 5.0 g/dL   POCT GLUCOSE   Result Value Ref Range    POCT Glucose 167 (H) 74 - 99 mg/dL   Renal function panel   Result Value Ref Range    Glucose 117 (H) 74 - 99 mg/dL    Sodium 159 (H) 136 - 145 mmol/L    Potassium 3.8 3.5 - 5.3 mmol/L    Chloride 125 (H) 98 - 107 mmol/L    Bicarbonate 22 21 - 32 mmol/L    Anion Gap 16 10 - 20 mmol/L    Urea Nitrogen 67 (H) 6 - 23 mg/dL    Creatinine 1.40 (H) 0.50 - 1.05 mg/dL    eGFR 37 (L) >60 mL/min/1.73m*2    Calcium 8.6 8.6 - 10.6 mg/dL    Phosphorus 2.5 2.5 - 4.9 mg/dL    Albumin 3.3 (L) 3.4 - 5.0 g/dL   Blood Gas Arterial Full Panel   Result Value Ref Range    POCT pH, Arterial 7.40 7.38 - 7.42 pH    POCT pCO2, Arterial 40 38 - 42 mm Hg    POCT pO2, Arterial 84 (L) 85 - 95 mm Hg    POCT SO2, Arterial 98 94 - 100 %    POCT Oxy Hemoglobin,  Arterial 95.4 94.0 - 98.0 %    POCT Hematocrit Calculated, Arterial 30.0 (L) 36.0 - 46.0 %    POCT Sodium, Arterial 157 (H) 136 - 145 mmol/L    POCT Potassium, Arterial 3.7 3.5 - 5.3 mmol/L    POCT Chloride, Arterial 127 (H) 98 - 107 mmol/L    POCT Ionized Calcium, Arterial 1.27 1.10 - 1.33 mmol/L    POCT Glucose, Arterial 25 (LL) 74 - 99 mg/dL    POCT Lactate, Arterial 1.3 0.4 - 2.0 mmol/L    POCT Base Excess, Arterial 0.0 -2.0 - 3.0 mmol/L    POCT HCO3 Calculated, Arterial 24.8 22.0 - 26.0 mmol/L    POCT Hemoglobin, Arterial 10.0 (L) 12.0 - 16.0 g/dL    POCT Anion Gap, Arterial 9 (L) 10 - 25 mmo/L    Patient Temperature 37.0 degrees Celsius    FiO2 21 %   POCT GLUCOSE   Result Value Ref Range    POCT Glucose 20 (L) 74 - 99 mg/dL   POCT GLUCOSE   Result Value Ref Range    POCT Glucose 140 (H) 74 - 99 mg/dL   POCT GLUCOSE   Result Value Ref Range    POCT Glucose 99 74 - 99 mg/dL   CBC and Auto Differential   Result Value Ref Range    WBC 13.5 (H) 4.4 - 11.3 x10*3/uL    nRBC 0.0 0.0 - 0.0 /100 WBCs    RBC 3.07 (L) 4.00 - 5.20 x10*6/uL    Hemoglobin 9.5 (L) 12.0 - 16.0 g/dL    Hematocrit 30.7 (L) 36.0 - 46.0 %     80 - 100 fL    MCH 30.9 26.0 - 34.0 pg    MCHC 30.9 (L) 32.0 - 36.0 g/dL    RDW 14.6 (H) 11.5 - 14.5 %    Platelets 88 (L) 150 - 450 x10*3/uL    Immature Granulocytes %, Automated 0.6 0.0 - 0.9 %    Immature Granulocytes Absolute, Automated 0.08 0.00 - 0.50 x10*3/uL   Magnesium   Result Value Ref Range    Magnesium 2.14 1.60 - 2.40 mg/dL   Renal Function Panel   Result Value Ref Range    Glucose 51 (LL) 74 - 99 mg/dL    Sodium 158 (H) 136 - 145 mmol/L    Potassium 3.7 3.5 - 5.3 mmol/L    Chloride 128 (H) 98 - 107 mmol/L    Bicarbonate 21 21 - 32 mmol/L    Anion Gap 13 10 - 20 mmol/L    Urea Nitrogen 59 (H) 6 - 23 mg/dL    Creatinine 1.23 (H) 0.50 - 1.05 mg/dL    eGFR 43 (L) >60 mL/min/1.73m*2    Calcium 8.5 (L) 8.6 - 10.6 mg/dL    Phosphorus 2.3 (L) 2.5 - 4.9 mg/dL    Albumin 3.2 (L) 3.4 - 5.0 g/dL    Manual Differential   Result Value Ref Range    Neutrophils %, Manual 96.5 40.0 - 80.0 %    Lymphocytes %, Manual 2.6 13.0 - 44.0 %    Monocytes %, Manual 0.9 2.0 - 10.0 %    Eosinophils %, Manual 0.0 0.0 - 6.0 %    Basophils %, Manual 0.0 0.0 - 2.0 %    Seg Neutrophils Absolute, Manual 13.03 (H) 1.60 - 5.00 x10*3/uL    Lymphocytes Absolute, Manual 0.35 (L) 0.80 - 3.00 x10*3/uL    Monocytes Absolute, Manual 0.12 0.05 - 0.80 x10*3/uL    Eosinophils Absolute, Manual 0.00 0.00 - 0.40 x10*3/uL    Basophils Absolute, Manual 0.00 0.00 - 0.10 x10*3/uL    Total Cells Counted 115     RBC Morphology No significant RBC morphology present    POCT GLUCOSE   Result Value Ref Range    POCT Glucose 41 (L) 74 - 99 mg/dL   POCT GLUCOSE   Result Value Ref Range    POCT Glucose 183 (H) 74 - 99 mg/dL   POCT GLUCOSE   Result Value Ref Range    POCT Glucose 134 (H) 74 - 99 mg/dL   POCT GLUCOSE   Result Value Ref Range    POCT Glucose 116 (H) 74 - 99 mg/dL     Imaging: no new imaging to review        This patient has a central line   Reason for the central line remaining today? Hemodynamic monitoring    This patient has a urinary catheter   Reason for the urinary catheter remaining today? critically ill patient who need accurate urinary output measurements      Malnutrition Diagnosis Status: New  Malnutrition Diagnosis: Severe malnutrition related to chronic disease or condition  As Evidenced by: pt with a 17% weight loss in about a year and a half along with severe muscle and fat loss on physical exam; suspect PO intake was inadequate PTA  I agree with the dietitian's malnutrition diagnosis.      Assessment/Plan   Principal Problem:    Septic shock (CMS/HCC)    Nay Frost is a 83 y.o. female with Alzheimer's disease (minimally conversant at baseline), Crohn's disease, and seizures who presents as a transfer from [facility] with presumed septic shock, with suspected soft tissue vs respiratory vs urinary source. ICU  course complicated by new-onset sinus bradycardia with 1st degree AV block requiring dopamine drip.     Blood sugar labile overnight, dropped to 20 after sliding scale lispro given. Glucose stabilized in 110s-130s following administration of dextrose drip. Patient remains NPO after failed bedside speech assessment yesterday.    Sodium improved to 158 from 169 on admission with hypotonic fluids. Patient does not currently appear volume overloaded but is net +4L for stay. For goal of Na of 148 over 24 hours, FDW 1.2L for goal sodium of 150.     Weaned off of Levo on rounds, remains on dopamine drip. HR trending in the low 60s, BP 110s-120s/40s. Appears to be in sinus bradycardia. Occasional abnormal rhythm noted on telemetry, however, this appears to correspond to when patient is agitated and moving around in bed.    Spoke with patient's daughter at bedside this morning. She is interested in speaking with hospice. Patient's son is flying in from out of town today, and family is considering transition to comfort care after he is able to be at bedside.    Updates 12/5:  -Will discontinue D5WNS and start D5W at 50 mL/hr to maintain blood glucose and continue to treat hyponatremia. Avoiding more voluminous fluids as patient net +4L since admission, appears fluid resuscitated at this point  -Speech recommending MBS, will hold for time being in setting of patient restlessness/discomfort and pending comfort care decision  -Hospice consult placed  -Will touch base with family at bedside this afternoon regarding hospice/comfort care discussion    NEURO  #AMS on Alzheimer's dementia  -likely multifactorial in setting of hypernatremia, septic shock, and uremia  -Appears to be close to mental status baseline (awake but minimally verbal, not oriented)  -holding home donepezil and trazodone in setting of NPO     PULM  #Concern for RUL/RLL pneumonia  -GGOs noted in RUL and RLL on CT chest, may represent HAP vs aspiration  pneumonia  -MRSA nares pending  -Abx regimen as per ID section below     CV  #Shock, most likely mixed septic and hypovolemic-improving  -Na elevated to 169 on admission, family concerned that patient has not been kept hydrated at nursing facility  -Levophed held this morning, patient currently maintaining MAPs off of pressors  -Holding on further fluid resuscitation given net +4L since admission  -Abx as per below       #Sinus bradycardia  #First degree AV block-resolved  -HR improved on dopamine drip, currently trending in 60s-70s  -May represent paradoxical response to sepsis vs SSS  -TSH WNL (2.25)  -Will continue dopamine drip for time being, pending family goals of care decision     FEN/GI  #Diet/enteral access  #Malnutrition  -NPO for AMS and critical illness  -Failed bedside swallow evaluation, holding off on MBS for time being pending goals of care decision  -Nutrition following, appreciate recs     RENAL/  #HI, likely pre-renal in setting of shock- resolving  -Cr 2.1 on admission, bl 1.0  -Cr improved to 1.23 after ongoing fluid resuscitation  -Will continue to monitor     #Hypernatremia, acute on chronic  -baseline 145-150, admission 169 (12/3 at 3 PM)  -Na improved to 158 from 164 yesterday morning   -FWD with goal Na of 150: 1.2L  -Will start D5W at 50 mL/hr, follow-up PM RFP     HEME/ONC  #Chronic anemia   #Chronic thrombocytopenia  -Hemoglobin baseline (11-12)  -Platelet baseline ()  -Unclear etiology of chronic thrombocytopenia, appears to have been managed conservatively/with observation in the past  -Hgb and platelet count both down-trending today, suspect possible dilutional component. No evidence of active bleeding.   -Will continue to monitor, maintain active type and screen     ID  #Septic shock   -Source(s): right perihilar pneumonia, UTI  -UCx pending  -Blood cx NGD1  -Continue vanc/zosyn  -MRSA nares pending  -Ideally would send respiratory cultures but patient unable to produce  sputum     F: D5Wat 50 mL/hr  E: PRN  N: NPO   A: R fem triple lumen CVC, PIV, left radial arterial line  O2: RA  Drips: dopamine 5 mcg/kg/min  Abx: vanc/zosyn  DVT PPx: SQH     CODE STATUS: DNAR/DNI, no dialysis, ok for one attempt at defib if reversible (confirmed with daughter on admission)  SURROGATE DECISION MAKER: Daughter Zeenat [phone] (cell)  Second call: Hector Aleabbie (son-in-law) [phone], home [phone], office [phone]       Margaux Dailey MD

## 2023-12-05 NOTE — PROGRESS NOTES
Spiritual Care Visit    Clinical Encounter Type  Visited With: Patient and family together  Routine Visit: Follow-up    Restoration Encounters  Restoration Needs: Prayer         Sacramental Encounters  Other Sacrament: P&B    Patient received a prayer and blessing by Fr. Bert Katz,  [employer] .  Daughter was present.   Patient had received the Sacrament of the Anointing of the Sick on 12/4/23.

## 2023-12-06 ENCOUNTER — HOSPITAL ENCOUNTER (INPATIENT)
Facility: HOSPITAL | Age: 83
LOS: 3 days | Discharge: HOSPICE | DRG: 871 | End: 2023-12-09
Attending: STUDENT IN AN ORGANIZED HEALTH CARE EDUCATION/TRAINING PROGRAM | Admitting: STUDENT IN AN ORGANIZED HEALTH CARE EDUCATION/TRAINING PROGRAM
Payer: COMMERCIAL

## 2023-12-06 VITALS
DIASTOLIC BLOOD PRESSURE: 72 MMHG | OXYGEN SATURATION: 99 % | RESPIRATION RATE: 13 BRPM | WEIGHT: 114.64 LBS | HEART RATE: 41 BPM | TEMPERATURE: 97 F | HEIGHT: 63 IN | BODY MASS INDEX: 20.31 KG/M2 | SYSTOLIC BLOOD PRESSURE: 134 MMHG

## 2023-12-06 DIAGNOSIS — R45.1 AGITATION: ICD-10-CM

## 2023-12-06 DIAGNOSIS — G89.29 OTHER CHRONIC PAIN: ICD-10-CM

## 2023-12-06 DIAGNOSIS — F03.90 DEMENTIA WITHOUT BEHAVIORAL DISTURBANCE (MULTI): ICD-10-CM

## 2023-12-06 DIAGNOSIS — A41.9 SEPSIS (MULTI): Primary | ICD-10-CM

## 2023-12-06 LAB
ALBUMIN SERPL BCP-MCNC: 2.7 G/DL (ref 3.4–5)
ALBUMIN SERPL BCP-MCNC: 3.1 G/DL (ref 3.4–5)
ALBUMIN SERPL BCP-MCNC: 3.2 G/DL (ref 3.4–5)
ANION GAP SERPL CALC-SCNC: 12 MMOL/L (ref 10–20)
ANION GAP SERPL CALC-SCNC: 13 MMOL/L (ref 10–20)
ANION GAP SERPL CALC-SCNC: 14 MMOL/L (ref 10–20)
BACTERIA UR CULT: ABNORMAL
BASOPHILS # BLD AUTO: 0.02 X10*3/UL (ref 0–0.1)
BASOPHILS NFR BLD AUTO: 0.2 %
BUN SERPL-MCNC: 39 MG/DL (ref 6–23)
BUN SERPL-MCNC: 41 MG/DL (ref 6–23)
BUN SERPL-MCNC: 47 MG/DL (ref 6–23)
CALCIUM SERPL-MCNC: 8.6 MG/DL (ref 8.6–10.6)
CALCIUM SERPL-MCNC: 8.9 MG/DL (ref 8.6–10.6)
CALCIUM SERPL-MCNC: 8.9 MG/DL (ref 8.6–10.6)
CHLORIDE SERPL-SCNC: 116 MMOL/L (ref 98–107)
CHLORIDE SERPL-SCNC: 118 MMOL/L (ref 98–107)
CHLORIDE SERPL-SCNC: 119 MMOL/L (ref 98–107)
CO2 SERPL-SCNC: 23 MMOL/L (ref 21–32)
CO2 SERPL-SCNC: 25 MMOL/L (ref 21–32)
CO2 SERPL-SCNC: 25 MMOL/L (ref 21–32)
CREAT SERPL-MCNC: 1.11 MG/DL (ref 0.5–1.05)
CREAT SERPL-MCNC: 1.13 MG/DL (ref 0.5–1.05)
CREAT SERPL-MCNC: 1.25 MG/DL (ref 0.5–1.05)
EOSINOPHIL # BLD AUTO: 0 X10*3/UL (ref 0–0.4)
EOSINOPHIL NFR BLD AUTO: 0 %
ERYTHROCYTE [DISTWIDTH] IN BLOOD BY AUTOMATED COUNT: 14.5 % (ref 11.5–14.5)
GFR SERPL CREATININE-BSD FRML MDRD: 42 ML/MIN/1.73M*2
GFR SERPL CREATININE-BSD FRML MDRD: 48 ML/MIN/1.73M*2
GFR SERPL CREATININE-BSD FRML MDRD: 49 ML/MIN/1.73M*2
GLUCOSE BLD MANUAL STRIP-MCNC: 141 MG/DL (ref 74–99)
GLUCOSE BLD MANUAL STRIP-MCNC: 159 MG/DL (ref 74–99)
GLUCOSE BLD MANUAL STRIP-MCNC: 161 MG/DL (ref 74–99)
GLUCOSE BLD MANUAL STRIP-MCNC: 199 MG/DL (ref 74–99)
GLUCOSE SERPL-MCNC: 162 MG/DL (ref 74–99)
GLUCOSE SERPL-MCNC: 194 MG/DL (ref 74–99)
GLUCOSE SERPL-MCNC: 211 MG/DL (ref 74–99)
HCT VFR BLD AUTO: 32.4 % (ref 36–46)
HGB BLD-MCNC: 9.7 G/DL (ref 12–16)
IMM GRANULOCYTES # BLD AUTO: 0.05 X10*3/UL (ref 0–0.5)
IMM GRANULOCYTES NFR BLD AUTO: 0.5 % (ref 0–0.9)
LYMPHOCYTES # BLD AUTO: 0.39 X10*3/UL (ref 0.8–3)
LYMPHOCYTES NFR BLD AUTO: 3.7 %
MAGNESIUM SERPL-MCNC: 1.96 MG/DL (ref 1.6–2.4)
MAGNESIUM SERPL-MCNC: 1.99 MG/DL (ref 1.6–2.4)
MCH RBC QN AUTO: 30.7 PG (ref 26–34)
MCHC RBC AUTO-ENTMCNC: 29.9 G/DL (ref 32–36)
MCV RBC AUTO: 103 FL (ref 80–100)
MONOCYTES # BLD AUTO: 0.19 X10*3/UL (ref 0.05–0.8)
MONOCYTES NFR BLD AUTO: 1.8 %
NEUTROPHILS # BLD AUTO: 9.8 X10*3/UL (ref 1.6–5.5)
NEUTROPHILS NFR BLD AUTO: 93.8 %
NRBC BLD-RTO: 0 /100 WBCS (ref 0–0)
PHOSPHATE SERPL-MCNC: 2.8 MG/DL (ref 2.5–4.9)
PHOSPHATE SERPL-MCNC: 3 MG/DL (ref 2.5–4.9)
PHOSPHATE SERPL-MCNC: 3.2 MG/DL (ref 2.5–4.9)
PLATELET # BLD AUTO: 70 X10*3/UL (ref 150–450)
POTASSIUM SERPL-SCNC: 3.4 MMOL/L (ref 3.5–5.3)
POTASSIUM SERPL-SCNC: 3.7 MMOL/L (ref 3.5–5.3)
POTASSIUM SERPL-SCNC: 3.8 MMOL/L (ref 3.5–5.3)
RBC # BLD AUTO: 3.16 X10*6/UL (ref 4–5.2)
SODIUM SERPL-SCNC: 150 MMOL/L (ref 136–145)
SODIUM SERPL-SCNC: 151 MMOL/L (ref 136–145)
SODIUM SERPL-SCNC: 153 MMOL/L (ref 136–145)
VANCOMYCIN SERPL-MCNC: 13.8 UG/ML (ref 5–20)
WBC # BLD AUTO: 10.5 X10*3/UL (ref 4.4–11.3)

## 2023-12-06 PROCEDURE — 85025 COMPLETE CBC W/AUTO DIFF WBC: CPT

## 2023-12-06 PROCEDURE — 1100000001 HC PRIVATE ROOM DAILY

## 2023-12-06 PROCEDURE — 83735 ASSAY OF MAGNESIUM: CPT | Performed by: STUDENT IN AN ORGANIZED HEALTH CARE EDUCATION/TRAINING PROGRAM

## 2023-12-06 PROCEDURE — 80069 RENAL FUNCTION PANEL: CPT | Performed by: STUDENT IN AN ORGANIZED HEALTH CARE EDUCATION/TRAINING PROGRAM

## 2023-12-06 PROCEDURE — 80069 RENAL FUNCTION PANEL: CPT

## 2023-12-06 PROCEDURE — 2500000004 HC RX 250 GENERAL PHARMACY W/ HCPCS (ALT 636 FOR OP/ED): Performed by: STUDENT IN AN ORGANIZED HEALTH CARE EDUCATION/TRAINING PROGRAM

## 2023-12-06 PROCEDURE — 37799 UNLISTED PX VASCULAR SURGERY: CPT

## 2023-12-06 PROCEDURE — 2500000004 HC RX 250 GENERAL PHARMACY W/ HCPCS (ALT 636 FOR OP/ED)

## 2023-12-06 PROCEDURE — 80202 ASSAY OF VANCOMYCIN: CPT

## 2023-12-06 PROCEDURE — 1150000001 HC HOSPICE PRIVATE ROOM DAILY

## 2023-12-06 PROCEDURE — 82947 ASSAY GLUCOSE BLOOD QUANT: CPT

## 2023-12-06 PROCEDURE — 99291 CRITICAL CARE FIRST HOUR: CPT

## 2023-12-06 PROCEDURE — 94762 N-INVAS EAR/PLS OXIMTRY CONT: CPT

## 2023-12-06 PROCEDURE — 83735 ASSAY OF MAGNESIUM: CPT

## 2023-12-06 RX ORDER — DOPAMINE HYDROCHLORIDE 160 MG/100ML
1-5 INJECTION, SOLUTION INTRAVENOUS CONTINUOUS
Status: DISCONTINUED | OUTPATIENT
Start: 2023-12-06 | End: 2023-12-06 | Stop reason: HOSPADM

## 2023-12-06 RX ORDER — HYDROMORPHONE HYDROCHLORIDE 1 MG/ML
0.2 INJECTION, SOLUTION INTRAMUSCULAR; INTRAVENOUS; SUBCUTANEOUS
Status: DISCONTINUED | OUTPATIENT
Start: 2023-12-06 | End: 2023-12-07

## 2023-12-06 RX ORDER — MAGNESIUM SULFATE HEPTAHYDRATE 40 MG/ML
2 INJECTION, SOLUTION INTRAVENOUS ONCE
Status: DISCONTINUED | OUTPATIENT
Start: 2023-12-06 | End: 2023-12-06 | Stop reason: HOSPADM

## 2023-12-06 RX ORDER — POTASSIUM CHLORIDE 14.9 MG/ML
20 INJECTION INTRAVENOUS
Status: COMPLETED | OUTPATIENT
Start: 2023-12-06 | End: 2023-12-07

## 2023-12-06 RX ORDER — KETOROLAC TROMETHAMINE 30 MG/ML
15 INJECTION, SOLUTION INTRAMUSCULAR; INTRAVENOUS EVERY 6 HOURS PRN
Status: DISCONTINUED | OUTPATIENT
Start: 2023-12-06 | End: 2023-12-07

## 2023-12-06 RX ORDER — VANCOMYCIN HYDROCHLORIDE 1 G/20ML
INJECTION, POWDER, LYOPHILIZED, FOR SOLUTION INTRAVENOUS DAILY PRN
Status: DISCONTINUED | OUTPATIENT
Start: 2023-12-06 | End: 2023-12-06

## 2023-12-06 RX ORDER — DEXTROSE MONOHYDRATE 50 MG/ML
50 INJECTION, SOLUTION INTRAVENOUS CONTINUOUS
Status: DISCONTINUED | OUTPATIENT
Start: 2023-12-06 | End: 2023-12-06 | Stop reason: HOSPADM

## 2023-12-06 RX ORDER — POTASSIUM CHLORIDE 14.9 MG/ML
20 INJECTION INTRAVENOUS ONCE
Status: COMPLETED | OUTPATIENT
Start: 2023-12-06 | End: 2023-12-06

## 2023-12-06 RX ORDER — HYDROMORPHONE HYDROCHLORIDE 1 MG/ML
0.2 INJECTION, SOLUTION INTRAMUSCULAR; INTRAVENOUS; SUBCUTANEOUS EVERY 4 HOURS
Status: DISCONTINUED | OUTPATIENT
Start: 2023-12-06 | End: 2023-12-06 | Stop reason: HOSPADM

## 2023-12-06 RX ORDER — HEPARIN SODIUM 5000 [USP'U]/ML
5000 INJECTION, SOLUTION INTRAVENOUS; SUBCUTANEOUS EVERY 8 HOURS
Status: CANCELLED | OUTPATIENT
Start: 2023-12-06

## 2023-12-06 RX ORDER — VANCOMYCIN HYDROCHLORIDE 750 MG/150ML
750 INJECTION, SOLUTION INTRAVENOUS EVERY 24 HOURS
Status: DISCONTINUED | OUTPATIENT
Start: 2023-12-06 | End: 2023-12-07

## 2023-12-06 RX ORDER — HEPARIN SODIUM 5000 [USP'U]/ML
5000 INJECTION, SOLUTION INTRAVENOUS; SUBCUTANEOUS EVERY 8 HOURS
Status: DISCONTINUED | OUTPATIENT
Start: 2023-12-06 | End: 2023-12-07

## 2023-12-06 RX ORDER — VANCOMYCIN HYDROCHLORIDE 1 G/200ML
1000 INJECTION, SOLUTION INTRAVENOUS ONCE
Status: DISCONTINUED | OUTPATIENT
Start: 2023-12-06 | End: 2023-12-06 | Stop reason: HOSPADM

## 2023-12-06 RX ORDER — OLANZAPINE 5 MG/1
5 TABLET, ORALLY DISINTEGRATING ORAL EVERY 6 HOURS PRN
Status: DISCONTINUED | OUTPATIENT
Start: 2023-12-06 | End: 2023-12-06 | Stop reason: HOSPADM

## 2023-12-06 RX ORDER — ACETAMINOPHEN 160 MG/5ML
650 SOLUTION ORAL EVERY 6 HOURS PRN
Status: DISCONTINUED | OUTPATIENT
Start: 2023-12-06 | End: 2023-12-06

## 2023-12-06 RX ORDER — LORAZEPAM 2 MG/ML
0.5 INJECTION INTRAMUSCULAR EVERY 4 HOURS PRN
Status: DISCONTINUED | OUTPATIENT
Start: 2023-12-06 | End: 2023-12-07

## 2023-12-06 RX ORDER — DEXTROSE MONOHYDRATE 50 MG/ML
50 INJECTION, SOLUTION INTRAVENOUS CONTINUOUS
Status: DISCONTINUED | OUTPATIENT
Start: 2023-12-06 | End: 2023-12-07

## 2023-12-06 RX ORDER — DOPAMINE HYDROCHLORIDE 160 MG/100ML
1-5 INJECTION, SOLUTION INTRAVENOUS CONTINUOUS
Status: DISCONTINUED | OUTPATIENT
Start: 2023-12-06 | End: 2023-12-06

## 2023-12-06 RX ORDER — DOPAMINE HYDROCHLORIDE 160 MG/100ML
1-5 INJECTION, SOLUTION INTRAVENOUS CONTINUOUS
Status: DISCONTINUED | OUTPATIENT
Start: 2023-12-06 | End: 2023-12-07

## 2023-12-06 RX ORDER — DEXTROSE MONOHYDRATE 50 MG/ML
50 INJECTION, SOLUTION INTRAVENOUS CONTINUOUS
Status: DISCONTINUED | OUTPATIENT
Start: 2023-12-06 | End: 2023-12-06

## 2023-12-06 RX ORDER — DEXTROSE MONOHYDRATE AND SODIUM CHLORIDE 5; .45 G/100ML; G/100ML
50 INJECTION, SOLUTION INTRAVENOUS CONTINUOUS
Status: DISCONTINUED | OUTPATIENT
Start: 2023-12-06 | End: 2023-12-06

## 2023-12-06 RX ORDER — HALOPERIDOL 2 MG/ML
0.5 SOLUTION ORAL EVERY 4 HOURS PRN
Status: DISCONTINUED | OUTPATIENT
Start: 2023-12-06 | End: 2023-12-07

## 2023-12-06 RX ADMIN — DEXTROSE MONOHYDRATE 50 ML/HR: 50 INJECTION, SOLUTION INTRAVENOUS at 10:25

## 2023-12-06 RX ADMIN — PIPERACILLIN SODIUM AND TAZOBACTAM SODIUM 2.25 G: 2; .25 INJECTION, SOLUTION INTRAVENOUS at 03:11

## 2023-12-06 RX ADMIN — POTASSIUM CHLORIDE 20 MEQ: 14.9 INJECTION, SOLUTION INTRAVENOUS at 07:39

## 2023-12-06 RX ADMIN — VANCOMYCIN HYDROCHLORIDE 750 MG: 750 INJECTION, SOLUTION INTRAVENOUS at 19:35

## 2023-12-06 RX ADMIN — DEXTROSE MONOHYDRATE 50 ML/HR: 50 INJECTION, SOLUTION INTRAVENOUS at 15:53

## 2023-12-06 RX ADMIN — DOPAMINE HYDROCHLORIDE 4 MCG/KG/MIN: 160 INJECTION, SOLUTION INTRAVENOUS at 15:53

## 2023-12-06 RX ADMIN — DEXTROSE AND SODIUM CHLORIDE 50 ML/HR: 5; 450 INJECTION, SOLUTION INTRAVENOUS at 03:11

## 2023-12-06 RX ADMIN — MUPIROCIN: 20 OINTMENT TOPICAL at 08:46

## 2023-12-06 RX ADMIN — HYDROCORTISONE SODIUM SUCCINATE 50 MG: 100 INJECTION, POWDER, FOR SOLUTION INTRAMUSCULAR; INTRAVENOUS at 04:33

## 2023-12-06 RX ADMIN — HYDROCORTISONE SODIUM SUCCINATE 50 MG: 100 INJECTION, POWDER, FOR SOLUTION INTRAMUSCULAR; INTRAVENOUS at 10:32

## 2023-12-06 RX ADMIN — PIPERACILLIN SODIUM AND TAZOBACTAM SODIUM 3.38 G: 3; .375 INJECTION, SOLUTION INTRAVENOUS at 15:52

## 2023-12-06 RX ADMIN — PIPERACILLIN SODIUM AND TAZOBACTAM SODIUM 2.25 G: 2; .25 INJECTION, SOLUTION INTRAVENOUS at 08:46

## 2023-12-06 RX ADMIN — HYDROCORTISONE SODIUM SUCCINATE 50 MG: 100 INJECTION, POWDER, FOR SOLUTION INTRAMUSCULAR; INTRAVENOUS at 21:45

## 2023-12-06 RX ADMIN — PIPERACILLIN SODIUM AND TAZOBACTAM SODIUM 3.38 G: 3; .375 INJECTION, SOLUTION INTRAVENOUS at 20:46

## 2023-12-06 RX ADMIN — HYDROCORTISONE SODIUM SUCCINATE 50 MG: 100 INJECTION, POWDER, FOR SOLUTION INTRAMUSCULAR; INTRAVENOUS at 15:52

## 2023-12-06 ASSESSMENT — PAIN - FUNCTIONAL ASSESSMENT
PAIN_FUNCTIONAL_ASSESSMENT: CPOT (CRITICAL CARE PAIN OBSERVATION TOOL)
PAIN_FUNCTIONAL_ASSESSMENT: 0-10
PAIN_FUNCTIONAL_ASSESSMENT: CPOT (CRITICAL CARE PAIN OBSERVATION TOOL)
PAIN_FUNCTIONAL_ASSESSMENT: 0-10

## 2023-12-06 ASSESSMENT — PAIN SCALES - GENERAL
PAINLEVEL_OUTOF10: 0 - NO PAIN

## 2023-12-06 ASSESSMENT — COGNITIVE AND FUNCTIONAL STATUS - GENERAL
PERSONAL GROOMING: A LOT
HELP NEEDED FOR BATHING: A LOT
TOILETING: TOTAL
CLIMB 3 TO 5 STEPS WITH RAILING: TOTAL
DRESSING REGULAR LOWER BODY CLOTHING: TOTAL
MOVING FROM LYING ON BACK TO SITTING ON SIDE OF FLAT BED WITH BEDRAILS: TOTAL
DRESSING REGULAR UPPER BODY CLOTHING: A LOT
MOVING TO AND FROM BED TO CHAIR: TOTAL
WALKING IN HOSPITAL ROOM: TOTAL
STANDING UP FROM CHAIR USING ARMS: TOTAL
EATING MEALS: A LOT
DAILY ACTIVITIY SCORE: 10
PATIENT BASELINE BEDBOUND: NO
MOBILITY SCORE: 6
TURNING FROM BACK TO SIDE WHILE IN FLAT BAD: TOTAL

## 2023-12-06 NOTE — PROGRESS NOTES
Physical Therapy                 Therapy Communication Note    Patient Name: Nay Frost  MRN: [id number]  Today's Date: 12/6/2023     Discipline: Physical Therapy    Missed Visit Reason: Missed Visit Reason:  (meeting with hospice this date; HR in low-mid 30s at rest at times. PT will hold.)    Missed Time: Attempt    Comment:

## 2023-12-06 NOTE — PROGRESS NOTES
Occupational Therapy                 Therapy Communication Note    Patient Name: Nay Frost  MRN: [id number]  Today's Date: 12/6/2023     Discipline: Occupational Therapy    Missed Visit Reason: Missed Visit Reason:  (Plan for meeting with hospice today; patient with HR in low-mid 30s at rest in supine; will hold.)    Missed Time: Attempt    Comment:  Lilli Artis OTR/L

## 2023-12-06 NOTE — CARE PLAN
The patient's goals for the shift include      The clinical goals for the shift include stabilize pt's blood pressure    Over the shift, the patient did not make progress toward the following goals. Barriers to progression include pt unable to turn self safely. Recommendations to address these barriers include reposition pt q 2 with log roll.

## 2023-12-06 NOTE — HOSPITAL COURSE
Nay Falcon is an 82 yo F with a PMHx of Alzheimer's disease (minimally verbal at baseline), Crohn's disease, HTN, HLD, insomnia, and prior seizures who initially presented 12/3 from home nursing care facility due to concern for left thumb infection. Found to be acutely hypernatremic (169) and in septic shock with evidence of right-sided pneumonia and UTI.     Underwent debridement of left thumb with ortho in the ED, with overall low concern for skin/soft tissue infection as source of sepsis.     Admitted to the mICU, started on Levophed and broad-spectrum antibiotics.  Received volume resuscitation with isotonic solutions and was started on D5W-1/2 saline infusion for hypernatremia.    ICU course c/b new sinus bradycardia with 1st degree AV block requiring dopamine drip.

## 2023-12-06 NOTE — CONSULTS
Vancomycin Dosing by Pharmacy- Cessation of Therapy    Consult to pharmacy for vancomycin dosing has been discontinued by the prescriber, pharmacy will sign off at this time.    Please call pharmacy if there are further questions or re-enter a consult if vancomycin is resumed.     Wolf Cook, JadonD

## 2023-12-06 NOTE — CARE PLAN
The patient's goals for the shift include  remaining free from injury and no falls.    Over the shift, the patient did not make progress toward the following goals. Barriers to progression include altered mental status. Recommendations to address these barriers include continue with redirection and assessing need for medical devices.    Problem: Safety - Medical Restraint  Goal: Free from restraint(s) (Restraint for Interference with Medical Device)  Outcome: Not Progressing

## 2023-12-06 NOTE — SIGNIFICANT EVENT
Extensive discussion held with patient's daughter, son, and hospice representative from [facility] at bedside this afternoon. Family had been considering comfort care with hospice but ultimately decided against referral for hospice bed at [facility].     Family would like to continue treatment of infection and hypernatremia with antibiotics and fluids. They would like to trial the patient off of dopamine drip, with the understanding that patient is at high risk of arrest off of drip, and that resuscitation would not be attempted in the setting of her DNR/DNI status. Family is understanding of this and wish to proceed with knowledge that patient may pass within a few hours. They are aware that the patient could pass shortly but also may survive for several days to weeks.     Family would prefer to remain NPO for time being, reports that patient hasn't been interested in eating and have low concern that she is in discomfort from hunger.     Dopamine drip discontinued at 1830. Comfort care PRNs put in place. Will continue ongoing GOC plans in the morning.

## 2023-12-06 NOTE — NURSING NOTE
Pt admitted hospice GIP. Plan is for her to transfer to [facility] once bed becomes available. She is on wait list. All forms completed. Will just need ambulance transport set up. Ok to continue dopamine drip while in hospital with plan to discontinue upon transfer to [facility].

## 2023-12-06 NOTE — NURSING NOTE
Family changed their minds regarding starting hospice care today. No [facility] forms signed. Pt is no longer on the wait list for a bed at [facility]. MD and unit secretary aware that hospital chart needs flipped back to ICU chart. [facility] will contact pts daughter tomorrow for follow up.

## 2023-12-06 NOTE — PROGRESS NOTES
"Nay Frost is a 83 y.o. female on day 3 of admission presenting with Septic shock (CMS/HCC).    Subjective   NAEO. Patient switched from D5W to D5W-1/2 NS overnight due to concern for over-rapid sodium correction (Na 151 at midnight). Repeat Na 153 this morning. HR continues to trend in 30s-40s while asleep on dopamine drip at 5 mcg/kg/hr.    Patient remains at mental status baseline (awake, alert, speaking a few words/simple sentences).       Objective     Physical Exam  Constitutional:       General: She is not in acute distress.     Comments: Sitting up in bed, chatting with son at bedside   HENT:      Head: Normocephalic and atraumatic.      Nose: Nose normal.      Mouth/Throat:      Mouth: Mucous membranes are dry.      Pharynx: No oropharyngeal exudate or posterior oropharyngeal erythema.   Eyes:      General: No scleral icterus.     Extraocular Movements: Extraocular movements intact.      Pupils: Pupils are equal, round, and reactive to light.   Cardiovascular:      Rate and Rhythm: Regular rhythm. Bradycardia present.      Pulses: Normal pulses.      Heart sounds: Normal heart sounds.   Pulmonary:      Effort: Pulmonary effort is normal. No respiratory distress.      Breath sounds: Normal breath sounds.   Abdominal:      General: Abdomen is flat. There is no distension.      Palpations: Abdomen is soft.      Tenderness: There is no abdominal tenderness.   Musculoskeletal:         General: Normal range of motion.      Cervical back: Normal range of motion and neck supple.      Right lower leg: No edema.      Left lower leg: No edema.   Skin:     General: Skin is warm and dry.   Neurological:      General: No focal deficit present.      Mental Status: She is alert. Mental status is at baseline.   Psychiatric:      Comments: At baseline         Last Recorded Vitals  Blood pressure 134/72, pulse (!) 48, temperature 35.9 °C (96.6 °F), temperature source Temporal, resp. rate 17, height 1.6 m (5' 3\"), " weight 52 kg (114 lb 10.2 oz), SpO2 97 %.  Intake/Output last 3 Shifts:  I/O last 3 completed shifts:  In: 4463.3 (86.7 mL/kg) [I.V.:2708.3 (52.6 mL/kg); IV Piggyback:1755]  Out: 2565 (49.8 mL/kg) [Urine:2565 (1.4 mL/kg/hr)]  Dosing Weight: 51.5 kg     Scheduled medications  hydrocortisone sodium succinate, 50 mg, intravenous, q6h  magnesium sulfate, 2 g, intravenous, Once  mupirocin, , Each Nostril, BID  piperacillin-tazobactam, 2.25 g, intravenous, q6h      Continuous medications  dextrose 5 % in water (D5W), 50 mL/hr, Last Rate: 50 mL/hr (12/06/23 1025)  DOPamine, 1-10 mcg/kg/min (Dosing Weight), Last Rate: 3.5 mcg/kg/min (12/06/23 1209)      PRN medications  PRN medications: dextrose, diphenhydramine-zinc acetate, glucagon, oxygen, vancomycin     Relevant Results  Results for orders placed or performed during the hospital encounter of 12/03/23 (from the past 24 hour(s))   Electrocardiogram, 12-lead PRN ACS symptoms   Result Value Ref Range    Ventricular Rate 56 BPM    Atrial Rate 59 BPM    ID Interval 224 ms    QRS Duration 106 ms    QT Interval 484 ms    QTC Calculation(Bazett) 467 ms    R Axis -49 degrees    T Axis 66 degrees    QRS Count 9 beats    Q Onset 228 ms    P Onset 116 ms    P Offset 188 ms    T Offset 470 ms    QTC Fredericia 473 ms   POCT GLUCOSE   Result Value Ref Range    POCT Glucose 179 (H) 74 - 99 mg/dL   Vancomycin   Result Value Ref Range    Vancomycin 10.8 5.0 - 20.0 ug/mL   Renal function panel   Result Value Ref Range    Glucose 169 (H) 74 - 99 mg/dL    Sodium 157 (H) 136 - 145 mmol/L    Potassium 4.0 3.5 - 5.3 mmol/L    Chloride 124 (H) 98 - 107 mmol/L    Bicarbonate 24 21 - 32 mmol/L    Anion Gap 13 10 - 20 mmol/L    Urea Nitrogen 52 (H) 6 - 23 mg/dL    Creatinine 1.32 (H) 0.50 - 1.05 mg/dL    eGFR 40 (L) >60 mL/min/1.73m*2    Calcium 8.8 8.6 - 10.6 mg/dL    Phosphorus 3.3 2.5 - 4.9 mg/dL    Albumin 3.1 (L) 3.4 - 5.0 g/dL   Transthoracic Echo (TTE) Complete   Result Value Ref Range     AV pk ariel 1.16     LVOT diam 2.04     LV biplane EF 74     MV avg E/e' ratio 11.46     MV E/A ratio 1.99     RV free wall pk S' 13.00     LVIDd 3.30     RVSP 33.8     Aortic Valve Area by Continuity of Peak Velocity 2.94     AV pk grad 5.4     LV A4C EF 75.1    POCT GLUCOSE   Result Value Ref Range    POCT Glucose 140 (H) 74 - 99 mg/dL   POCT GLUCOSE   Result Value Ref Range    POCT Glucose 208 (H) 74 - 99 mg/dL   POCT GLUCOSE   Result Value Ref Range    POCT Glucose 146 (H) 74 - 99 mg/dL   Renal function panel   Result Value Ref Range    Glucose 162 (H) 74 - 99 mg/dL    Sodium 151 (H) 136 - 145 mmol/L    Potassium 3.7 3.5 - 5.3 mmol/L    Chloride 118 (H) 98 - 107 mmol/L    Bicarbonate 23 21 - 32 mmol/L    Anion Gap 14 10 - 20 mmol/L    Urea Nitrogen 47 (H) 6 - 23 mg/dL    Creatinine 1.25 (H) 0.50 - 1.05 mg/dL    eGFR 42 (L) >60 mL/min/1.73m*2    Calcium 8.9 8.6 - 10.6 mg/dL    Phosphorus 3.2 2.5 - 4.9 mg/dL    Albumin 3.2 (L) 3.4 - 5.0 g/dL   POCT GLUCOSE   Result Value Ref Range    POCT Glucose 161 (H) 74 - 99 mg/dL   CBC and Auto Differential   Result Value Ref Range    WBC 10.5 4.4 - 11.3 x10*3/uL    nRBC 0.0 0.0 - 0.0 /100 WBCs    RBC 3.16 (L) 4.00 - 5.20 x10*6/uL    Hemoglobin 9.7 (L) 12.0 - 16.0 g/dL    Hematocrit 32.4 (L) 36.0 - 46.0 %     (H) 80 - 100 fL    MCH 30.7 26.0 - 34.0 pg    MCHC 29.9 (L) 32.0 - 36.0 g/dL    RDW 14.5 11.5 - 14.5 %    Platelets 70 (L) 150 - 450 x10*3/uL    Neutrophils % 93.8 40.0 - 80.0 %    Immature Granulocytes %, Automated 0.5 0.0 - 0.9 %    Lymphocytes % 3.7 13.0 - 44.0 %    Monocytes % 1.8 2.0 - 10.0 %    Eosinophils % 0.0 0.0 - 6.0 %    Basophils % 0.2 0.0 - 2.0 %    Neutrophils Absolute 9.80 (H) 1.60 - 5.50 x10*3/uL    Immature Granulocytes Absolute, Automated 0.05 0.00 - 0.50 x10*3/uL    Lymphocytes Absolute 0.39 (L) 0.80 - 3.00 x10*3/uL    Monocytes Absolute 0.19 0.05 - 0.80 x10*3/uL    Eosinophils Absolute 0.00 0.00 - 0.40 x10*3/uL    Basophils Absolute 0.02 0.00 -  0.10 x10*3/uL   Renal Function Panel   Result Value Ref Range    Glucose 194 (H) 74 - 99 mg/dL    Sodium 153 (H) 136 - 145 mmol/L    Potassium 3.8 3.5 - 5.3 mmol/L    Chloride 119 (H) 98 - 107 mmol/L    Bicarbonate 25 21 - 32 mmol/L    Anion Gap 13 10 - 20 mmol/L    Urea Nitrogen 41 (H) 6 - 23 mg/dL    Creatinine 1.11 (H) 0.50 - 1.05 mg/dL    eGFR 49 (L) >60 mL/min/1.73m*2    Calcium 8.9 8.6 - 10.6 mg/dL    Phosphorus 3.0 2.5 - 4.9 mg/dL    Albumin 3.1 (L) 3.4 - 5.0 g/dL   Magnesium   Result Value Ref Range    Magnesium 1.99 1.60 - 2.40 mg/dL   POCT GLUCOSE   Result Value Ref Range    POCT Glucose 141 (H) 74 - 99 mg/dL     Imaging:   TRANSTHORACIC ECHO (TTE) COMPLETE   CONCLUSIONS:   1. Left ventricular systolic function is normal with a 65-70% estimated ejection fraction.   2. Mild to moderate mitral valve regurgitation.   3. There is mild thickening of the tricuspid valve leaflets. There is mild to moderate tricuspid regurgitation.   4. Slightly elevated RVSP.            Malnutrition Diagnosis Status: New  Malnutrition Diagnosis: Severe malnutrition related to chronic disease or condition  As Evidenced by: pt with a 17% weight loss in about a year and a half along with severe muscle and fat loss on physical exam; suspect PO intake was inadequate PTA  I agree with the dietitian's malnutrition diagnosis.      Assessment/Plan   Principal Problem:    Septic shock (CMS/HCC)    Nay Frost is a 83 y.o. female with Alzheimer's disease (minimally conversant at baseline), Crohn's disease, and seizures who presents as a transfer from [facility] with presumed septic shock, with suspected soft tissue vs respiratory vs urinary source. ICU course complicated by new-onset sinus bradycardia with 1st degree AV block requiring dopamine drip.      Remains off of pressors for >24 hours, dopamine drip maintained overnight at rate of 5 mcg/kg/min with HR trending in mid 30s-40s while patient asleep. Unclear how much of  an effect dopamine gtt is having at this point.     Hypernatremia gradually improving, Na 153 this AM. FDW (for goal Na 145) 1.1L.    Family planning to meet with hospice at bedside at 1:30 this afternoon.     Updates 12/6:  -Plan pending family's discussion with hospice this afternoon, may be transitioning to comfort care  -In interim:  -Will discontinue D5W-1/2NS and resume D5W at 50 mL/hr   -Will attempt to wean dopamine drip   -MRSA nares positive, urine bacilli not yet speciated. Will continue vanc/zoysn for time being      NEURO  #AMS on Alzheimer's dementia  -likely multifactorial in setting of hypernatremia, septic shock, and uremia  -Appears to be close to mental status baseline (awake but minimally verbal, not oriented)  -holding home donepezil and trazodone in setting of NPO     PULM  #Concern for RUL/RLL pneumonia  -GGOs noted in RUL and RLL on CT chest, may represent HAP vs aspiration pneumonia  -MRSA nares positive  -Abx regimen as per ID section below     CV  #Shock, most likely mixed septic and hypovolemic-improving  -Na elevated to 169 on admission, family concerned that patient has not been kept hydrated at nursing facility  -BP stable off of pressors for >24 hrs  -Holding on further fluid resuscitation given net +5L since admission  -Abx as per below        #Sinus bradycardia  #First degree AV block-resolved  -HR improved on dopamine drip, currently trending in 60s-70s  -May represent paradoxical response to sepsis vs SSS  -TSH WNL (2.25)  -Will attempt to wean dopamine drip ahead of possible comfort care transition this afternoon     FEN/GI  #Diet/enteral access  #Malnutrition  -NPO for AMS and critical illness  -Failed bedside swallow evaluation, holding off on MBS for time being pending goals of care decision. Will start comfort feeds if family decides on comfort care.  -Nutrition following, appreciate recs     RENAL/  #IH, likely pre-renal in setting of shock- resolving  -Cr 2.1 on  admission, bl 1.0  -Cr improved to 1.11 after ongoing fluid resuscitation  -Will continue to monitor     #Hypernatremia, acute on chronic  -baseline 145-150, admission 169 (12/3 at 3 PM)  -Na improved to 153 from 158 yesterday morning   -FWD with goal Na of 145: 1.1L  -Will start D5W at 50 mL/hr, follow-up PM RFP     HEME/ONC  #Chronic anemia   #Chronic thrombocytopenia  -Hemoglobin baseline (11-12)  -Platelet baseline ()  -Unclear etiology of chronic thrombocytopenia, appears to have been managed conservatively/with observation in the past  -Platelet count both down-trending today to 70, no evidence of active bleeding. Will consider further work-up pending hospice decision later today  -Will continue to monitor, maintain active type and screen     ID  #Septic shock   -Source(s): right perihilar pneumonia, UTI  -MRSA nares positive  -UCx growing enteric bacilli  -Blood cx NGD2  -Continue vanc/zosyn for HAP/UTI coverage      F: D5W at 50 mL/hr  E: PRN  N: NPO   A: R fem triple lumen CVC, PIV, left radial arterial line  O2: RA  Drips: dopamine 5 mcg/kg/min  Abx: vanc/zosyn  DVT PPx: SQH     CODE STATUS: DNAR/DNI, no dialysis, ok for one attempt at defib if reversible (confirmed with daughter on admission)  SURROGATE DECISION MAKER: Daughter Zeenat [phone] (cell)  Second call: Hector Walsh (son-in-law) [phone], home [phone], office [phone]       Margaux Dailey MD

## 2023-12-07 ENCOUNTER — TELEPHONE (OUTPATIENT)
Dept: PHARMACY | Facility: HOSPITAL | Age: 83
End: 2023-12-07
Payer: COMMERCIAL

## 2023-12-07 LAB
ALBUMIN SERPL BCP-MCNC: 2.8 G/DL (ref 3.4–5)
ANION GAP SERPL CALC-SCNC: 12 MMOL/L (ref 10–20)
BASOPHILS # BLD AUTO: 0.01 X10*3/UL (ref 0–0.1)
BASOPHILS NFR BLD AUTO: 0.1 %
BUN SERPL-MCNC: 36 MG/DL (ref 6–23)
CALCIUM SERPL-MCNC: 8.6 MG/DL (ref 8.6–10.6)
CHLORIDE SERPL-SCNC: 117 MMOL/L (ref 98–107)
CO2 SERPL-SCNC: 23 MMOL/L (ref 21–32)
CREAT SERPL-MCNC: 1.01 MG/DL (ref 0.5–1.05)
EOSINOPHIL # BLD AUTO: 0 X10*3/UL (ref 0–0.4)
EOSINOPHIL NFR BLD AUTO: 0 %
ERYTHROCYTE [DISTWIDTH] IN BLOOD BY AUTOMATED COUNT: 14.5 % (ref 11.5–14.5)
GFR SERPL CREATININE-BSD FRML MDRD: 55 ML/MIN/1.73M*2
GLUCOSE BLD MANUAL STRIP-MCNC: 147 MG/DL (ref 74–99)
GLUCOSE BLD MANUAL STRIP-MCNC: 149 MG/DL (ref 74–99)
GLUCOSE BLD MANUAL STRIP-MCNC: 154 MG/DL (ref 74–99)
GLUCOSE BLD MANUAL STRIP-MCNC: 168 MG/DL (ref 74–99)
GLUCOSE SERPL-MCNC: 151 MG/DL (ref 74–99)
HCT VFR BLD AUTO: 30.5 % (ref 36–46)
HGB BLD-MCNC: 9.4 G/DL (ref 12–16)
IMM GRANULOCYTES # BLD AUTO: 0.07 X10*3/UL (ref 0–0.5)
IMM GRANULOCYTES NFR BLD AUTO: 0.9 % (ref 0–0.9)
LYMPHOCYTES # BLD AUTO: 0.34 X10*3/UL (ref 0.8–3)
LYMPHOCYTES NFR BLD AUTO: 4.3 %
MAGNESIUM SERPL-MCNC: 1.85 MG/DL (ref 1.6–2.4)
MCH RBC QN AUTO: 30.6 PG (ref 26–34)
MCHC RBC AUTO-ENTMCNC: 30.8 G/DL (ref 32–36)
MCV RBC AUTO: 99 FL (ref 80–100)
MONOCYTES # BLD AUTO: 0.12 X10*3/UL (ref 0.05–0.8)
MONOCYTES NFR BLD AUTO: 1.5 %
NEUTROPHILS # BLD AUTO: 7.38 X10*3/UL (ref 1.6–5.5)
NEUTROPHILS NFR BLD AUTO: 93.2 %
NRBC BLD-RTO: 0 /100 WBCS (ref 0–0)
PHOSPHATE SERPL-MCNC: 2.7 MG/DL (ref 2.5–4.9)
PLATELET # BLD AUTO: 63 X10*3/UL (ref 150–450)
POTASSIUM SERPL-SCNC: 4 MMOL/L (ref 3.5–5.3)
RBC # BLD AUTO: 3.07 X10*6/UL (ref 4–5.2)
SODIUM SERPL-SCNC: 148 MMOL/L (ref 136–145)
WBC # BLD AUTO: 7.9 X10*3/UL (ref 4.4–11.3)

## 2023-12-07 PROCEDURE — 37799 UNLISTED PX VASCULAR SURGERY: CPT | Performed by: STUDENT IN AN ORGANIZED HEALTH CARE EDUCATION/TRAINING PROGRAM

## 2023-12-07 PROCEDURE — 2500000004 HC RX 250 GENERAL PHARMACY W/ HCPCS (ALT 636 FOR OP/ED): Performed by: STUDENT IN AN ORGANIZED HEALTH CARE EDUCATION/TRAINING PROGRAM

## 2023-12-07 PROCEDURE — 99232 SBSQ HOSP IP/OBS MODERATE 35: CPT

## 2023-12-07 PROCEDURE — 94762 N-INVAS EAR/PLS OXIMTRY CONT: CPT

## 2023-12-07 PROCEDURE — 2500000004 HC RX 250 GENERAL PHARMACY W/ HCPCS (ALT 636 FOR OP/ED)

## 2023-12-07 PROCEDURE — 80069 RENAL FUNCTION PANEL: CPT | Performed by: STUDENT IN AN ORGANIZED HEALTH CARE EDUCATION/TRAINING PROGRAM

## 2023-12-07 PROCEDURE — 85025 COMPLETE CBC W/AUTO DIFF WBC: CPT | Performed by: STUDENT IN AN ORGANIZED HEALTH CARE EDUCATION/TRAINING PROGRAM

## 2023-12-07 PROCEDURE — 82947 ASSAY GLUCOSE BLOOD QUANT: CPT

## 2023-12-07 PROCEDURE — 83735 ASSAY OF MAGNESIUM: CPT | Performed by: STUDENT IN AN ORGANIZED HEALTH CARE EDUCATION/TRAINING PROGRAM

## 2023-12-07 PROCEDURE — 1100000001 HC PRIVATE ROOM DAILY

## 2023-12-07 RX ORDER — HYDROMORPHONE HYDROCHLORIDE 1 MG/ML
0.2 INJECTION, SOLUTION INTRAMUSCULAR; INTRAVENOUS; SUBCUTANEOUS EVERY 4 HOURS
Status: DISCONTINUED | OUTPATIENT
Start: 2023-12-07 | End: 2023-12-07

## 2023-12-07 RX ORDER — GLYCOPYRROLATE 0.2 MG/ML
0.2 INJECTION INTRAMUSCULAR; INTRAVENOUS EVERY 4 HOURS PRN
Status: DISCONTINUED | OUTPATIENT
Start: 2023-12-07 | End: 2023-12-09 | Stop reason: HOSPADM

## 2023-12-07 RX ORDER — LORAZEPAM 2 MG/ML
0.5 INJECTION INTRAMUSCULAR EVERY 4 HOURS PRN
Status: DISCONTINUED | OUTPATIENT
Start: 2023-12-07 | End: 2023-12-09 | Stop reason: HOSPADM

## 2023-12-07 RX ORDER — MUPIROCIN 20 MG/G
OINTMENT TOPICAL 2 TIMES DAILY
Status: DISCONTINUED | OUTPATIENT
Start: 2023-12-07 | End: 2023-12-07

## 2023-12-07 RX ORDER — HYDROMORPHONE HYDROCHLORIDE 1 MG/ML
0.2 INJECTION, SOLUTION INTRAMUSCULAR; INTRAVENOUS; SUBCUTANEOUS EVERY 4 HOURS PRN
Status: DISCONTINUED | OUTPATIENT
Start: 2023-12-07 | End: 2023-12-09 | Stop reason: HOSPADM

## 2023-12-07 RX ORDER — HYOSCYAMINE SULFATE 0.12 MG/1
0.12 TABLET, ORALLY DISINTEGRATING ORAL EVERY 4 HOURS PRN
Status: DISCONTINUED | OUTPATIENT
Start: 2023-12-07 | End: 2023-12-09 | Stop reason: HOSPADM

## 2023-12-07 RX ORDER — KETOROLAC TROMETHAMINE 30 MG/ML
15 INJECTION, SOLUTION INTRAMUSCULAR; INTRAVENOUS EVERY 6 HOURS PRN
Status: DISCONTINUED | OUTPATIENT
Start: 2023-12-07 | End: 2023-12-09 | Stop reason: HOSPADM

## 2023-12-07 RX ORDER — MAGNESIUM SULFATE HEPTAHYDRATE 40 MG/ML
2 INJECTION, SOLUTION INTRAVENOUS ONCE
Status: DISCONTINUED | OUTPATIENT
Start: 2023-12-07 | End: 2023-12-07

## 2023-12-07 RX ADMIN — POTASSIUM CHLORIDE 20 MEQ: 14.9 INJECTION, SOLUTION INTRAVENOUS at 00:01

## 2023-12-07 RX ADMIN — DEXTROSE MONOHYDRATE 50 ML/HR: 50 INJECTION, SOLUTION INTRAVENOUS at 03:16

## 2023-12-07 RX ADMIN — POTASSIUM CHLORIDE 20 MEQ: 14.9 INJECTION, SOLUTION INTRAVENOUS at 01:47

## 2023-12-07 RX ADMIN — PIPERACILLIN SODIUM AND TAZOBACTAM SODIUM 3.38 G: 3; .375 INJECTION, SOLUTION INTRAVENOUS at 03:16

## 2023-12-07 RX ADMIN — MAGNESIUM SULFATE HEPTAHYDRATE 2 G: 40 INJECTION, SOLUTION INTRAVENOUS at 06:57

## 2023-12-07 RX ADMIN — HYDROMORPHONE HYDROCHLORIDE 0.2 MG: 1 INJECTION, SOLUTION INTRAMUSCULAR; INTRAVENOUS; SUBCUTANEOUS at 08:23

## 2023-12-07 RX ADMIN — HYDROCORTISONE SODIUM SUCCINATE 50 MG: 100 INJECTION, POWDER, FOR SOLUTION INTRAMUSCULAR; INTRAVENOUS at 03:15

## 2023-12-07 RX ADMIN — HYDROMORPHONE HYDROCHLORIDE 0.2 MG: 1 INJECTION, SOLUTION INTRAMUSCULAR; INTRAVENOUS; SUBCUTANEOUS at 16:27

## 2023-12-07 RX ADMIN — HYDROMORPHONE HYDROCHLORIDE 0.2 MG: 1 INJECTION, SOLUTION INTRAMUSCULAR; INTRAVENOUS; SUBCUTANEOUS at 12:37

## 2023-12-07 RX ADMIN — HYDROMORPHONE HYDROCHLORIDE 0.2 MG: 1 INJECTION, SOLUTION INTRAMUSCULAR; INTRAVENOUS; SUBCUTANEOUS at 20:35

## 2023-12-07 SDOH — SOCIAL STABILITY: SOCIAL INSECURITY: DO YOU FEEL UNSAFE GOING BACK TO THE PLACE WHERE YOU ARE LIVING?: UNABLE TO ASSESS

## 2023-12-07 SDOH — SOCIAL STABILITY: SOCIAL INSECURITY: ARE THERE ANY APPARENT SIGNS OF INJURIES/BEHAVIORS THAT COULD BE RELATED TO ABUSE/NEGLECT?: UNABLE TO ASSESS

## 2023-12-07 SDOH — SOCIAL STABILITY: SOCIAL INSECURITY: HAS ANYONE EVER THREATENED TO HURT YOUR FAMILY OR YOUR PETS?: UNABLE TO ASSESS

## 2023-12-07 SDOH — SOCIAL STABILITY: SOCIAL INSECURITY: HAVE YOU HAD THOUGHTS OF HARMING ANYONE ELSE?: UNABLE TO ASSESS

## 2023-12-07 SDOH — SOCIAL STABILITY: SOCIAL INSECURITY: DOES ANYONE TRY TO KEEP YOU FROM HAVING/CONTACTING OTHER FRIENDS OR DOING THINGS OUTSIDE YOUR HOME?: UNABLE TO ASSESS

## 2023-12-07 SDOH — SOCIAL STABILITY: SOCIAL INSECURITY: ARE YOU OR HAVE YOU BEEN THREATENED OR ABUSED PHYSICALLY, EMOTIONALLY, OR SEXUALLY BY ANYONE?: UNABLE TO ASSESS

## 2023-12-07 SDOH — SOCIAL STABILITY: SOCIAL INSECURITY: DO YOU FEEL ANYONE HAS EXPLOITED OR TAKEN ADVANTAGE OF YOU FINANCIALLY OR OF YOUR PERSONAL PROPERTY?: UNABLE TO ASSESS

## 2023-12-07 SDOH — SOCIAL STABILITY: SOCIAL INSECURITY: WERE YOU ABLE TO COMPLETE ALL THE BEHAVIORAL HEALTH SCREENINGS?: NO

## 2023-12-07 SDOH — SOCIAL STABILITY: SOCIAL INSECURITY: ABUSE: ADULT

## 2023-12-07 ASSESSMENT — PATIENT HEALTH QUESTIONNAIRE - PHQ9
1. LITTLE INTEREST OR PLEASURE IN DOING THINGS: NOT AT ALL
SUM OF ALL RESPONSES TO PHQ9 QUESTIONS 1 & 2: 0
2. FEELING DOWN, DEPRESSED OR HOPELESS: NOT AT ALL

## 2023-12-07 ASSESSMENT — ACTIVITIES OF DAILY LIVING (ADL)
FEEDING YOURSELF: DEPENDENT
DRESSING YOURSELF: DEPENDENT
JUDGMENT_ADEQUATE_SAFELY_COMPLETE_DAILY_ACTIVITIES: NO
BATHING: DEPENDENT
WALKS IN HOME: NEEDS ASSISTANCE
LACK_OF_TRANSPORTATION: NO
TOILETING: DEPENDENT
PATIENT'S MEMORY ADEQUATE TO SAFELY COMPLETE DAILY ACTIVITIES?: NO
HEARING - RIGHT EAR: FUNCTIONAL
HEARING - LEFT EAR: FUNCTIONAL
GROOMING: DEPENDENT
ADEQUATE_TO_COMPLETE_ADL: YES

## 2023-12-07 ASSESSMENT — LIFESTYLE VARIABLES
AUDIT-C TOTAL SCORE: 0
SKIP TO QUESTIONS 9-10: 1
HOW MANY STANDARD DRINKS CONTAINING ALCOHOL DO YOU HAVE ON A TYPICAL DAY: PATIENT DOES NOT DRINK
HOW OFTEN DO YOU HAVE A DRINK CONTAINING ALCOHOL: NEVER
AUDIT-C TOTAL SCORE: 0
HOW OFTEN DO YOU HAVE 6 OR MORE DRINKS ON ONE OCCASION: NEVER

## 2023-12-07 ASSESSMENT — PAIN SCALES - GENERAL
PAINLEVEL_OUTOF10: 0 - NO PAIN
PAINLEVEL_OUTOF10: 0 - NO PAIN

## 2023-12-07 ASSESSMENT — PAIN - FUNCTIONAL ASSESSMENT

## 2023-12-07 NOTE — CARE PLAN
Problem: Skin  Goal: Promote skin healing  Flowsheets (Taken 12/6/2023 2132)  Promote skin healing:   Assess skin/pad under line(s)/device(s)   Protective dressings over bony prominences   Rotate device position/do not position patient on device   Turn/reposition every 2 hours/use positioning/transfer devices   The patient's goals for the shift include      The clinical goals for the shift include Pt will not remove any safety equipment    Over the shift, the patient did not make progress toward the following goals. Barriers to progression include . Recommendations to address these barriers include .

## 2023-12-07 NOTE — PROGRESS NOTES
SOCIAL WORK NOTE (late entry)  Patient enrolled in R GIP, possible transfer to hospice house if stable. Social work to follow.  ABELARDO Randle, LISW-S ([id number])

## 2023-12-07 NOTE — CONSULTS
Vancomycin Dosing by Pharmacy- Cessation of Therapy    Consult to pharmacy for vancomycin dosing has been discontinued by the prescriber, pharmacy will sign off at this time.    Please call pharmacy if there are further questions or re-enter a consult if vancomycin is resumed.     Ludivina Wakefield, PharmD

## 2023-12-07 NOTE — PROGRESS NOTES
Spiritual Care Visit    Clinical Encounter Type  Visited With: Patient and family together  Routine Visit: Follow-up  Crisis Visit: Critical care  Referral From: Family  Referral To:          Values/Beliefs  Cultural Requests During Hospitalization: none noted  Spiritual Requests During Hospitalization: prayer, anointing (completed by Father Sterling 12/4/23)         Patient Spiritual Care Encounters  Suffering Severity: Mild  Fear Level: Mild  Feelings of Loneliness: Good  Feelings of Hopelessness: Good    Family Spiritual Care Encounters  Family Coping: Accepting  Family Participation in Care: Consistently demonstrated  Family Support During Treatment: Consistently demonstrated  Caregiver-Patient Relationship: Not compromised         PC-7 Assessment (Level of Unmet Needs)  Existential Struggle: None  Spiritual/Advent Struggle: None  Legacy: None  Relationships: None  Fear of Death/Dying: None  Values/Medical Decision Making: None  Ritual/Other: None  PC-7 Score: 0    SDAT (Spiritual Distress Assessment Tool)  Need for Life Balance: No evidence of unmet spiritual need  Need for Connection: No evidence of unmet spiritual need  Need for Values Acknowledgement: No evidence of unmet spiritual need  Need to Maintain Control: No evidence of unmet spiritual need  Need to Maintain Identity: No evidence of unmet spiritual need  SDAT Score: 0  SDAT Average Score: 0    Taxonomy  Intended Effects: Convey a calming presence, Demonstrate caring and concern, Justyna affirmation  Methods: Offer spiritual/Christianity support     received call from MICU RN. Family requesting  to come and provide the Anointing of the Sick to their loved one. This  went to assess, met the family and we discovered through documentation that Father Bert Anointed patient on 12/4/23. Family very happy with this.  provided support and prayer and let family know chaplaincy is available should they need it. Family  expressed gratitude.

## 2023-12-07 NOTE — PROGRESS NOTES
EDPD Note: Lab/Chart Reviewed    Reviewed Mr./Mrs./Ms. Nay Frost 's chart regarding a positive  Escherichia coli urine culture/result that was taken during their recent emergency room visit. The patient was admitted to [facility]  .Therefore, I will turn over care to the inpatient providers, as they already acknowledged these results.    Susceptibility data from last 90 days.  Collected Specimen Info Organism Amoxicillin/Clavulanate Ampicillin Ampicillin/Sulbactam Cefazolin Cefazolin (uncomplicated UTIs only) Ceftriaxone Ciprofloxacin Gentamicin Nitrofurantoin Piperacillin/Tazobactam Tobramycin   12/03/23 Swab from Anterior Nares Methicillin Resistant Staphylococcus aureus (MRSA)              12/03/23 Urine from Straight Catheter Escherichia coli S R R I S S S R S S I     Collected Specimen Info Organism Trimethoprim/Sulfamethoxazole   12/03/23 Swab from Anterior Nares Methicillin Resistant Staphylococcus aureus (MRSA)    12/03/23 Urine from Straight Catheter Escherichia coli S       No further follow up needed from EDPD Team.     SHANNA CRESPO, JadonD

## 2023-12-07 NOTE — CARE PLAN
Problem: Safety - Medical Restraint  Goal: Remains free of injury from restraints (Restraint for Interference with Medical Device)  Flowsheets (Taken 12/6/2023 2118)  Remains free of injury from restraints (restraint for interference with medical device):   Determine that other, less restrictive measures have been tried or would not be effective before applying the restraint   Evaluate the patient's condition at the time of restraint application   Inform patient/family regarding the reason for restraint   Every 2 hours: Monitor safety, psychosocial status, comfort, nutrition and hydration   The patient's goals for the shift include      The clinical goals for the shift include Pt will not remove any safety equipment    Over the shift, the patient did not make progress toward the following goals. Barriers to progression include . Recommendations to address these barriers include .

## 2023-12-07 NOTE — PROGRESS NOTES
"Vancomycin Dosing by Pharmacy- FOLLOW UP    Nay Frost is a 83 y.o. year old female who Pharmacy has been consulted for vancomycin dosing for pneumonia. Based on the patient's indication and renal status this patient is being dosed based on a goal AUC of 400-600.     Renal function is currently improving.    Current vancomycin dose: Was being dosed by levels, last received 1g on 12/5 PM    Most recent random level: 13.8 mcg/mL    Visit Vitals  Pulse 58   Temp 36 °C (96.8 °F)   Resp 12        Lab Results   Component Value Date    CREATININE 1.11 (H) 12/06/2023    CREATININE 1.25 (H) 12/05/2023    CREATININE 1.32 (H) 12/05/2023    CREATININE 1.23 (H) 12/05/2023        Patient weight is No results found for: \"PTWEIGHT\"    No results found for: \"CULTURE\"     I/O last 3 completed shifts:  In: 171.2 [I.V.:121.2; IV Piggyback:50]  Out: 350 [Urine:350]  [unfilled]    Lab Results   Component Value Date    PATIENTTEMP 37.0 12/05/2023    PATIENTTEMP 37.0 12/04/2023    PATIENTTEMP 37.0 12/04/2023        Assessment/Plan    Renal function has improved, as Scr is close to baseline of ~ 1.0. Will start scheduled vancomycin at 750mg q24h.     This dosing regimen is predicted by [employer] to result in the following pharmacokinetic parameters:  Loading dose: N/A  Regimen: 750 mg IV every 24 hours.  Start time: 19:12 on 12/06/2023  Exposure target: AUC24 (range)400-600 mg/L.hr   AUC24,ss: 447 mg/L.hr  Probability of AUC24 > 400: 72 %  Ctrough,ss: 13.9 mg/L  Probability of Ctrough,ss > 20: 8 %  Probability of nephrotoxicity (Lodise SHARDA 2009): 9 %    The next level will be obtained on 12/8 at AM labs. May be obtained sooner if clinically indicated.   Will continue to monitor renal function daily while on vancomycin and order serum creatinine at least every 48 hours if not already ordered.  Follow for continued vancomycin needs, clinical response, and signs/symptoms of toxicity.       Wolf Cook, PharmD           "

## 2023-12-07 NOTE — PROGRESS NOTES
"Nay Frost is a 83 y.o. female on day 1 of admission presenting with Sepsis (CMS/HCC).    Subjective   Patient maintained off of dopamine drip overnight, HR trending between 25-50. More somnolent this morning, though appears to sleeping peacefully and in no distress. Breathing comfortably on RA.    Discussion held with patient's daughter and son this morning, with decision made to go comfort care. Code status updated.       Objective     Physical Exam  Constitutional:       Comments: Sleeping comfortably, in no acute distress   HENT:      Head: Normocephalic and atraumatic.      Nose: Nose normal.      Mouth/Throat:      Mouth: Mucous membranes are dry.      Pharynx: No oropharyngeal exudate or posterior oropharyngeal erythema.   Cardiovascular:      Rate and Rhythm: Regular rhythm. Bradycardia present.   Pulmonary:      Effort: Pulmonary effort is normal. No respiratory distress.   Abdominal:      General: Abdomen is flat.      Palpations: Abdomen is soft.   Musculoskeletal:         General: Normal range of motion.      Cervical back: Normal range of motion and neck supple.      Right lower leg: No edema.      Left lower leg: No edema.   Skin:     General: Skin is warm and dry.   Neurological:      Comments: More somnolent compared to prior exam, difficult to arouse   Psychiatric:      Comments: Unable to assess       Last Recorded Vitals  Blood pressure (!) 108/47, pulse (!) 32, temperature 35.9 °C (96.6 °F), temperature source Temporal, resp. rate 12, height 1.6 m (5' 2.99\"), weight 52.5 kg (115 lb 11.9 oz), SpO2 100 %.  Intake/Output last 3 Shifts:  I/O last 3 completed shifts:  In: 1371.2 (26.1 mL/kg) [I.V.:771.2 (14.7 mL/kg); IV Piggyback:600]  Out: 730 (13.9 mL/kg) [Urine:730 (0.4 mL/kg/hr)]  Weight: 52.5 kg             PRN medications  PRN medications: glycopyrrolate, HYDROmorphone, hyoscyamine, ketorolac, LORazepam, LORazepam       This patient has a central line   Reason for the central line " remaining today? Parenteral medication      Malnutrition          I agree with the dietitian's malnutrition diagnosis.    Assessment/Plan   Principal Problem:    Sepsis (CMS/[facility])    Nay Frost is a 83 y.o. female with Alzheimer's disease (minimally conversant at baseline), Crohn's disease, and seizures who presented as a transfer from [facility] on 12/1 with septic shock 2/2 suspected HAP and E coli UTI. ICU course complicated by hypernatremia and new-onset sinus bradycardia with 1st degree AV block requiring dopamine drip. Treated with broad spectrum antibiotics and hypotonic fluids.    Patient made comfort care this morning after discussion with family.     #Comfort care  -Code status updated to DNR comfort measures only  -PRN glycopyrrolate, hydromorphone, hyoscyamine, ketorolac (fever), lorazepam  -Comfort feeds  -Maintaining central line for PRNs meds in absence of pIVs    Access: R fem CVC, Mathew catheter      Margaux Dailey MD

## 2023-12-07 NOTE — CARE PLAN
The patient's goals for the shift include      The clinical goals for the shift include Pt will not remove any safety equipment    Over the shift, the patient did not make progress toward the following goals. Barriers to progression include Pt confused. Recommendations to address these barriers include reorient pt and remind to shift weight q 2 hours.

## 2023-12-07 NOTE — CODE DOCUMENTATION
Discussion held at bedside with daughter and son this morning after patient's HR dropped into the 20s and BP into the 60s/30s.     Explained that ongoing interventions such as antibiotics and fluids would likely not be in line with the patient's goal of comfort. Family is understanding and agrees, states that they had expected her to possibly pass overnight after stopping the dopamine drip but understand that further measures would likely be of no benefit and only cause discomfort.     Patient is now DNR comfort care. Confirmed with daughter Zeenat (medical POA)

## 2023-12-08 LAB
ATRIAL RATE: 59 BPM
BACTERIA BLD CULT: NORMAL
BACTERIA BLD CULT: NORMAL
P OFFSET: 188 MS
P ONSET: 116 MS
PR INTERVAL: 224 MS
Q ONSET: 228 MS
QRS COUNT: 9 BEATS
QRS DURATION: 106 MS
QT INTERVAL: 484 MS
QTC CALCULATION(BAZETT): 467 MS
QTC FREDERICIA: 473 MS
R AXIS: -49 DEGREES
T AXIS: 66 DEGREES
T OFFSET: 470 MS
VENTRICULAR RATE: 56 BPM

## 2023-12-08 PROCEDURE — 1100000001 HC PRIVATE ROOM DAILY

## 2023-12-08 PROCEDURE — 99233 SBSQ HOSP IP/OBS HIGH 50: CPT

## 2023-12-08 PROCEDURE — 2500000001 HC RX 250 WO HCPCS SELF ADMINISTERED DRUGS (ALT 637 FOR MEDICARE OP)

## 2023-12-08 PROCEDURE — 2500000004 HC RX 250 GENERAL PHARMACY W/ HCPCS (ALT 636 FOR OP/ED)

## 2023-12-08 RX ORDER — PETROLATUM 420 MG/G
OINTMENT TOPICAL
Status: DISPENSED
Start: 2023-12-08 | End: 2023-12-08

## 2023-12-08 RX ORDER — TRAZODONE HYDROCHLORIDE 50 MG/1
25 TABLET ORAL ONCE
Status: COMPLETED | OUTPATIENT
Start: 2023-12-08 | End: 2023-12-08

## 2023-12-08 RX ADMIN — TRAZODONE HYDROCHLORIDE 25 MG: 50 TABLET ORAL at 16:28

## 2023-12-08 RX ADMIN — HYDROMORPHONE HYDROCHLORIDE 0.2 MG: 1 INJECTION, SOLUTION INTRAMUSCULAR; INTRAVENOUS; SUBCUTANEOUS at 13:54

## 2023-12-08 RX ADMIN — HYDROMORPHONE HYDROCHLORIDE 0.2 MG: 1 INJECTION, SOLUTION INTRAMUSCULAR; INTRAVENOUS; SUBCUTANEOUS at 09:28

## 2023-12-08 RX ADMIN — HYDROMORPHONE HYDROCHLORIDE 0.2 MG: 1 INJECTION, SOLUTION INTRAMUSCULAR; INTRAVENOUS; SUBCUTANEOUS at 03:05

## 2023-12-08 RX ADMIN — LORAZEPAM 0.5 MG: 2 INJECTION INTRAMUSCULAR; INTRAVENOUS at 17:33

## 2023-12-08 RX ADMIN — HYDROMORPHONE HYDROCHLORIDE 0.2 MG: 1 INJECTION, SOLUTION INTRAMUSCULAR; INTRAVENOUS; SUBCUTANEOUS at 17:52

## 2023-12-08 ASSESSMENT — COGNITIVE AND FUNCTIONAL STATUS - GENERAL
DAILY ACTIVITIY SCORE: 7
PERSONAL GROOMING: TOTAL
MOBILITY SCORE: 8
DRESSING REGULAR UPPER BODY CLOTHING: TOTAL
MOVING FROM LYING ON BACK TO SITTING ON SIDE OF FLAT BED WITH BEDRAILS: A LOT
HELP NEEDED FOR BATHING: TOTAL
DRESSING REGULAR LOWER BODY CLOTHING: TOTAL
EATING MEALS: A LOT
TOILETING: TOTAL
STANDING UP FROM CHAIR USING ARMS: TOTAL
TURNING FROM BACK TO SIDE WHILE IN FLAT BAD: A LOT
MOVING TO AND FROM BED TO CHAIR: TOTAL
CLIMB 3 TO 5 STEPS WITH RAILING: TOTAL
WALKING IN HOSPITAL ROOM: TOTAL

## 2023-12-08 ASSESSMENT — PAIN SCALES - GENERAL
PAINLEVEL_OUTOF10: 0 - NO PAIN
PAINLEVEL_OUTOF10: 0 - NO PAIN

## 2023-12-08 ASSESSMENT — PAIN - FUNCTIONAL ASSESSMENT: PAIN_FUNCTIONAL_ASSESSMENT: CPOT (CRITICAL CARE PAIN OBSERVATION TOOL)

## 2023-12-08 NOTE — SIGNIFICANT EVENT
ICU to [facility] Transfer Summary     I:  ICU Admission Reason & Brief ICU Course:    Nay Falcon is an 82 yo F with a PMHx of Alzheimer's disease (minimally verbal at baseline), Crohn's disease, HTN, HLD, insomnia, and prior seizures who initially presented 12/3 from home nursing care facility due to concern for left thumb infection. Found to be acutely hypernatremic (169) and in septic shock with evidence of right-sided pneumonia and UTI.     Underwent debridement of left thumb with ortho in the ED, with overall low concern for skin/soft tissue infection as source of sepsis.     Admitted to the MICU, started on Levophed and broad-spectrum antibiotics. Received volume resuscitation with isotonic solutions and was started on D5W-1/2 saline infusion for hypernatremia. ICU course c/b new sinus bradycardia with 1st degree AV block requiring dopamine drip.  Her pressure requirements slowly came down, and her mental status improved.  On 12/6, family engaged in goals of care conversation which ended in making the patient comfort care.  Family did discuss with hospice of the [facility] and decided against their services.       C: Code Status/DPOA Info/Goals of Care/ACP Note    DNR Comfort Measures Only  DPOA/Contact Number: Ramandeep Epperson [phone] (cell)     U: Unprescribing & Pertinent High-Risk Medications    Changes to home meds: holding all home meds     Anticoagulation: No Reason for no VTE prophylaxis:procedure not indicated    Antibiotics:   [x] N/A - no current planned antimicrobioals      P: Pending Tests at the Time of Transfer   none      A: Active consultants, including Rehab:   [x]  Subspecialty Consultants:  pastoral care, social work  []  PT  []  OT  []  SLP  []  Wound Care    U: Uncertainty Measure/Diagnostic Pause:    Working diagnosis at the time of transfer sepis     Diagnosis Degree of Certainty: 1. High degree of certainty about the clinical diagnosis.     S: Summary of Major Problems and  To-Dos:   #Sepsis  #Comfort care  -comfort care order set in place       E: Exam, including Lines/Drains/Airways & Data Review:   Gen: NAD, resting comfortably  Head and neck: NCAT  HEENT: MMM, normal nose without congestion  Pulm: normal WOB on RA  Ext: WWP, no LE edema  Neuro: normal tone, face symmetric, moves all extremities spontaneously    Difficult airway? N/A  Lines/drains assessed for removal? Yes, describe: L fem CVC in place because PIV access has been difficult    Within 30 minutes of the patient physically leaving the floor, a Floor Readiness Note needs to be placed with updated vitals.

## 2023-12-08 NOTE — SIGNIFICANT EVENT
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 Found to be acutely hypernatremic (169) and in septic shock with evidence of right-sided pneumonia and UTI.     Underwent debridement of left thumb with ortho in the ED, with overall low concern for skin/soft tissue infection as source of sepsis.     Admitted to the MICU, started on Levophed and broad-spectrum antibiotics. Received volume resuscitation with isotonic solutions and was started on D5W-1/2 saline infusion for hypernatremia. ICU course c/b new sinus bradycardia with 1st degree AV block requiring dopamine drip.  Her pressure requirements slowly came down, and her mental status improved.  On 12/6, family engaged in goals of care conversation which ended in making the patient comfort care.  Family did discuss with hospice of the [facility] and decided against their services.       C: Code Status/DPOA Info/Goals of Care/ACP Note    DNR Comfort Measures Only  DPOA/Contact Number: Ramandeep Epperson [phone] (cell)     U: Unprescribing & Pertinent High-Risk Medications    Changes to home meds: holding all home meds     Anticoagulation: No Reason for no VTE prophylaxis:procedure not indicated    Antibiotics:   [x] N/A - no current planned antimicrobioals      P: Pending Tests at the Time of Transfer   none      A: Active consultants, including Rehab:   [x]  Subspecialty Consultants:  pastoral care, social work  []  PT  []  OT  []  SLP  []  Wound Care    U: Uncertainty Measure/Diagnostic Pause:    Working diagnosis at the time of transfer sepis     Diagnosis Degree of Certainty: 1. High degree of certainty about the clinical diagnosis.     S: Summary of Major Problems and  To-Dos:   #Sepsis  #Comfort care  -comfort care order set in place       E: Exam, including Lines/Drains/Airways & Data Review:   Gen: NAD, resting comfortably  Head and neck: NCAT  HEENT: MMM, normal nose without congestion  CV: bradycardia, no murmurs  Pulm: chest rise observed, CTAB  Ext: WWP, no LE edema    Difficult airway? N/A  Lines/drains assessed for removal? Yes, describe: L fem CVC in place because PIV access has been difficult    Within 30 minutes of the patient physically leaving the floor, a Floor Readiness Note needs to be placed with updated vitals.      Javi Roberts, DO  Internal Medicine, PGY-I

## 2023-12-08 NOTE — PROGRESS NOTES
Asked to meet with pt's family today, as pt had recently been admitted to the hospital under hospice GIP with [facility], but then pt made a recovery, and no longer qualified for GIP level of care.  Pt is pleasant and confused in the room but happy to have visitors.  Daughter Zeenat who lives in [facility] is HCPOA.  Son Danny and friend Whit also present in room.  Daughter is concerned about pt's aspiration risk, and wanting to make sure that pt has adequate nutrition.  Daughter  Concerned about pt pulling at her fem line, but wants team to keep it in in case she needs fluids, since she is only eating small bites and small sips.  Daughter states that primary team mentioned that they may do a MBSS.  Awaiting to talk with team.  Ultimately, daughter plans for pt to return to [facility] where they know her and can manage her dementia behaviors.  She spoke to staff there and they stated they use [facility], and daughter is agreeable to referral being sent there.  Once order placed, will send referrals.  Viviane Tony, SYDNIE

## 2023-12-08 NOTE — SIGNIFICANT EVENT
Floor Readiness Note       I, personally, evaluated Nay Frost prior to transfer to the floor, including reviewing all current laboratory and imaging studies. The patient remains appropriate for transfer to the floor. Bedside nurse and respiratory therapy are also in agreement of patient's readiness for the floor.     Brief summary:  Nay Frost is a 83 y.o. female who was admitted to the MICU on 12/4 for shock. They have been treated with antibiotics for UTI. She also hadbradycardia. Decision was made to transition to comfort care on 12/7.    Updated focused Physical Exam:  Full physical exam deferred given that patient's goals are comfort focused.  Awake, alert, moving all extremities.    Current Vital Signs:  Heart Rate: 50 (12/08/23 0900 : Cristine Peterson RN)  BP: (!) 108/47 (12/06/23 2000 : Gera Burnett RN)  Temp: 35.9 °C (96.6 °F) (12/07/23 0400 : Bethanie Avilez RN)  Resp: 13 (12/08/23 0900 : Cristine Peterson RN)  SpO2: 96 % (12/08/23 0900 : Cristine Peterson RN)    Relevant updates since rounds:  Pending transfer to floor for continued comfort care measures and disposition planning. Initially plan was to transition to hospice with transfer to [facility]; however, then decision made only to transition care to comfort care measures without hospice. Would benefit from continued discussion with pt's family to figure out dispo planning as it is unclear how long she will survive and it may be longer than days.    Accepting team, Joan, received sign out and the Provider Care team/Attending has been updated. Bedside nurse will now call accepting nurse for report and patient will be transferred to [place] [phone].    Constance Mukherjee MD

## 2023-12-08 NOTE — PROGRESS NOTES
SOCIAL WORK NOTE   Per notes, patient converted back from GIP. Per [facility], referral is on hold. Family reports that they are not ready for hospice at this time. Social work to follow.  ABELARDO Randle, LISW-S ([id number])

## 2023-12-08 NOTE — PROGRESS NOTES
"Nay Frost is a 83 y.o. female on day 2 of admission presenting with Sepsis (CMS/HCC).    Subjective   Patient is energetic and interactive. Family at bedside.       Objective     Physical Exam  Constitutional:       General: She is not in acute distress.     Appearance: She is not ill-appearing.      Comments: Thin   HENT:      Mouth/Throat:      Mouth: Mucous membranes are dry.      Pharynx: Oropharynx is clear.   Eyes:      General: No scleral icterus.  Cardiovascular:      Rate and Rhythm: Normal rate.      Pulses: Normal pulses.      Heart sounds: Normal heart sounds.   Pulmonary:      Effort: Pulmonary effort is normal. No respiratory distress.      Breath sounds: Normal breath sounds.   Abdominal:      General: There is no distension.      Tenderness: There is no abdominal tenderness. There is no guarding.   Musculoskeletal:      Cervical back: No tenderness.      Right lower leg: No edema.      Left lower leg: No edema.   Lymphadenopathy:      Cervical: No cervical adenopathy.   Skin:     General: Skin is warm and dry.   Neurological:      Mental Status: She is alert. Mental status is at baseline.      Comments: Baseline alert, poor insight/judgement   Psychiatric:      Comments: Intermittently engages, easily distracted       Last Recorded Vitals  Blood pressure (!) 108/47, pulse 50, temperature 35.9 °C (96.6 °F), temperature source Temporal, resp. rate 13, height 1.6 m (5' 2.99\"), weight 52.5 kg (115 lb 11.9 oz), SpO2 96 %.  Intake/Output last 3 Shifts:  I/O last 3 completed shifts:  In: 1292.5 (24.6 mL/kg) [I.V.:742.5 (14.1 mL/kg); IV Piggyback:550]  Out: 730 (13.9 mL/kg) [Urine:730 (0.4 mL/kg/hr)]  Weight: 52.5 kg     Relevant Results                        Malnutrition       I agree with the dietitian's malnutrition diagnosis.      Assessment/Plan   Principal Problem:    Sepsis (CMS/HCC)    82 yo F with a PMHx of advanced Alzheimer's disease (minimally verbal at baseline), Crohn's disease, " HTN, HLD, insomnia, and prior seizures who initially presented 12/3 from [facility] d/t left thumb infection. She was admitted to MICU on 12/4 for septic shock with evidence of right-sided PNA and UTI. Treated with antibiotics.  ICU course c/b new sinus bradycardia with 1st degree AV block requiring dopamine drip. On 12/7, patient became comfort care measures only.    #Comfort care  Patient/Family met with social work who will follow up tomorrow on status of referrals and assist with coordinating discharge. Ultimately, daughter plans for pt to return to [facility] where they know her and can manage her dementia behaviors. She spoke to staff there and they stated they use [facility], and daughter is agreeable to referral being sent there.  -Code status DNR comfort measures only  -PRN glycopyrrolate, hydromorphone, hyoscyamine, ketorolac (fever), lorazepam  -Comfort feeds  -Maintaining central line for PRNs meds in absence of pIVs             Riccardo Hernandez MD

## 2023-12-08 NOTE — PROGRESS NOTES
Nay Frost is a 83 y.o. female on day 2 of admission presenting with Sepsis (CMS/HCC).    Subjective   Referral was sent to [facility] for ICF and to [facility] for hospice services.     Will follow up tomorrow on status of referrals and assist with coordinating discharge.    -Maddi ROJAS MA, LSW  [phone] or [facility]  Care Transitions

## 2023-12-09 VITALS
BODY MASS INDEX: 20.51 KG/M2 | DIASTOLIC BLOOD PRESSURE: 91 MMHG | HEART RATE: 59 BPM | HEIGHT: 63 IN | SYSTOLIC BLOOD PRESSURE: 111 MMHG | TEMPERATURE: 96.8 F | RESPIRATION RATE: 16 BRPM | OXYGEN SATURATION: 100 % | WEIGHT: 115.74 LBS

## 2023-12-09 PROCEDURE — 2500000004 HC RX 250 GENERAL PHARMACY W/ HCPCS (ALT 636 FOR OP/ED)

## 2023-12-09 PROCEDURE — 99232 SBSQ HOSP IP/OBS MODERATE 35: CPT

## 2023-12-09 RX ORDER — OXYCODONE HYDROCHLORIDE 5 MG/1
5 TABLET ORAL EVERY 6 HOURS PRN
Status: CANCELLED | OUTPATIENT
Start: 2023-12-09

## 2023-12-09 RX ORDER — LORAZEPAM 0.5 MG/1
.5-1 TABLET ORAL EVERY 8 HOURS PRN
Qty: 10 TABLET | Refills: 1
Start: 2023-12-09

## 2023-12-09 RX ORDER — OXYCODONE HYDROCHLORIDE 5 MG/1
5 TABLET ORAL EVERY 6 HOURS PRN
Qty: 15 TABLET | Refills: 0
Start: 2023-12-09

## 2023-12-09 RX ORDER — LORAZEPAM 0.5 MG/1
TABLET ORAL
Status: CANCELLED | OUTPATIENT
Start: 2023-12-09

## 2023-12-09 RX ORDER — HYOSCYAMINE SULFATE 0.12 MG/1
0.12 TABLET, ORALLY DISINTEGRATING ORAL EVERY 4 HOURS PRN
Qty: 30 TABLET | Refills: 1
Start: 2023-12-09

## 2023-12-09 RX ORDER — OXYCODONE HYDROCHLORIDE 5 MG/1
10 TABLET ORAL EVERY 4 HOURS PRN
Status: CANCELLED | OUTPATIENT
Start: 2023-12-09

## 2023-12-09 RX ORDER — IBUPROFEN 400 MG/1
400 TABLET ORAL 2 TIMES DAILY PRN
Qty: 30 TABLET | Refills: 1
Start: 2023-12-09

## 2023-12-09 RX ADMIN — HYDROMORPHONE HYDROCHLORIDE 0.2 MG: 1 INJECTION, SOLUTION INTRAMUSCULAR; INTRAVENOUS; SUBCUTANEOUS at 13:39

## 2023-12-09 RX ADMIN — LORAZEPAM 0.5 MG: 2 INJECTION INTRAMUSCULAR; INTRAVENOUS at 15:31

## 2023-12-09 ASSESSMENT — COGNITIVE AND FUNCTIONAL STATUS - GENERAL
EATING MEALS: A LOT
HELP NEEDED FOR BATHING: A LOT
CLIMB 3 TO 5 STEPS WITH RAILING: TOTAL
MOVING TO AND FROM BED TO CHAIR: TOTAL
MOBILITY SCORE: 8
PERSONAL GROOMING: TOTAL
DRESSING REGULAR UPPER BODY CLOTHING: A LITTLE
TURNING FROM BACK TO SIDE WHILE IN FLAT BAD: A LOT
STANDING UP FROM CHAIR USING ARMS: TOTAL
MOVING FROM LYING ON BACK TO SITTING ON SIDE OF FLAT BED WITH BEDRAILS: A LOT
DAILY ACTIVITIY SCORE: 11
DRESSING REGULAR LOWER BODY CLOTHING: A LOT
WALKING IN HOSPITAL ROOM: TOTAL
TOILETING: TOTAL

## 2023-12-09 ASSESSMENT — PAIN SCALES - PAIN ASSESSMENT IN ADVANCED DEMENTIA (PAINAD)
BREATHING: NORMAL
BODYLANGUAGE: RELAXED
FACIALEXPRESSION: SMILING OR INEXPRESSIVE
TOTALSCORE: 0
CONSOLABILITY: NO NEED TO CONSOLE

## 2023-12-09 NOTE — CARE PLAN
The patient's goals for the shift include      The clinical goals for the shift include Pt will not remove any safety equipment    Over the shift, the patient did not make progress toward the following goals. Barriers to progression include . Recommendations to address these barriers include   Problem: Skin  Goal: Decreased wound size/increased tissue granulation at next dressing change  Outcome: Progressing  Goal: Participates in plan/prevention/treatment measures  Outcome: Progressing  Goal: Prevent/manage excess moisture  Outcome: Progressing  Goal: Prevent/minimize sheer/friction injuries  Outcome: Progressing  Goal: Promote/optimize nutrition  Outcome: Progressing  Goal: Promote skin healing  Outcome: Progressing     Problem: Pain - Adult  Goal: Verbalizes/displays adequate comfort level or baseline comfort level  Outcome: Progressing     Problem: Safety - Adult  Goal: Free from fall injury  Outcome: Progressing     Problem: Discharge Planning  Goal: Discharge to home or other facility with appropriate resources  Outcome: Progressing     Problem: Chronic Conditions and Co-morbidities  Goal: Patient's chronic conditions and co-morbidity symptoms are monitored and maintained or improved  Outcome: Progressing   .

## 2023-12-09 NOTE — PROGRESS NOTES
Nay Frost is a 83 y.o. female on day 3 of admission presenting with Sepsis (CMS/HCC).    Subjective   Careport reviewed and noted that patient is a bed hold and is able to return to [facility] when medically ready.    Call placed to [facility] ([phone]) and confirmed that they are able to accept patient at discharge; they have been communicating with the daughter.    Inquired if [facility] is able to accept patient today or tomorrow.     Primary team updated.    UPDATE Received update from [facility] that they are able to accept patient back today; transport confirmed for 4:30pm  via [facility].     Call placed to [facility] to notify them of planned discharge today; they plan to meet the daughter at [facility] around 5pm for consents to be signed.    Primary team and Bedside RN updated. Blue form provided to unit secretary.     -Maddi ROJAS MA, LSW  [phone] or [facility]  Care Transitions

## 2023-12-09 NOTE — PROGRESS NOTES
"Nay Frost is a 83 y.o. female on day 3 of admission presenting with Sepsis (CMS/HCC).    Subjective   Pt doing well, baseline has dementia but very interactive, family at bedside. Deniea any complaints, currently comfort care       Objective     Physical Exam  Constitutional:       General: She is not in acute distress.     Appearance: She is not ill-appearing.      Comments: Thin   HENT:      Mouth/Throat:      Mouth: Mucous membranes are normal      Pharynx: Oropharynx is clear.   Eyes:      General: No scleral icterus.  Cardiovascular:      Rate and Rhythm: Normal rate.      Pulses: Normal pulses.      Heart sounds: Normal heart sounds.   Pulmonary:      Effort: Pulmonary effort is normal. No respiratory distress.      Breath sounds: Normal breath sounds.   Abdominal:      General: There is no distension.      Tenderness: There is no abdominal tenderness. There is no guarding.   Musculoskeletal:      Cervical back: No tenderness.      Right lower leg: No edema.      Left lower leg: No edema.   Lymphadenopathy:      Cervical: No cervical adenopathy.   Skin:     General: Skin is warm and dry.   Neurological:      Mental Status: She is alert. Mental status is at baseline.      Comments: Baseline alert, poor insight/judgement   Psychiatric:      Comments: Intermittently engages, easily distracted         Last Recorded Vitals  Blood pressure (!) 127/102, pulse 62, temperature 36.3 °C (97.3 °F), resp. rate 18, height 1.6 m (5' 2.99\"), weight 52.5 kg (115 lb 11.9 oz), SpO2 96 %.  Intake/Output last 3 Shifts:  I/O last 3 completed shifts:  In: - (0 mL/kg)   Out: 450 (8.6 mL/kg) [Urine:450 (0.2 mL/kg/hr)]  Weight: 52.5 kg     Relevant Results  No current facility-administered medications on file prior to encounter.     Current Outpatient Medications on File Prior to Encounter   Medication Sig Dispense Refill    aspirin 81 mg EC tablet Take 1 tablet (81 mg) by mouth once daily.      atorvastatin (Lipitor) 40 mg " tablet Take 1 tablet (40 mg) by mouth once daily.      bisacodyL 5 mg tablet Take 10 mg by mouth every 8 hours if needed for constipation. Do not crush, chew, or split.      calcium citrate-vitamin D3 250 mg-5 mcg (200 unit) tablet Take 1 tablet by mouth once daily.      cholecalciferol (Vitamin D-3) 5,000 Units tablet Take 1 tablet (5,000 Units) by mouth once daily.      coenzyme Q-10 100 mg capsule Take 1 capsule (100 mg) by mouth once daily.      donepezil (Aricept) 5 mg tablet Take 1 tablet (5 mg) by mouth once daily at bedtime.      magnesium 250 mg tablet Take 1 tablet (250 mg) by mouth once daily.      magnesium hydroxide (Milk of Magnesia) 400 mg/5 mL suspension Take 30 mL by mouth once daily as needed for constipation.      metoprolol tartrate (Lopressor) 50 mg tablet Take 0.5 tablets by mouth once daily.      multivitamin capsule Take 1 capsule by mouth once daily.      sennosides (Senokot) 8.6 mg tablet Take 1 tablet (8.6 mg) by mouth once daily.      tamsulosin (Flomax) 0.4 mg 24 hr capsule Take 1 capsule (0.4 mg) by mouth once daily in the morning. Take before meals. 30 capsule 0    thiamine 100 mg tablet Take 1 tablet (100 mg) by mouth once daily.      traMADol (Ultram) 50 mg tablet Take 1 tablet (50 mg) by mouth every 12 hours if needed for severe pain (7 - 10). 10 tablet 0    traZODone (Desyrel) 50 mg tablet Take 0.5 tablets (25 mg) by mouth once daily at bedtime.      [DISCONTINUED] bisacodyl (Dulcolax) 10 mg suppository USE AS DIRECTED.      [DISCONTINUED] calcium citrate-vitamin D2 250 mg-2.5 mcg (100 unit) tablet Take 1 tablet by mouth once daily.      [DISCONTINUED] cholecalciferol (Vitamin D-3) 25 MCG (1000 UT) capsule TAKE 5 CAPSULES DAILY      [DISCONTINUED] co-enzyme Q-10 50 mg capsule Take 2 capsules (100 mg) by mouth once daily.      [DISCONTINUED] dexAMETHasone (Decadron) 6 mg tablet       [DISCONTINUED] EVENING PRIMROSE OIL ORAL Take 1,000 mg by mouth once daily at bedtime.       [DISCONTINUED] flaxseed oiL 1,000 mg capsule Take 1 capsule (1,000 mg) by mouth once daily.      [DISCONTINUED] KRILL OIL ORAL Take 350 mg by mouth once daily at bedtime.      [DISCONTINUED] levoFLOXacin (Levaquin) 500 mg tablet       [DISCONTINUED] LORazepam (Ativan) 1 mg tablet       [DISCONTINUED] magnesium hydroxide (Milk of Magnesia) 400 mg/5 mL suspension USE AS DIRECTED.      [DISCONTINUED] magnesium oxide (Mag-Ox) 250 mg magnesium tablet Take 1 tablet (250 mg) by mouth once daily.      [DISCONTINUED] metoprolol succinate XL (Toprol-XL) 50 mg 24 hr tablet 1 tab(s) orally once a day (at bedtime)      [DISCONTINUED] omega-3 fatty acids-fish oil 300-1,000 mg capsule 1 cap(s) orally once a day                Assessment/Plan      82 yo F with a PMHx of advanced Alzheimer's disease (minimally verbal at baseline), Crohn's disease, HTN, HLD, insomnia, and prior seizures who initially presented 12/3 from [facility] d/t left thumb infection. She was admitted to MICU on 12/4 for septic shock with evidence of right-sided PNA and UTI. Treated with antibiotics.  ICU course c/b new sinus bradycardia with 1st degree AV block requiring dopamine drip. On 12/7, patient became comfort care measures only.     12/9 Update  -Pt has a bed at [facility] will be transferred to facility today and subsequently Hospice at [facility]  -Daughter ok with transfer  -To take out central line and review or IV meds to orals for comfort care      #Comfort care  Patient/Family met with social work who will follow up tomorrow on status of referrals and assist with coordinating discharge. Ultimately, daughter plans for pt to return to [facility] where they know her and can manage her dementia behaviors. She spoke to staff there and they stated they use [facility], and daughter is agreeable to referral being sent there.  -Code status DNR comfort measures only  -PRN glycopyrrolate, hydromorphone,  hyoscyamine, ketorolac (fever), lorazepam  -Comfort feeds  -Maintaining central line for PRNs meds in absence of pIVs         Arabella Puente MD

## 2023-12-09 NOTE — DISCHARGE INSTRUCTIONS
Vini MS Nay, you were admitted for an infection in your chest (Pneumonia) and urine and managed  Currently on comfort care as per discussion with your daughter.   We are transferring you to [facility] and later will be transferred to [facility] for comfort care measures.    It was great taking care of you.  Your [facility] team

## 2023-12-09 NOTE — PROGRESS NOTES
Speech-Language Pathology               Therapy Communication Note    Patient Name: Nay Frost  MRN: [id number]  Today's Date: 12/9/2023     Discipline: Speech Language Pathology    Missed Visit Reason:  Speech Therapy consult received 12/8 at 13:56.  Epic Chat w/ physician at this time who indicates evaluation no longer warranted.  Plan is for discharge to SNF on hospice later this afternoon.  Comfort feeds have been ordered per physician following conversation w/ family.  Will discharge Speech Therapy order at this time.  Please re-consult should the need arise.      12/09/23 at 5:33 PM - Rosa Hayden, SLP

## 2023-12-09 NOTE — CARE PLAN
The patient's goals for the shift include      The clinical goals for the shift include Patient will be discharged to [facility]    Patient set to return to [facility]. Report called by RN. Central line removed. Mathwe intact. All belongings returned to patient.  Problem: Skin  Goal: Decreased wound size/increased tissue granulation at next dressing change  12/9/2023 1713 by Santa Mancia RN  Outcome: Met  12/9/2023 1200 by Santa Mancia RN  Flowsheets (Taken 12/9/2023 1200)  Decreased wound size/increased tissue granulation at next dressing change: Protective dressings over bony prominences  Goal: Participates in plan/prevention/treatment measures  12/9/2023 1713 by Santa Mancia RN  Outcome: Met  12/9/2023 1200 by Santa Mancia RN  Flowsheets (Taken 12/9/2023 1200)  Participates in plan/prevention/treatment measures: Discuss with provider PT/OT consult  Goal: Prevent/manage excess moisture  12/9/2023 1713 by Santa Mancia RN  Outcome: Met  12/9/2023 1200 by Santa Mancia RN  Flowsheets (Taken 12/9/2023 1200)  Prevent/manage excess moisture: Moisturize dry skin  Goal: Prevent/minimize sheer/friction injuries  12/9/2023 1713 by Santa Mancia RN  Outcome: Met  12/9/2023 1200 by Santa Mancia RN  Flowsheets (Taken 12/9/2023 1200)  Prevent/minimize sheer/friction injuries: Turn/reposition every 2 hours/use positioning/transfer devices  Goal: Promote/optimize nutrition  12/9/2023 1713 by Santa Mancia RN  Outcome: Met  12/9/2023 1200 by Santa Mancia RN  Flowsheets (Taken 12/9/2023 1200)  Promote/optimize nutrition: Monitor/record intake including meals  Goal: Promote skin healing  12/9/2023 1713 by Santa Mancia RN  Outcome: Met  12/9/2023 1200 by Santa Mancia RN  Flowsheets (Taken 12/9/2023 1200)  Promote skin healing: Protective dressings over bony prominences     Problem: Pain - Adult  Goal: Verbalizes/displays adequate comfort level or baseline comfort  level  Outcome: Met     Problem: Safety - Adult  Goal: Free from fall injury  Outcome: Met     Problem: Discharge Planning  Goal: Discharge to home or other facility with appropriate resources  Outcome: Met     Problem: Chronic Conditions and Co-morbidities  Goal: Patient's chronic conditions and co-morbidity symptoms are monitored and maintained or improved  Outcome: Met

## 2023-12-11 LAB
ATRIAL RATE: 74 BPM
P AXIS: 63 DEGREES
P OFFSET: 160 MS
P ONSET: 112 MS
PR INTERVAL: 232 MS
Q ONSET: 228 MS
QRS COUNT: 13 BEATS
QRS DURATION: 100 MS
QT INTERVAL: 434 MS
QTC CALCULATION(BAZETT): 481 MS
QTC FREDERICIA: 465 MS
R AXIS: -69 DEGREES
T AXIS: 196 DEGREES
T OFFSET: 445 MS
VENTRICULAR RATE: 74 BPM

## 2023-12-12 LAB
ATRIAL RATE: 39 BPM
Q ONSET: 226 MS
QRS COUNT: 8 BEATS
QRS DURATION: 102 MS
QT INTERVAL: 502 MS
QTC CALCULATION(BAZETT): 471 MS
QTC FREDERICIA: 481 MS
R AXIS: -46 DEGREES
T AXIS: 171 DEGREES
T OFFSET: 477 MS
VENTRICULAR RATE: 53 BPM

## 2023-12-13 NOTE — DISCHARGE SUMMARY
Discharge Diagnosis  Septic shock (CMS/[facility])    Issues Requiring Follow-Up  None    Test Results Pending At Discharge  Pending Labs       No current pending labs.            Hospital Course  Nay Frost is a 83 y.o. female with Alzheimer's disease (minimally conversant at baseline), Crohn's disease, and seizures who presented as a transfer from [facility] on 12/1 with septic shock 2/2 suspected HAP and E coli UTI. ICU course complicated by hypernatremia and new-onset sinus bradycardia with 1st degree AV block requiring dopamine drip. Treated with broad spectrum antibiotics and hypotonic fluids.     Patient made comfort care 12/6 after discussion with family. Patient was transferred to the floor on 12/7 and discharged to [facility] on 12/9.    Pertinent Physical Exam At Time of Discharge  Physical Exam  Constitutional:       Comments: Sleeping comfortably, in no acute distress   HENT:      Head: Normocephalic and atraumatic.      Nose: Nose normal.      Mouth/Throat:      Mouth: Mucous membranes are dry.      Pharynx: No oropharyngeal exudate or posterior oropharyngeal erythema.   Cardiovascular:      Rate and Rhythm: Regular rhythm. Bradycardia present.   Pulmonary:      Effort: Pulmonary effort is normal. No respiratory distress.   Abdominal:      General: Abdomen is flat.      Palpations: Abdomen is soft.   Musculoskeletal:         General: Normal range of motion.      Cervical back: Normal range of motion and neck supple.      Right lower leg: No edema.      Left lower leg: No edema.   Skin:     General: Skin is warm and dry.   Neurological:      Comments: More somnolent compared to prior exam, difficult to arouse   Psychiatric:      Comments: Unable to assess     Home Medications     Medication List      ASK your doctor about these medications     bisacodyL 5 mg tablet; Ask about: Which instructions should I use?       Outpatient Follow-Up  No future appointments.    Margaux Dailey MD

## 2023-12-14 NOTE — DOCUMENTATION CLARIFICATION NOTE
PATIENT:               ARIA HARDING  ACCT #:                  [id number]  MRN:                       [id number]  :                       1940  ADMIT DATE:       12/3/2023 7:59 PM  DISCH DATE:        2023 3:06 PM  RESPONDING PROVIDER #:        [id number]          PROVIDER RESPONSE TEXT:    Excisional debridement to and including skin    CDI QUERY TEXT:    [facility]_Debridement      Instruction:    Based on your assessment of the patient and the clinical information, please provide the requested documentation by clicking on the appropriate radio button and enter any additional information if prompted.    Question: Please further clarify the debridement procedure as      When answering this query, please exercise your independent professional judgment. The fact that a question is being asked, does not imply that any particular answer is desired or expected.    The patient's clinical indicators include:  Clinical Information: This query refers to the procedure performed on 12/3/23 and documented as Debridement    The patient's left thumb was anesthetized with 1 percent plain lidocaine using a digital block.  Her wound was copiously irrigated with normal saline and then cleansed with chlorhexidine as she is allergic to iodine.  After the thumb was appropriately anesthetized, it was then debrided at bedside down to healthy bleeding tissue.  There remains a layer of dermis protecting the underlying structures.  Options provided:  -- Excisional debridement to and including skin  -- Excisional debridement down to and including subcutaneous tissue and fascia  -- Non-excisional debridement to and including skin  -- Non-excisional debridement down to and including subcutaneous tissue and fascia  -- Other - I will add my own diagnosis  -- Refer to Clinical Documentation Reviewer    Query created by: Antonietta Tijerina on 2023 9:11 AM      Electronically signed by:  MAUREEN HUANG DO 2023 12:52 PM

## 2023-12-21 ENCOUNTER — NURSING HOME VISIT (OUTPATIENT)
Dept: POST ACUTE CARE | Facility: EXTERNAL LOCATION | Age: 83
End: 2023-12-21
Payer: MEDICARE

## 2023-12-21 DIAGNOSIS — R53.1 WEAKNESS GENERALIZED: ICD-10-CM

## 2023-12-21 DIAGNOSIS — G40.909 NONINTRACTABLE EPILEPSY WITHOUT STATUS EPILEPTICUS, UNSPECIFIED EPILEPSY TYPE (MULTI): ICD-10-CM

## 2023-12-21 DIAGNOSIS — G30.9 SEVERE ALZHEIMER'S DEMENTIA WITH MOOD DISTURBANCE, UNSPECIFIED TIMING OF DEMENTIA ONSET (MULTI): ICD-10-CM

## 2023-12-21 DIAGNOSIS — F02.C3 SEVERE ALZHEIMER'S DEMENTIA WITH MOOD DISTURBANCE, UNSPECIFIED TIMING OF DEMENTIA ONSET (MULTI): ICD-10-CM

## 2023-12-21 DIAGNOSIS — R62.7 FAILURE TO THRIVE IN ADULT: ICD-10-CM

## 2023-12-21 DIAGNOSIS — Z51.5 HOSPICE CARE: ICD-10-CM

## 2023-12-21 PROCEDURE — 99308 SBSQ NF CARE LOW MDM 20: CPT | Performed by: INTERNAL MEDICINE

## 2023-12-21 NOTE — LETTER
Patient: Nay Frost  : 1940    Encounter Date: 2023    Name: Nay Frost  : 1940  MRN: [id number]  Visit Date: 2023  Chief Complaint: Monthly visit for management of chronic disease    HPI: 82 y/o CF who presented to [facility] due to acute confusion. Patient has a h/o alzheimer's dementia but her confusion was worse. Imaging was nondiagnostic. She was +ve for a UTI. She was treated with antibiotics. Patient improved and she was brought to [facility] for ongoing medical management and therapy services.    Patient was admitted to [facility] after a stay at [facility] for septic shock 2/2 HCAP and Ecoli UTI. She had hypernatremia and new onset 1st degree AV block and sinus bradycardia requiring a dopamine gtt. She improved with IV atb and hypotonic fluids. Patient was brought back to [facility] on 2023 as DNRCC with [facility] hospice services.     Subjective: Seen and examined today. Laying in bed. Large bruise on left side of face present. Nonsensical speaking. Nursing reports no issues. I have reviewed nursing notes since my last visit and document any significant changes Reviewed orders, medications, Labs. Reviewed and updated Interval hx and meds reviewed. Reviewed chart looking at current medications, treatments, labs, x-rays etc.     ROS:  As above in subjective. Otherwise, all other systems have been reviewed and are negative for complaint.    Medications:  Medications reviewed and verified in NH chart.     Vital Signs: Reviewed in [facility]    Physical Exam:  CONSTITUTIONAL: elderly appearing, malnourished, nonsensical speaking, confused, able to follow simple commands, no distress  SKIN: Warm & Dry, no lesions, no rashes.  EYES: PERRL, EOMI, clear sclera  ENMT: Mucous membranes moist, no apparent injury, no lesions seen   HEAD/NECK: No lymphadenopathy, thyromegaly or JVD.  HEART: Regular rate & rhythm, no murmurs, 2+ equal pulses of the  extremities, Normal S1 & S2.  LUNGS: Patent airways, diminished breath sounds with good chest expansion, thorax symmetric  ABDOMEN: Nondistended, soft, non-tender, +BS, no rebound tenderness or guarding.   EXTREMITIES: Normal extremities, no cyanosis, no edema, no contusions, no clubbing. Bruising present.  NEUROLOGIC: intact reflexes, confused     Results/Data:   Lab Results   Component Value Date    WBC 7.9 12/07/2023    HGB 9.4 (L) 12/07/2023    HCT 30.5 (L) 12/07/2023    PLT 63 (L) 12/07/2023    ALT 26 12/04/2023    AST 28 12/04/2023     (H) 12/07/2023    K 4.0 12/07/2023     (H) 12/07/2023    CREATININE 1.01 12/07/2023    BUN 36 (H) 12/07/2023    CO2 23 12/07/2023    TSH 2.25 12/03/2023    INR 1.4 (H) 12/04/2023    HGBA1C 5.4 12/04/2023     Provider Impression:   Recent Septic Shock 2/2 Ecoli UTI and HCAP  - tx with fluids, IV atb  - returned to baseline  - after discussions with consultants, pt was made a DNRCC and hospice services were introduced.    Failure to Thrive  - keep pt comfortable  - comfort medications in place  - pleasure foods    Seizure like activity felt to be 2/2 UTI  - completed atb during hospitalization  - seizure precautions in place  - saw Dr Deshaun Palma on 7/1/2022 and did not rec medication at this time.   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 Irregular bradycardia noted (44-60bpm).  - nursing reports no seizure activity  - monitor closely, fall precautions    Alzheimer's Vascular Dementia  #mood disorder  - continue with vitamins  - c/w aricept  - consult psych as needed  - supportive care  - secure unit    HTN  - continue with metoprolol  - continue with baby ASA    Insomnia  - continue with trazodone    HLD  - continue with statin    Hypomag  - continue with supplementation    Osteoporosis  - c/w calcium+D     Constipation Prevention  - continue with stool softeners and laxatives PRN    Code  Status  - [facility]  - [facility]    ----------------  Written by Penny Key, RN acting as a scribe for Dr. Bernal. This note accurately reflects the work and decisions made by Dr. Bernal.     I, Dr. Bernal, attest all medical record entries made by the scribe were under my direction and were personally dictated by me. I have reviewed the chart and agree that the record accurately reflects my performance of the history, physical exam, and assessment and plan.       Electronically Signed By: Joesph Mckeon MD   2/14/24  3:08 PM

## 2024-01-03 NOTE — PROGRESS NOTES
Name: Nay Frost  : 1940  MRN: [id number]  Visit Date: 2023  Chief Complaint: Monthly visit for management of chronic disease    HPI: 84 y/o CF who presented to [facility] due to acute confusion. Patient has a h/o alzheimer's dementia but her confusion was worse. Imaging was nondiagnostic. She was +ve for a UTI. She was treated with antibiotics. Patient improved and she was brought to [facility] for ongoing medical management and therapy services.    Subjective: Seen and examined today. Sitting up in chair in common area. Nonsensical speaking. Nursing reports no issues. I have reviewed nursing notes since my last visit and document any significant changes Reviewed orders, medications, Labs. Reviewed and updated Interval hx and meds reviewed. Reviewed chart looking at current medications, treatments, labs, x-rays etc.     ROS:  As above in subjective. Otherwise, all other systems have been reviewed and are negative for complaint.    Medications:  Medications reviewed and verified in NH chart.     Vital Signs: Reviewed in [facility]    Physical Exam:  CONSTITUTIONAL: elderly appearing, malnourished, nonsensical speaking, confused, able to follow simple commands, no distress  SKIN: Warm & Dry, no lesions, no rashes.  EYES: PERRL, EOMI, clear sclera  ENMT: Mucous membranes moist, no apparent injury, no lesions seen   HEAD/NECK: No lymphadenopathy, thyromegaly or JVD.  HEART: Regular rate & rhythm, no murmurs, 2+ equal pulses of the extremities, Normal S1 & S2.  LUNGS: Patent airways, diminished breath sounds with good chest expansion, thorax symmetric  ABDOMEN: Nondistended, soft, non-tender, +BS, no rebound tenderness or guarding.   EXTREMITIES: Normal extremities, no cyanosis, no edema, no contusions, no clubbing  NEUROLOGIC: intact reflexes, confused     Results/Data:   Lab Results   Component Value Date    WBC 7.9 2023    HGB 9.4 (L) 2023    HCT 30.5 (L) 2023    PLT 63 (L)  12/07/2023    ALT 26 12/04/2023    AST 28 12/04/2023     (H) 12/07/2023    K 4.0 12/07/2023     (H) 12/07/2023    CREATININE 1.01 12/07/2023    BUN 36 (H) 12/07/2023    CO2 23 12/07/2023    TSH 2.25 12/03/2023    INR 1.4 (H) 12/04/2023    HGBA1C 5.4 12/04/2023     Provider Impression:   Seizure like activity felt to be 2/2 UTI  - completed atb during hospitalization  - seizure precautions in place  - saw Neuro, Dr Meade on 7/1/2022 and did not rec medication at this time.   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 Irregular bradycardia noted (44-60bpm).  - nursing reports no seizure activity  - monitor closely, fall precautions    Alzheimer's Vascular Dementia  #mood disorder  - continue with vitamins  - c/w aricept  - consult psych as needed  - supportive care  - secure unit    HTN  - continue with metoprolol  - continue with baby ASA    Insomnia  - continue with trazodone    HLD  - continue with statin    Hypomag  - continue with supplementation    Osteoporosis  - c/w calcium+D    Constipation Prevention  - continue with stool softeners and laxatives PRN    Code Status  - DNRCCA    ----------------  Written by Penny Key, RN acting as a scribe for Dr. Bernal. This note accurately reflects the work and decisions made by Dr. Bernal.     I, Dr. Bernal, attest all medical record entries made by the scribe were under my direction and were personally dictated by me. I have reviewed the chart and agree that the record accurately reflects my performance of the history, physical exam, and assessment and plan.

## 2024-02-14 PROBLEM — R62.7 FAILURE TO THRIVE IN ADULT: Status: ACTIVE | Noted: 2024-02-14

## 2024-02-14 PROBLEM — Z51.5 HOSPICE CARE: Status: ACTIVE | Noted: 2024-02-14

## 2024-02-14 NOTE — PROGRESS NOTES
Name: Nay Frost  : 1940  MRN: [id number]  Visit Date: 2023  Chief Complaint: Monthly visit for management of chronic disease    HPI: 84 y/o CF who presented to [facility] due to acute confusion. Patient has a h/o alzheimer's dementia but her confusion was worse. Imaging was nondiagnostic. She was +ve for a UTI. She was treated with antibiotics. Patient improved and she was brought to [facility] for ongoing medical management and therapy services.    Patient was admitted to [facility] after a stay at [facility] for septic shock 2/2 HCAP and Ecoli UTI. She had hypernatremia and new onset 1st degree AV block and sinus bradycardia requiring a dopamine gtt. She improved with IV atb and hypotonic fluids. Patient was brought back to [facility] on 2023 as DNRCC with [facility] hospice services.     Subjective: Seen and examined today. Laying in bed. Large bruise on left side of face present. Nonsensical speaking. Nursing reports no issues. I have reviewed nursing notes since my last visit and document any significant changes Reviewed orders, medications, Labs. Reviewed and updated Interval hx and meds reviewed. Reviewed chart looking at current medications, treatments, labs, x-rays etc.     ROS:  As above in subjective. Otherwise, all other systems have been reviewed and are negative for complaint.    Medications:  Medications reviewed and verified in NH chart.     Vital Signs: Reviewed in [facility]    Physical Exam:  CONSTITUTIONAL: elderly appearing, malnourished, nonsensical speaking, confused, able to follow simple commands, no distress  SKIN: Warm & Dry, no lesions, no rashes.  EYES: PERRL, EOMI, clear sclera  ENMT: Mucous membranes moist, no apparent injury, no lesions seen   HEAD/NECK: No lymphadenopathy, thyromegaly or JVD.  HEART: Regular rate & rhythm, no murmurs, 2+ equal pulses of the extremities, Normal S1 & S2.  LUNGS: Patent airways, diminished breath sounds with good  chest expansion, thorax symmetric  ABDOMEN: Nondistended, soft, non-tender, +BS, no rebound tenderness or guarding.   EXTREMITIES: Normal extremities, no cyanosis, no edema, no contusions, no clubbing. Bruising present.  NEUROLOGIC: intact reflexes, confused     Results/Data:   Lab Results   Component Value Date    WBC 7.9 12/07/2023    HGB 9.4 (L) 12/07/2023    HCT 30.5 (L) 12/07/2023    PLT 63 (L) 12/07/2023    ALT 26 12/04/2023    AST 28 12/04/2023     (H) 12/07/2023    K 4.0 12/07/2023     (H) 12/07/2023    CREATININE 1.01 12/07/2023    BUN 36 (H) 12/07/2023    CO2 23 12/07/2023    TSH 2.25 12/03/2023    INR 1.4 (H) 12/04/2023    HGBA1C 5.4 12/04/2023     Provider Impression:   Recent Septic Shock 2/2 Ecoli UTI and HCAP  - tx with fluids, IV atb  - returned to baseline  - after discussions with consultants, pt was made a DNRCC and hospice services were introduced.    Failure to Thrive  - keep pt comfortable  - comfort medications in place  - pleasure foods    Seizure like activity felt to be 2/2 UTI  - completed atb during hospitalization  - seizure precautions in place  - saw Dr Deshaun Palma on 7/1/2022 and did not rec medication at this time.   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 Irregular bradycardia noted (44-60bpm).  - nursing reports no seizure activity  - monitor closely, fall precautions    Alzheimer's Vascular Dementia  #mood disorder  - continue with vitamins  - c/w aricept  - consult psych as needed  - supportive care  - secure unit    HTN  - continue with metoprolol  - continue with baby ASA    Insomnia  - continue with trazodone    HLD  - continue with statin    Hypomag  - continue with supplementation    Osteoporosis  - c/w calcium+D     Constipation Prevention  - continue with stool softeners and laxatives PRN    [facility]    ----------------  Written by Penny Key RN  acting as a scribe for Dr. Bernal. This note accurately reflects the work and decisions made by Dr. Bernal.     I, Dr. Bernal, attest all medical record entries made by the scribe were under my direction and were personally dictated by me. I have reviewed the chart and agree that the record accurately reflects my performance of the history, physical exam, and assessment and plan.

## (undated) DEVICE — IRRIGATION SYSTEM, WOUND, PULSAVAC PLUS

## (undated) DEVICE — SUTURE, ETHIBOND XTRA, 5 CCS, GRN/BR, LF

## (undated) DEVICE — DRAPE, HIP, TIBURON  HD, W/POUCHES

## (undated) DEVICE — SKIN CLOSURE SYS, PREMIERPRO EXOFIN, 1-4CM X 22CM, 1.75G TUBE

## (undated) DEVICE — BOWL, MIXING, BREAKAWAY FEMORAL NOZZLE, ADVANCED, MEDIUM PRESSURIZER, W/180 GM CEMENT CARTRIDGE

## (undated) DEVICE — TIP, SUCTION, YANKAUER, FLEXIBLE

## (undated) DEVICE — SUTURE, VICRYL, 0, 27 IN, CT-1, UNDYED

## (undated) DEVICE — SUTURE, MONOCRYL, 2-0, 27 IN, SH/V-20 , UNDYED

## (undated) DEVICE — DRAPE, SHEET, U, W/ADHESIVE STRIP, IMPERVIOUS, 60 X 70 IN, DISPOSABLE, LF, STERILE

## (undated) DEVICE — BLADE, OSCILLATOR 32 X 64 X 0.8MM

## (undated) DEVICE — HOOD, SURGICAL, FLYTE HYBRID

## (undated) DEVICE — Device

## (undated) DEVICE — IRRIGATION SYSTEM, WOUND, SURGIPHOR, 450ML, STERILE

## (undated) DEVICE — TIP, INTRAMEDULLARY, PULSAVAC

## (undated) DEVICE — KIT, MINOR, DOUBLE BASIN

## (undated) DEVICE — STAPLER, PROXIMATE SKIN, REGULAR 35, STERILE

## (undated) DEVICE — DRAPE PACK, TOTAL HIP, CUSTOM, GEAUGA

## (undated) DEVICE — DRESSING, MEPILEX BORDER, POST-OP AG, 4 X 10 IN

## (undated) DEVICE — COVER, TABLE, 44X90

## (undated) DEVICE — SUTURE, V-LOC, 4-0, 23IN, P-12, 90 ABS

## (undated) DEVICE — RESERVOIR, WOUND, W/TROCAR, PVC, MEDIUM, 400CC, DAVOL, 1/8 IN, 10FR

## (undated) RX ORDER — BRINZOLAMIDE/BRIMONIDINE TARTRATE 10; 2 MG/ML; MG/ML: 1 SUSPENSION/ DROPS OPHTHALMIC TWICE A DAY